# Patient Record
Sex: MALE | Race: BLACK OR AFRICAN AMERICAN | Employment: OTHER | URBAN - METROPOLITAN AREA
[De-identification: names, ages, dates, MRNs, and addresses within clinical notes are randomized per-mention and may not be internally consistent; named-entity substitution may affect disease eponyms.]

---

## 2017-03-14 ENCOUNTER — HOSPITAL ENCOUNTER (OUTPATIENT)
Dept: PREADMISSION TESTING | Age: 62
Discharge: HOME | DRG: 602 | End: 2017-03-14
Payer: MEDICARE

## 2017-03-14 ENCOUNTER — ANESTHESIA EVENT (OUTPATIENT)
Dept: SURGERY | Age: 62
DRG: 602 | End: 2017-03-14
Payer: MEDICARE

## 2017-03-14 VITALS
BODY MASS INDEX: 27.29 KG/M2 | DIASTOLIC BLOOD PRESSURE: 69 MMHG | SYSTOLIC BLOOD PRESSURE: 156 MMHG | TEMPERATURE: 99.1 F | OXYGEN SATURATION: 95 % | RESPIRATION RATE: 16 BRPM | WEIGHT: 205.9 LBS | HEIGHT: 73 IN | HEART RATE: 90 BPM

## 2017-03-14 LAB
ANION GAP BLD CALC-SCNC: 15 MMOL/L (ref 5–15)
ATRIAL RATE: 83 BPM
BASOPHILS # BLD AUTO: 0 K/UL (ref 0–0.1)
BASOPHILS # BLD: 0 % (ref 0–1)
BUN SERPL-MCNC: 58 MG/DL (ref 6–20)
BUN/CREAT SERPL: 7 (ref 12–20)
CALCIUM SERPL-MCNC: 8.2 MG/DL (ref 8.5–10.1)
CALCULATED P AXIS, ECG09: 64 DEGREES
CALCULATED R AXIS, ECG10: 17 DEGREES
CALCULATED T AXIS, ECG11: 56 DEGREES
CHLORIDE SERPL-SCNC: 97 MMOL/L (ref 97–108)
CO2 SERPL-SCNC: 25 MMOL/L (ref 21–32)
CREAT SERPL-MCNC: 8.09 MG/DL (ref 0.7–1.3)
DIAGNOSIS, 93000: NORMAL
EOSINOPHIL # BLD: 0.1 K/UL (ref 0–0.4)
EOSINOPHIL NFR BLD: 1 % (ref 0–7)
ERYTHROCYTE [DISTWIDTH] IN BLOOD BY AUTOMATED COUNT: 14.8 % (ref 11.5–14.5)
GLUCOSE SERPL-MCNC: 400 MG/DL (ref 65–100)
HCT VFR BLD AUTO: 36.4 % (ref 36.6–50.3)
HGB BLD-MCNC: 11.6 G/DL (ref 12.1–17)
LYMPHOCYTES # BLD AUTO: 12 % (ref 12–49)
LYMPHOCYTES # BLD: 1 K/UL (ref 0.8–3.5)
MCH RBC QN AUTO: 32.6 PG (ref 26–34)
MCHC RBC AUTO-ENTMCNC: 31.9 G/DL (ref 30–36.5)
MCV RBC AUTO: 102.2 FL (ref 80–99)
MONOCYTES # BLD: 0.4 K/UL (ref 0–1)
MONOCYTES NFR BLD AUTO: 5 % (ref 5–13)
NEUTS SEG # BLD: 6.8 K/UL (ref 1.8–8)
NEUTS SEG NFR BLD AUTO: 82 % (ref 32–75)
P-R INTERVAL, ECG05: 166 MS
PLATELET # BLD AUTO: 254 K/UL (ref 150–400)
POTASSIUM SERPL-SCNC: 4.7 MMOL/L (ref 3.5–5.1)
Q-T INTERVAL, ECG07: 388 MS
QRS DURATION, ECG06: 104 MS
QTC CALCULATION (BEZET), ECG08: 455 MS
RBC # BLD AUTO: 3.56 M/UL (ref 4.1–5.7)
SODIUM SERPL-SCNC: 137 MMOL/L (ref 136–145)
VENTRICULAR RATE, ECG03: 83 BPM
WBC # BLD AUTO: 8.3 K/UL (ref 4.1–11.1)

## 2017-03-14 RX ORDER — CIPROFLOXACIN 500 MG/1
TABLET ORAL 2 TIMES DAILY
COMMUNITY
End: 2017-03-20

## 2017-03-14 NOTE — PERIOP NOTES
Informed Carlos Bragg from Dr. Suri Torre office that per anesthesia, patient needs to have HD prior to surgery and that his office would have to arrange that. MEÑO attempted to contact patient's HD center but it was closed today. Carlos Bragg will speak with Dr. Gildardo Hilton about this.   DOS: 3/15/2017

## 2017-03-14 NOTE — H&P
PAT Pre-Op History & Physical    Patient: Caitlin Santana                  MRN: [id number]          SSN: [id number]  YOB: 1955          Age: 59 y.o. Sex: male                Subjective:   Patient is a 59 y.o.  male who presents with history of a non healing ulcer left lateral foot. History of left last toe amputation 6/2016. The patient was evaluated in the surgeon's office and it was determined that the most appropriate plan of care is to proceed with surgical intervention. Patient's PCP Zo Kesy MD    Cardiology Dr. [place], last visit 2/2/17, note obtained and placed on chart. Medication list for antihypertensives differs from patient list provided. Patient stated medications changed. Unable to obtain medication list from nephrology at this time. PCP not did not provide medication list.     Patient is a poor historian at times. States he does dialysis M/W/F at 11am and that the facility is closed on Tuesdays and Thursdays. Patient was not sure of name of nephrologist. Dialysis center is [facility] in [place]. Not open today and no information was obtained. Surgeon's office notified to arrange dialysis prior to surgery, per Anesthesia. Exercise tolerance- States he does not climb stairs due to left sided residual weakness from prior stroke. However, denies CP or SOB with activities. Ambulates short distances with cane. States he lives alone and completes all ADL's independently. States he grocery shops and walks store with cart with out difficulty.     Patient Active Problem List    Diagnosis Date Noted    Osteomyelitis, chronic, ankle or foot ([facility] [address] 06/08/2016    HTN (hypertension) 06/08/2016    DM type 2 causing renal disease ([facility] [address] 06/08/2016    ESRD (end stage renal disease) ([facility] [address] 06/08/2016    CAD (coronary artery disease) 06/08/2016    History of CVA (cerebrovascular accident) 06/08/2016    Hyperkalemia 06/08/2016     Past Medical History:   Diagnosis Date    CAD (coronary artery disease)      2014, MI, CABG    Chronic kidney disease     on dialysis MWF    Diabetes [facility] [facility]     ESRD (end stage renal disease) ([facility] [address] [facility]     Glaucoma     Heart failure ([facility] [address]     chronic diastolic HF per cardiology note    Hyperlipidemia     Hypertension     Ill-defined condition     overweight BMI 27.2    PAD (peripheral artery disease) ([facility] [address]     Stroke [facility]) August 2011    residual left side weakness stroke x 2 per cardiology note      Past Surgical History:   Procedure Laterality Date    CARDIAC SURG PROCEDURE UNLIST      cavbg 2014    HX CORONARY ARTERY BYPASS GRAFT  February 2014    HX HEART CATHETERIZATION  01/24/2014    HX OTHER SURGICAL Left 06/2016    last toe on left foot amputated, for nonhealing toe ulcer    HX VASCULAR ACCESS Right Brenden Catheter    removed when fistula healed    VASCULAR SURGERY PROCEDURE UNLIST      fistula left arm      Prior to Admission medications    Medication Sig Start Date End Date Taking? Authorizing Provider   ciprofloxacin HCl (CIPRO) 500 mg tablet Take  by mouth two (2) times a day. Yes Historical Provider   insulin glargine (LANTUS) 100 unit/mL injection 10 Units by SubCUTAneous route daily. Patient taking differently: 20 Units by SubCUTAneous route daily. 6/11/16  Yes Crayton Kussmaul, MD   calcium acetate (PHOSLO) 667 mg cap Take 6 Caps by mouth three (3) times daily (with meals). Yes Historical Provider   FOLIC ACID/VIT BCOMP,C (DIALYVITE PO) Take 1 Tab by mouth daily. Yes Historical Provider   traZODone (DESYREL) 50 mg tablet Take 150 mg by mouth nightly. Yes Historical Provider   latanoprost (XALATAN) 0.005 % ophthalmic solution Administer 1 Drop to both eyes nightly. Yes Historical Provider   levobunolol (BETAGAN) 0.5 % ophthalmic solution Administer 1 Drop to both eyes daily.    Yes Historical Provider   docusate sodium (COLACE) 100 mg capsule Take 100 mg by mouth two (2) times daily as needed. Yes Historical Provider   isosorbide mononitrate ER (IMDUR) 60 mg CR tablet Take 60 mg by mouth daily. Hold during dialysis sessions   Yes Historical Provider   tamsulosin (FLOMAX) 0.4 mg capsule Take 0.4 mg by mouth nightly. Yes Historical Provider   simvastatin (ZOCOR) 40 mg tablet Take 40 mg by mouth nightly. Yes Historical Provider   aspirin delayed-release 81 mg tablet Take 81 mg by mouth daily. Yes Historical Provider     Current Outpatient Prescriptions   Medication Sig    ciprofloxacin HCl (CIPRO) 500 mg tablet Take  by mouth two (2) times a day.  insulin glargine (LANTUS) 100 unit/mL injection 10 Units by SubCUTAneous route daily. (Patient taking differently: 20 Units by SubCUTAneous route daily.)    calcium acetate (PHOSLO) 667 mg cap Take 6 Caps by mouth three (3) times daily (with meals).  FOLIC ACID/VIT BCOMP,C (DIALYVITE PO) Take 1 Tab by mouth daily.  traZODone (DESYREL) 50 mg tablet Take 150 mg by mouth nightly.  latanoprost (XALATAN) 0.005 % ophthalmic solution Administer 1 Drop to both eyes nightly.  levobunolol (BETAGAN) 0.5 % ophthalmic solution Administer 1 Drop to both eyes daily.  docusate sodium (COLACE) 100 mg capsule Take 100 mg by mouth two (2) times daily as needed.  isosorbide mononitrate ER (IMDUR) 60 mg CR tablet Take 60 mg by mouth daily. Hold during dialysis sessions    tamsulosin (FLOMAX) 0.4 mg capsule Take 0.4 mg by mouth nightly.  simvastatin (ZOCOR) 40 mg tablet Take 40 mg by mouth nightly.  aspirin delayed-release 81 mg tablet Take 81 mg by mouth daily. No current facility-administered medications for this encounter.        Allergies   Allergen Reactions    Zoster Vaccine Live Hives      Social History   Substance Use Topics    Smoking status: Never Smoker    Smokeless tobacco: Never Used    Alcohol use No      History   Drug Use No     Family History   Problem Relation Age of Onset    Cancer Mother      \"bone\"    Stroke Father      aneurysm    No Known Problems Sister     No Known Problems Brother         Review of Systems    Patient denies visual disturbances. Multiple missing teeth. Denies mouth sores and loose teeth. Denies  chills and sweats. Denies cough, shortness of breath, and wheezes. Denies chest pain, tightness, pain radiating down left arm, palpitations, dizziness, and lightheadedness. Denies diarrhea, constipation, and abdominal pain. States he makes a small amount of urine, denies any urinary symptoms. Complaint of left sided weakness, denies change in level of weakness. Complaint of ulcer bottom of left lateral foot. Denies excessive thirst, fatigue and malaise. Objective:     Patient Vitals for the past 24 hrs:   Temp Pulse Resp BP SpO2   17 1424 99.1 °F (37.3 °C) 90 16 156/69 95 %     Wt Readings from Last 1 Encounters:   17 93.4 kg (205 lb 14.4 oz)     Body mass index is 27.17 kg/(m^2). Temp (24hrs), Av.1 °F (37.3 °C), Min:99.1 °F (37.3 °C), Max:99.1 °F (37.3 °C)    Physical Exam:     General: Pleasant, cooperative, no apparent distress, appears stated age. Ambulated slowly and steady with cane. Eyes: Conjunctivae/corneas clear. EOMs intact. Nose: Nares normal.   Mouth/Throat: Lips, mucosa, and tongue normal. Missing multiple teeth, all front upper teeth and left lower teeth missing. Neck: Supple, symmetrical, trachea midline. No carotid bruits. Normal ROM in neck. Back: Symmetric. Lungs: Clear to auscultation bilaterally. Heart: Regular rate and rhythm, S1, S2 normal. No murmur, click, rub or gallop. Abdomen: Soft, non-tender. No distension. Bowel sounds normal.       Musculoskeletal:  Left sided weakness noted in  and decreased strength LLE, decreased dorsal flexion. Extremities:   Missing last toe on left foot. Calves supple, non tender to palpation. Left upper arm AV fistula, +thrill. Skin:  No rashes.  Left lateral foot ulcer underside of area of amputated left last toe. Area is dime sized covered in yellow thick drainage. Skin below ulcer approximately 3 to 4 inches- along left lateral foot- reddened, swollen and warm to the touch. Lymph nodes: No adenopathy. Neurologic: Alert and oriented to person, place and time. CN II-XII grossly intact. Labs:   Recent Results (from the past 72 hour(s))   METABOLIC PANEL, BASIC    Collection Time: 03/14/17  3:37 PM   Result Value Ref Range    Sodium 137 136 - 145 mmol/L    Potassium 4.7 3.5 - 5.1 mmol/L    Chloride 97 97 - 108 mmol/L    CO2 25 21 - 32 mmol/L    Anion gap 15 5 - 15 mmol/L    Glucose 400 (H) 65 - 100 mg/dL    BUN 58 (H) 6 - 20 MG/DL    Creatinine 8.09 (H) 0.70 - 1.30 MG/DL    BUN/Creatinine ratio 7 (L) 12 - 20      GFR est AA 8 (L) >60 ml/min/1.73m2    GFR est non-AA 7 (L) >60 ml/min/1.73m2    Calcium 8.2 (L) 8.5 - 10.1 MG/DL   CBC WITH AUTOMATED DIFF    Collection Time: 03/14/17  3:37 PM   Result Value Ref Range    WBC 8.3 4.1 - 11.1 K/uL    RBC 3.56 (L) 4.10 - 5.70 M/uL    HGB 11.6 (L) 12.1 - 17.0 g/dL    HCT 36.4 (L) 36.6 - 50.3 %    .2 (H) 80.0 - 99.0 FL    MCH 32.6 26.0 - 34.0 PG    MCHC 31.9 30.0 - 36.5 g/dL    RDW 14.8 (H) 11.5 - 14.5 %    PLATELET 345 764 - 656 K/uL    NEUTROPHILS 82 (H) 32 - 75 %    LYMPHOCYTES 12 12 - 49 %    MONOCYTES 5 5 - 13 %    EOSINOPHILS 1 0 - 7 %    BASOPHILS 0 0 - 1 %    ABS. NEUTROPHILS 6.8 1.8 - 8.0 K/UL    ABS. LYMPHOCYTES 1.0 0.8 - 3.5 K/UL    ABS. MONOCYTES 0.4 0.0 - 1.0 K/UL    ABS. EOSINOPHILS 0.1 0.0 - 0.4 K/UL    ABS.  BASOPHILS 0.0 0.0 - 0.1 K/UL   EKG, 12 LEAD, INITIAL    Collection Time: 03/14/17  4:09 PM   Result Value Ref Range    Ventricular Rate 83 BPM    Atrial Rate 83 BPM    P-R Interval 166 ms    QRS Duration 104 ms    Q-T Interval 388 ms    QTC Calculation (Bezet) 455 ms    Calculated P Axis 64 degrees    Calculated R Axis 17 degrees    Calculated T Axis 56 degrees    Diagnosis       Normal sinus rhythm  Normal ECG  No previous ECGs available  Confirmed by Allie Clemente MD., Gloria ([id number]) on 3/14/2017 4:58:58 PM         Assessment:     ABSCESS LEFT FOOT    Plan:     Scheduled for INCISION AND DRAINAGE LEFT FOOT  (ANKLE BLK)    BMP, CBC and EKG done per anesthesia protocol. EKG reviewed- unremarkable. CBC shows anemia, consistent with CKD- ESRD. BMP shows blood sugar 400. Abnormal labs faxed to surgeon. Renal labs consistent with ESRD. DOS repeat POC glucose. Patient is hemodialysis M-W-F  At [facility] in [place]. They are not open on Tuesdays and Thursdays. Dr. Franco office notified, Anesthesia- discussed with Marko Mcallister MD, requires patient have dialysis prior to surgery. Diaylsis planned 3/17/17 at 950am for 300pm surgery on 3/17/17 per Dr. Franco office.     Brad Miranda NP

## 2017-03-14 NOTE — PERIOP NOTES
Patient with history of CKD on HD M-W-F. He does not know the name of his nephrologist.  Vito De from Dr. Sofia Dean office to inform him of this and to see if he has the nephrologist's name. He is aware that the patient is an HD patient. Per Evita Salomon, he  informed the patient to make arrangements for his HD with his nephrologist when he was at his appointment with Dr. Brianna Persaud this morning. Patient has not spoken to his nephrologist.  Also informed Evita Salomon that there is redness around the ulcer on patient's left foot. See JOHN Song's notes for details.   DOS: 3/15/2017

## 2017-03-14 NOTE — PERIOP NOTES
[facility]  PREOPERATIVE INSTRUCTIONS    Surgery Date: 3/15/2017  Surgery arrival time given by surgeon: NO   If no,[facility] [address] staff will call you between 4 PM- 8 PM the day before surgery with your arrival time. If your surgery is on a Monday, we will call you the preceding Friday. Please call [phone] after 8 PM if you did not receive your arrival time. 1. Please report at the designated time to the [address] [facility]. Bring your insurance card, photo identification, and any copayment ( if applicable). 2. You must have a responsible adult to drive you home. You need to have a responsible adult to stay with you the first 24 hours after surgery if you are going home the same day of your surgery and you should not drive a car for 24 hours following your surgery. 3. Nothing to eat or drink after midnight the night before surgery. This includes no water, gum, mints, coffee, juice, etc.  Please note special instructions, if applicable, below for medications. 4. MEDICATIONS TO TAKE THE MORNING OF SURGERY WITH A SIP OF WATER: Isosorbide, Ciprofloxacin  5. You MAY take your pain medication the day of surgery with a sip of water. 6. No alcoholic beverages 24 hours before or after your surgery. 7. If you are being admitted to the hospital,please leave personal belongings/luggage in your car until you have an assigned hospital room number. 8. Stop Aspirin and/or any non-steroidal anti-inflammatory drugs (i.e. Ibuprofen, Naproxen, Advil, Aleve) as directed by your prescribing physician. You may take Tylenol. Stop herbal supplements 1 week prior to  surgery. 9. If you are currently taking Plavix, Coumadin,or any other blood-thinning/anticoagulant medication contact your prescribing physician for instructions. 10. Please wear comfortable clothes. Wear your glasses instead of contacts. We ask that all money, jewelry and valuables be left at home. Wear no make up, particularly mascara, the day of surgery. 11.  All body piercings, rings,and jewelry need to be removed and left at home. Please wear your hair loose or down. Please no pony-tails, buns, or any metal hair accessories. If you shower the morning of surgery, please do not apply any lotions, powders, or deodorants afterwards. Do not shave any body area within 24 hours of your surgery. 12. Please follow all instructions to avoid any potential surgical cancellation. 13. Should your physical condition change, (i.e. fever, cold, flu, etc.) please notify your surgeon as soon as possible. 14. It is important to be on time. If a situation occurs where you may be delayed, please call:  [phone]  on the day of surgery. 15. The [facility] staff can be reached at [phone]. Candance Huger 16. Special instructions:   1. Please bring your completed medication sheet with you the day of surgery. Please update any changes in medications. 2. Free  parking  The patient was contacted  in person. She  verbalize  understanding of all instructions does not  need reinforcement.

## 2017-03-14 NOTE — PERIOP NOTES
Spoke with Sarah Bal from Dr. Caitlin Alejandre office, PCP, requesting the last OV note.   DOS: 3/15/2017

## 2017-03-15 ENCOUNTER — ANESTHESIA (OUTPATIENT)
Dept: SURGERY | Age: 62
DRG: 602 | End: 2017-03-15
Payer: MEDICARE

## 2017-03-15 NOTE — PERIOP NOTES
Spoke with Katiuska Martinez at Dr. Ambar Manriquez office, PCP, to confirm their fax number and notify them of labs being faxed to them for Dr. Ambar Manriquez review. Faxed BMP and CBC results to [phone].   DOS: 3/17/2017

## 2017-03-15 NOTE — PERIOP NOTES
[facility] [employer]. Patient's nephrologist is Dr. Allie De La Garza. [phone]. Spoke to Dev huitron at Dr. Lewis Hard office requesting the last OV note and any recent labs.   DOS: 3/17/2017

## 2017-03-16 NOTE — PERIOP NOTES
Spoke with Fabien Sparks from Dr. Jose Lassiter' office requesting new orders with new DOS, 3/17/2017.

## 2017-03-16 NOTE — PERIOP NOTES
New orders state not to give a preop antibiotic. Spoke to [facility] from Dr. Sybil Nelson office. Per [facility], Dr. Citlalli Echols does NOT want to give a preop antibiotic.   DOS: 3/17/2017

## 2017-03-17 ENCOUNTER — HOSPITAL ENCOUNTER (INPATIENT)
Age: 62
LOS: 3 days | Discharge: HOME HEALTH | DRG: 602 | End: 2017-03-20
Attending: ORTHOPAEDIC SURGERY | Admitting: ORTHOPAEDIC SURGERY
Payer: MEDICARE

## 2017-03-17 PROBLEM — L02.612 ABSCESS OF LEFT FOOT: Status: ACTIVE | Noted: 2017-03-17

## 2017-03-17 PROBLEM — B99.9 INFECTION: Status: ACTIVE | Noted: 2017-03-17

## 2017-03-17 LAB
ANION GAP BLD CALC-SCNC: 16 MMOL/L (ref 5–15)
BUN BLD-MCNC: 37 MG/DL (ref 9–20)
CA-I BLD-MCNC: 0.97 MMOL/L (ref 1.12–1.32)
CHLORIDE BLD-SCNC: 92 MMOL/L (ref 98–107)
CO2 BLD-SCNC: 33 MMOL/L (ref 21–32)
CREAT BLD-MCNC: 4.7 MG/DL (ref 0.6–1.3)
GLUCOSE BLD STRIP.AUTO-MCNC: 152 MG/DL (ref 65–100)
GLUCOSE BLD STRIP.AUTO-MCNC: 248 MG/DL (ref 65–100)
GLUCOSE BLD STRIP.AUTO-MCNC: 338 MG/DL (ref 65–100)
GLUCOSE BLD-MCNC: 209 MG/DL (ref 75–110)
HCT VFR BLD CALC: 43 % (ref 36.6–50.3)
HGB BLD-MCNC: 14.6 GM/DL (ref 12.1–17)
POTASSIUM BLD-SCNC: 4.1 MMOL/L (ref 3.5–5.1)
SERVICE CMNT-IMP: ABNORMAL
SODIUM BLD-SCNC: 136 MMOL/L (ref 136–145)

## 2017-03-17 PROCEDURE — 77030021122 HC SPLNT MAT FST BSNM -A: Performed by: ORTHOPAEDIC SURGERY

## 2017-03-17 PROCEDURE — 65270000029 HC RM PRIVATE

## 2017-03-17 PROCEDURE — 74011000250 HC RX REV CODE- 250: Performed by: NURSE PRACTITIONER

## 2017-03-17 PROCEDURE — 74011250636 HC RX REV CODE- 250/636: Performed by: INTERNAL MEDICINE

## 2017-03-17 PROCEDURE — 74011250636 HC RX REV CODE- 250/636

## 2017-03-17 PROCEDURE — 77030020782 HC GWN BAIR PAWS FLX 3M -B

## 2017-03-17 PROCEDURE — 82962 GLUCOSE BLOOD TEST: CPT

## 2017-03-17 PROCEDURE — 0Y9N0ZX DRAINAGE OF LEFT FOOT, OPEN APPROACH, DIAGNOSTIC: ICD-10-PCS | Performed by: ORTHOPAEDIC SURGERY

## 2017-03-17 PROCEDURE — 77030018836 HC SOL IRR NACL ICUM -A: Performed by: ORTHOPAEDIC SURGERY

## 2017-03-17 PROCEDURE — 87205 SMEAR GRAM STAIN: CPT | Performed by: ORTHOPAEDIC SURGERY

## 2017-03-17 PROCEDURE — 74011000272 HC RX REV CODE- 272: Performed by: ORTHOPAEDIC SURGERY

## 2017-03-17 PROCEDURE — 76010000138 HC OR TIME 0.5 TO 1 HR: Performed by: ORTHOPAEDIC SURGERY

## 2017-03-17 PROCEDURE — 76210000016 HC OR PH I REC 1 TO 1.5 HR: Performed by: ORTHOPAEDIC SURGERY

## 2017-03-17 PROCEDURE — 80047 BASIC METABLC PNL IONIZED CA: CPT

## 2017-03-17 PROCEDURE — 87075 CULTR BACTERIA EXCEPT BLOOD: CPT | Performed by: ORTHOPAEDIC SURGERY

## 2017-03-17 PROCEDURE — 76060000032 HC ANESTHESIA 0.5 TO 1 HR: Performed by: ORTHOPAEDIC SURGERY

## 2017-03-17 PROCEDURE — 74011250637 HC RX REV CODE- 250/637: Performed by: NURSE PRACTITIONER

## 2017-03-17 PROCEDURE — 74011000250 HC RX REV CODE- 250: Performed by: ORTHOPAEDIC SURGERY

## 2017-03-17 PROCEDURE — 3E0T3CZ INTRODUCE REGIONAL ANESTH IN PERIPH NRV, PLEXI, PERC: ICD-10-PCS | Performed by: ANESTHESIOLOGY

## 2017-03-17 PROCEDURE — 77030012406 HC DRN WND PENRS BARD -A: Performed by: ORTHOPAEDIC SURGERY

## 2017-03-17 PROCEDURE — 74011000258 HC RX REV CODE- 258: Performed by: INTERNAL MEDICINE

## 2017-03-17 RX ORDER — FENTANYL CITRATE 50 UG/ML
INJECTION, SOLUTION INTRAMUSCULAR; INTRAVENOUS AS NEEDED
Status: DISCONTINUED | OUTPATIENT
Start: 2017-03-17 | End: 2017-03-17 | Stop reason: HOSPADM

## 2017-03-17 RX ORDER — DEXTROSE 50 % IN WATER (D50W) INTRAVENOUS SYRINGE
12.5-25 AS NEEDED
Status: DISCONTINUED | OUTPATIENT
Start: 2017-03-17 | End: 2017-03-19

## 2017-03-17 RX ORDER — SODIUM CHLORIDE 0.9 % (FLUSH) 0.9 %
5-10 SYRINGE (ML) INJECTION EVERY 8 HOURS
Status: DISCONTINUED | OUTPATIENT
Start: 2017-03-17 | End: 2017-03-17 | Stop reason: HOSPADM

## 2017-03-17 RX ORDER — SODIUM CHLORIDE 0.9 % (FLUSH) 0.9 %
5 SYRINGE (ML) INJECTION EVERY 8 HOURS
Status: DISCONTINUED | OUTPATIENT
Start: 2017-03-17 | End: 2017-03-17 | Stop reason: HOSPADM

## 2017-03-17 RX ORDER — OXYCODONE AND ACETAMINOPHEN 5; 325 MG/1; MG/1
1 TABLET ORAL
Status: DISCONTINUED | OUTPATIENT
Start: 2017-03-17 | End: 2017-03-20 | Stop reason: HOSPADM

## 2017-03-17 RX ORDER — SODIUM CHLORIDE, SODIUM LACTATE, POTASSIUM CHLORIDE, CALCIUM CHLORIDE 600; 310; 30; 20 MG/100ML; MG/100ML; MG/100ML; MG/100ML
100 INJECTION, SOLUTION INTRAVENOUS CONTINUOUS
Status: DISCONTINUED | OUTPATIENT
Start: 2017-03-17 | End: 2017-03-17 | Stop reason: HOSPADM

## 2017-03-17 RX ORDER — ASPIRIN 81 MG/1
81 TABLET ORAL DAILY
Status: DISCONTINUED | OUTPATIENT
Start: 2017-03-18 | End: 2017-03-20 | Stop reason: HOSPADM

## 2017-03-17 RX ORDER — SODIUM CHLORIDE 0.9 % (FLUSH) 0.9 %
5-10 SYRINGE (ML) INJECTION AS NEEDED
Status: DISCONTINUED | OUTPATIENT
Start: 2017-03-17 | End: 2017-03-17 | Stop reason: HOSPADM

## 2017-03-17 RX ORDER — INSULIN GLARGINE 100 [IU]/ML
10 INJECTION, SOLUTION SUBCUTANEOUS
Status: DISCONTINUED | OUTPATIENT
Start: 2017-03-17 | End: 2017-03-20

## 2017-03-17 RX ORDER — HYDROMORPHONE HYDROCHLORIDE 1 MG/ML
0.5 INJECTION, SOLUTION INTRAMUSCULAR; INTRAVENOUS; SUBCUTANEOUS
Status: DISCONTINUED | OUTPATIENT
Start: 2017-03-17 | End: 2017-03-20 | Stop reason: HOSPADM

## 2017-03-17 RX ORDER — HYDROMORPHONE HYDROCHLORIDE 1 MG/ML
.25-1 INJECTION, SOLUTION INTRAMUSCULAR; INTRAVENOUS; SUBCUTANEOUS
Status: DISCONTINUED | OUTPATIENT
Start: 2017-03-17 | End: 2017-03-17 | Stop reason: HOSPADM

## 2017-03-17 RX ORDER — DOCUSATE SODIUM 100 MG/1
100 CAPSULE, LIQUID FILLED ORAL 2 TIMES DAILY
Status: DISCONTINUED | OUTPATIENT
Start: 2017-03-17 | End: 2017-03-20 | Stop reason: HOSPADM

## 2017-03-17 RX ORDER — SODIUM CHLORIDE, SODIUM LACTATE, POTASSIUM CHLORIDE, CALCIUM CHLORIDE 600; 310; 30; 20 MG/100ML; MG/100ML; MG/100ML; MG/100ML
INJECTION, SOLUTION INTRAVENOUS
Status: DISCONTINUED | OUTPATIENT
Start: 2017-03-17 | End: 2017-03-17 | Stop reason: HOSPADM

## 2017-03-17 RX ORDER — LEVOBUNOLOL HYDROCHLORIDE 5 MG/ML
1 SOLUTION/ DROPS OPHTHALMIC DAILY
Status: DISCONTINUED | OUTPATIENT
Start: 2017-03-18 | End: 2017-03-20 | Stop reason: HOSPADM

## 2017-03-17 RX ORDER — INSULIN GLARGINE 100 [IU]/ML
20 INJECTION, SOLUTION SUBCUTANEOUS DAILY
COMMUNITY
End: 2020-07-17

## 2017-03-17 RX ORDER — INSULIN GLARGINE 100 [IU]/ML
10 INJECTION, SOLUTION SUBCUTANEOUS DAILY
Status: DISCONTINUED | OUTPATIENT
Start: 2017-03-18 | End: 2017-03-17 | Stop reason: SDUPTHER

## 2017-03-17 RX ORDER — MAGNESIUM SULFATE 100 %
4 CRYSTALS MISCELLANEOUS AS NEEDED
Status: DISCONTINUED | OUTPATIENT
Start: 2017-03-17 | End: 2017-03-20 | Stop reason: HOSPADM

## 2017-03-17 RX ORDER — SIMVASTATIN 20 MG/1
40 TABLET, FILM COATED ORAL
Status: DISCONTINUED | OUTPATIENT
Start: 2017-03-17 | End: 2017-03-20 | Stop reason: HOSPADM

## 2017-03-17 RX ORDER — PROPOFOL 10 MG/ML
INJECTION, EMULSION INTRAVENOUS
Status: DISCONTINUED | OUTPATIENT
Start: 2017-03-17 | End: 2017-03-17 | Stop reason: HOSPADM

## 2017-03-17 RX ORDER — LIDOCAINE HYDROCHLORIDE 10 MG/ML
0.1 INJECTION, SOLUTION EPIDURAL; INFILTRATION; INTRACAUDAL; PERINEURAL AS NEEDED
Status: DISCONTINUED | OUTPATIENT
Start: 2017-03-17 | End: 2017-03-17 | Stop reason: HOSPADM

## 2017-03-17 RX ORDER — ISOSORBIDE MONONITRATE 30 MG/1
60 TABLET, EXTENDED RELEASE ORAL DAILY
Status: DISCONTINUED | OUTPATIENT
Start: 2017-03-18 | End: 2017-03-20 | Stop reason: HOSPADM

## 2017-03-17 RX ORDER — INSULIN LISPRO 100 [IU]/ML
INJECTION, SOLUTION INTRAVENOUS; SUBCUTANEOUS
Status: DISCONTINUED | OUTPATIENT
Start: 2017-03-17 | End: 2017-03-19

## 2017-03-17 RX ORDER — LATANOPROST 50 UG/ML
1 SOLUTION/ DROPS OPHTHALMIC
Status: DISCONTINUED | OUTPATIENT
Start: 2017-03-17 | End: 2017-03-20 | Stop reason: HOSPADM

## 2017-03-17 RX ORDER — VANCOMYCIN/0.9 % SOD CHLORIDE 1.5G/250ML
1500 PLASTIC BAG, INJECTION (ML) INTRAVENOUS ONCE
Status: COMPLETED | OUTPATIENT
Start: 2017-03-17 | End: 2017-03-18

## 2017-03-17 RX ORDER — MIDAZOLAM HYDROCHLORIDE 1 MG/ML
INJECTION, SOLUTION INTRAMUSCULAR; INTRAVENOUS AS NEEDED
Status: DISCONTINUED | OUTPATIENT
Start: 2017-03-17 | End: 2017-03-17 | Stop reason: HOSPADM

## 2017-03-17 RX ORDER — TAMSULOSIN HYDROCHLORIDE 0.4 MG/1
0.4 CAPSULE ORAL
Status: DISCONTINUED | OUTPATIENT
Start: 2017-03-17 | End: 2017-03-20 | Stop reason: HOSPADM

## 2017-03-17 RX ADMIN — INSULIN GLARGINE 10 UNITS: 100 INJECTION, SOLUTION SUBCUTANEOUS at 21:42

## 2017-03-17 RX ADMIN — FENTANYL CITRATE 100 MCG: 50 INJECTION, SOLUTION INTRAMUSCULAR; INTRAVENOUS at 14:31

## 2017-03-17 RX ADMIN — MIDAZOLAM HYDROCHLORIDE 3 MG: 1 INJECTION, SOLUTION INTRAMUSCULAR; INTRAVENOUS at 14:31

## 2017-03-17 RX ADMIN — SODIUM CHLORIDE, SODIUM LACTATE, POTASSIUM CHLORIDE, CALCIUM CHLORIDE: 600; 310; 30; 20 INJECTION, SOLUTION INTRAVENOUS at 14:47

## 2017-03-17 RX ADMIN — VANCOMYCIN HYDROCHLORIDE 1500 MG: 10 INJECTION, POWDER, LYOPHILIZED, FOR SOLUTION INTRAVENOUS at 21:43

## 2017-03-17 RX ADMIN — LATANOPROST 1 DROP: 50 SOLUTION OPHTHALMIC at 21:43

## 2017-03-17 RX ADMIN — DOCUSATE SODIUM 100 MG: 100 CAPSULE ORAL at 19:16

## 2017-03-17 RX ADMIN — SIMVASTATIN 40 MG: 20 TABLET, FILM COATED ORAL at 21:42

## 2017-03-17 RX ADMIN — INSULIN LISPRO 4 UNITS: 100 INJECTION, SOLUTION INTRAVENOUS; SUBCUTANEOUS at 21:41

## 2017-03-17 RX ADMIN — PROPOFOL 50 MCG/KG/MIN: 10 INJECTION, EMULSION INTRAVENOUS at 14:55

## 2017-03-17 RX ADMIN — TRAZODONE HYDROCHLORIDE 150 MG: 100 TABLET ORAL at 21:42

## 2017-03-17 RX ADMIN — PIPERACILLIN SODIUM,TAZOBACTAM SODIUM 3.38 G: 3; .375 INJECTION, POWDER, FOR SOLUTION INTRAVENOUS at 19:16

## 2017-03-17 RX ADMIN — TAMSULOSIN HYDROCHLORIDE 0.4 MG: 0.4 CAPSULE ORAL at 21:42

## 2017-03-17 NOTE — PROGRESS NOTES
[facility] Dosing Services: Antimicrobial Stewardship Progress Note  Consult for antibiotic dosing of Vancomycin by Dr. Jennifer Smalls dose change per P&T protocol  Pharmacist reviewed antibiotic appropriateness for 59year old male for indication of Left foot abscess  Day of Therapy: 1  (Pt has a hx of ESRD on HD) - no renal consult yet    Plan:  Vancomycin therapy:  Loading dose: Vancomycin 1500 mg IV x1 dose  Maintenance dose: Dosing per levels due to CKD  Dose calculated to approximate a therapeutic trough of 10-15 mcg/mL. Last trough level / Plan for level: in 36-48hrs unless HD ordered  Pharmacy to follow daily and will make changes to dose and/or frequency based on clinical status. Non-Kinetic Antimicrobial Dosing:   Current Regimen:  Zosyn 3.375 gm IV q8hr  Recommendation: Changed to Zosyn 3.375 gm IV q12hr    Other Antimicrobial  (not dosed by pharmacist)   none   Cultures     3/17/2017: intraop cultures pending   Serum Creatinine     Lab Results   Component Value Date/Time    Creatinine 8.09 03/14/2017 03:37 PM    Creatinine (POC) 4.7 03/17/2017 01:12 PM       Creatinine Clearance Estimated Creatinine Clearance: 10.6 mL/min (based on Cr of 8.09).      Temp   98.6 °F (37 °C)    WBC   Lab Results   Component Value Date/Time    WBC 8.3 03/14/2017 03:37 PM       H/H   Lab Results   Component Value Date/Time    HGB 11.6 03/14/2017 03:37 PM        Platelets   Lab Results   Component Value Date/Time    PLATELET 486 22/31/3759 03:37 PM        Pharmacist: Signed Ramirez Hammond Contact information: [phone]

## 2017-03-17 NOTE — BRIEF OP NOTE
BRIEF OPERATIVE NOTE    Date of Procedure: 3/17/2017   Preoperative Diagnosis: ABSCESS LEFT FOOT  Postoperative Diagnosis: * No post-op diagnosis entered *    Procedure(s):  INCISION AND DRAINAGE LEFT FOOT  (ANKLE BLK)  Surgeon(s) and Role:     * Tania Carreno MD - Primary            Surgical Staff:  Circ-1: Aleshia Hatfield RN  Scrub RN-1: Beka Delgado RN  Surg Asst-1: Joycelyn Louis LPN  Event Time In   Incision Start 1504   Incision Close 1517     Anesthesia: Other   Estimated Blood Loss: 0  Specimens:   ID Type Source Tests Collected by Time Destination   1 : abscess left foot Wound Abscess CULTURE, ANAEROBIC, CULTURE, WOUND W GRAM STAIN Tania Carreno MD 3/17/2017 1508 Microbiology      Findings: abcess   Complications: 0  Implants: * No implants in log *

## 2017-03-17 NOTE — CONSULTS
IM Consult History and Physical:    NAME:    Samra Connolly   :     1955   MRN:  [id number]     PCP:  Moy Fu MD     Referring Physician: Jorge Alex MD     Date:   3/17/2017      Reason for Consultaion: Post operative medical management    Chief Complaint: Left foot abcess    History of presenting illness:     Mr. Leeroy Espinoza is a 59 y.o. male who is admitted to the orthopedic service with an abscess left foot and is sp I&D. Mr. Leeroy Espinoza is being seen in medical consultation. He currently says he has a left foot aching discomfort but denies any chest discomfort, SOB or fever. Discomfort better with pain medications. He has no nausea or vomiting. Currently on a diabetic renal diet. Allergies   Allergen Reactions    Zoster Vaccine Live Hives        Prior to Admission medications    Medication Sig Start Date End Date Taking? Authorizing Provider   ciprofloxacin HCl (CIPRO) 500 mg tablet Take  by mouth two (2) times a day. Yes Historical Provider   insulin glargine (LANTUS) 100 unit/mL injection 10 Units by SubCUTAneous route daily. Patient taking differently: 20 Units by SubCUTAneous route daily. 16  Yes Armida Escalera MD   calcium acetate (PHOSLO) 667 mg cap Take 6 Caps by mouth three (3) times daily (with meals). Yes Historical Provider   FOLIC ACID/VIT BCOMP,C (DIALYVITE PO) Take 1 Tab by mouth daily. Yes Historical Provider   traZODone (DESYREL) 50 mg tablet Take 150 mg by mouth nightly. Yes Historical Provider   latanoprost (XALATAN) 0.005 % ophthalmic solution Administer 1 Drop to both eyes nightly. Yes Historical Provider   levobunolol (BETAGAN) 0.5 % ophthalmic solution Administer 1 Drop to both eyes daily. Yes Historical Provider   isosorbide mononitrate ER (IMDUR) 60 mg CR tablet Take 60 mg by mouth daily. Hold during dialysis sessions   Yes Historical Provider   tamsulosin (FLOMAX) 0.4 mg capsule Take 0.4 mg by mouth nightly.    Yes Historical Provider   simvastatin (ZOCOR) 40 mg tablet Take 40 mg by mouth nightly. Yes Historical Provider   aspirin delayed-release 81 mg tablet Take 81 mg by mouth daily. Yes Historical Provider   docusate sodium (COLACE) 100 mg capsule Take 100 mg by mouth two (2) times daily as needed. Historical Provider       Past Medical History:   Diagnosis Date    CAD (coronary artery disease)      2014, MI, CABG    Chronic kidney disease     on dialysis MWF    [facility]     ESRD (end stage renal disease) [facility]     Glaucoma     Heart failure ([facility]     chronic diastolic HF per cardiology note    Hyperlipidemia     Hypertension     Ill-defined condition     overweight BMI 27.2    [facility]     Stroke [facility]) August 2011    residual left side weakness stroke x 2 per cardiology note        Past Surgical History:   Procedure Laterality Date    CARDIAC SURG PROCEDURE UNLIST      cavbg 2014    HX CORONARY ARTERY BYPASS GRAFT  February 2014    HX HEART CATHETERIZATION  01/24/2014    HX OTHER SURGICAL Left 06/2016    last toe on left foot amputated, for nonhealing toe ulcer    HX VASCULAR ACCESS Right Brenden Catheter    removed when fistula healed    VASCULAR SURGERY PROCEDURE UNLIST      fistula left arm       Social History   Substance Use Topics    Smoking status: Never Smoker    Smokeless tobacco: Never Used    Alcohol use No        Family History   Problem Relation Age of Onset    Cancer Mother      \"bone\"   [de-identified] Stroke Father      aneurysm    No Known Problems Sister     No Known Problems Brother         Review of Systems:    Constitutional ROS: no fever, chills, rigors or night sweats  Respiratory ROS: no cough, sputum, hemoptysis, dyspnea or pleuritic pain.   Cardiovascular ROS: no chest pain, palpitation, orthopnea, PND or syncope  Endocrine ROS: no polydispsia, polyuria, heat or cold intolerance   Gastrointestinal ROS: no dysphagia, abdominal pain, nausea, vomiting, diarrhea or bleeding. Genito-Urinary ROS: no dysuria, frequency, hematuria, retention, or flank pain  Musculoskeletal ROS: no swelling or muscular tenderness  Neurological ROS: no headache, confusion, diplopia, focal symptoms, peripheral paresthesia, tremor, or hallucinosis. Psychiatric ROS: ROS: no depression, anxiety, mood swings  Dermatological ROS: no rash, pruritis, or urticaria            On Examination:      Visit Vitals    /67 (BP 1 Location: Right arm, BP Patient Position: At rest)    Pulse 77    Temp 98.6 °F (37 °C)    Resp 17    Ht 6' 1\" (1.854 m)    Wt 91.6 kg (202 lb)    SpO2 99%    BMI 26.65 kg/m2     SpO2 Readings from Last 6 Encounters:   03/17/17 99%   03/14/17 95%   06/12/16 94%   06/16/15 97%   05/13/14 100%            O2 Flow Rate (L/min): 2 l/min    Physical Examination:    Gen:  Well but ill looking patient, not in much acute distress  Eyes: pink conjunctivae, PERRLA with no discharge. ENT:  no ottorrhea or rhinorrhea, moist mucous membranes  Neck:  Supple, no masses, thyroid non-tender with a central trachea. Pulm: clear breath sounds without crackles or wheezes  Card:  no JVD or murmurs, has regular and normal S1, S2 without thrills, bruits or pedal edema  Abd:  Soft, non-tender, non-distended, normoactive bowel sounds   Musc:  No cyanosis or clubbing. No atrophy or deformities. Dressed left foot  Skin:  No rashes, bruising or ulcers, skin turgor is good  Neuro: Awake and alert, CNs 2-12 intact with a non focal exam. Follows commands appropriately  Psych: Oriented x 3, no hallucinations or delusions. Labs:    No results for input(s): WBC, HGB, HCT, PLT, HGBEXT, HCTEXT, PLTEXT in the last 72 hours. No results for input(s): NA, K, CL, CO2, GLU, BUN, CREA, CA, MG, PHOS, ALB, TBIL, SGOT, ALT in the last 72 hours. No components found for: GLPOC    No results for input(s): INR in the last 72 hours.     No lab exists for component: INREXT    Assessment/Recommendations:     Mr. Rogelio Llanos is a 59 y.o. male being reviewed for:     Abscess of left foot (3/17/2017): he is sp I&D. Continue empiric IV Zosyn and Vancomycin. ID consult pending. Wound care. Further post op as per ortho    HTN (hypertension) (6/8/2016): BP well controlled. Not on any scheduled medications. Monitor    DM type 2 causing renal disease ([facility] (6/8/2016): now eating. Monitor blood glucose. Resume Lantus insulin and start a SS per protocol    ESRD on dialysis [facility]) (6/8/2016): gets HD on MWFs. Had a session today. Consult renal as he is likely to be here monday    CAD (coronary artery disease) (6/8/2016): sp CABG. Asymptomatic. Resume Asprin, Imdur, Simvastatin    History of CVA (cerebrovascular accident) (6/8/2016): stable. Resume Asprin, Simvastatin     Thank you for the privilege to participate in Mr. Margoth Newell care. We will follow along with you.     Total time spent for care of the patient: [address] discussed with: Patient, Family and Nursing Staff    Discussed:  Care Plan     ___________________________________________________    Attending Physician: López Parham MD

## 2017-03-17 NOTE — OP NOTES
[facility]   [address] [facility], [address]   OP NOTE       Name:  Tori Espino   MR#:  [id number]   :  1955   Account #:  [de-identified]    Surgery Date:  2016   Date of Adm:  2017       PREOPERATIVE DIAGNOSIS: Abscess, left foot, cultured klebsiella. POSTOPERATIVE DIAGNOSIS: Abscess, left foot, cultured klebsiella. PROCEDURES PERFORMED: Incision and drainage, abscess, left   foot. SURGEON: Tutu Diana MD      ESTIMATED BLOOD LOSS: Nil. SPECIMENS REMOVED: Aerobic and anaerobic cultures were taken   at the time of the surgery. ANESTHESIA: Ankle block. COMPLICATIONS: None encountered. TOURNIQUET TIME: Approximately 12 minutes. DESCRIPTION OF PROCEDURE: After consents were obtained and   preoperative sedation, the patient was taken to the operating suites   and underwent an ankle block without difficulty. A pneumatic tourniquet   was placed around the left ankle and the left foot was scrubbed and   draped in the usual sterile fashion. After Esmarch exsanguination, the   tourniquet was inflated to 250 mmHg. An elliptical excision of skin and   sinus tract was excised over the distal lateral aspect of the prior trans   fifth metatarsal amputation surgical site. Incision was carried through   skin and subcutaneous tissue, down through the superficial to the deep   fascia where the skin was excised. A small abscess pocket superficial   and dorsal was entered and pus was evacuated. Cultures, aerobic and   anaerobic, were carried out. Copious irrigation of the wound was   carried out. A Penrose drain was placed into the wound and 3-0 nylon   suture was used in an interrupted fashion for loose closure of the   wound. A sterile compressive postoperative dressing was applied, the   tourniquet was deflated and the patient was awakened from   anesthesia and taken to recovery room in satisfactory condition.         Sandi Severino MD Kimber Quesada / [place] [facility]   D:  03/17/2017   15:24   T:  03/17/2017   15:58   Job #:  [id number]

## 2017-03-17 NOTE — ROUTINE PROCESS
TRANSFER - OUT REPORT:    Verbal report given to Ricardo Calero RN(name) on Scout Romero  being transferred to [facility] for routine post - op       Report consisted of patients Situation, Background, Assessment and   Recommendations(SBAR). Information from the following report(s) SBAR and OR Summary was reviewed with the receiving nurse. Lines:   Peripheral IV 03/17/17 Right Hand (Active)   Site Assessment Clean, dry, & intact 3/17/2017  3:30 PM   Phlebitis Assessment 0 3/17/2017  3:30 PM   Infiltration Assessment 0 3/17/2017  3:30 PM   Dressing Status Clean, dry, & intact 3/17/2017  3:30 PM   Dressing Type Tape;Transparent 3/17/2017  3:30 PM   Hub Color/Line Status Pink 3/17/2017  3:30 PM   Alcohol Cap Used Yes 3/17/2017  1:22 PM        Opportunity for questions and clarification was provided.       Patient transported with:   Registered Nurse

## 2017-03-17 NOTE — PROGRESS NOTES
VIMAL Szymanski notified that infectious disease only consults on weekend if MD or PA contact ID MD directly. Phone numbers provided to [facility].

## 2017-03-17 NOTE — PROGRESS NOTES
[facility] Dosing Services: 3/17/17    Pharmacist Renal Dosing Progress Note for Zosyn   Physician Dr. Fredrick Wilson    The following medication: Zosyn was automatically dose-adjusted per [facility] P&T Committee Protocol, with respect to renal function. Pt Weight:   Wt Readings from Last 1 Encounters:   03/17/17 91.6 kg (202 lb)     Dosage changed to:  Zosyn 3.375 g IV q12h over 4 hours    Previous Regimen Zosyn 3.375 g IV q6h over 30 min   Serum Creatinine Lab Results   Component Value Date/Time    Creatinine 8.09 03/14/2017 03:37 PM    Creatinine (POC) 4.7 03/17/2017 01:12 PM       Creatinine Clearance Estimated Creatinine Clearance: 10.6 mL/min (based on Cr of 8.09). BUN Lab Results   Component Value Date/Time    BUN 58 03/14/2017 03:37 PM    BUN (POC) 37 03/17/2017 01:12 PM             Pharmacy to continue to monitor patient daily. Will make dosage adjustments based upon changing renal function. Signed Myles Diaz.  Contact information:  [phone]

## 2017-03-17 NOTE — ANESTHESIA PROCEDURE NOTES
Peripheral Block    Start time: 3/17/2017 2:47 PM  End time: 3/17/2017 2:47 PM  Performed by: Annika Hood  Authorized by: Annkia Hood       Pre-procedure: Indications: at surgeon's request and primary anesthetic    Preanesthetic Checklist: patient identified, risks and benefits discussed, site marked, timeout performed and anesthesia consent given      Block Type:   Block Type:   Ankle  Monitoring:  Continuous pulse ox, frequent vital sign checks, heart rate, responsive to questions and oxygen  Injection Technique:  Single shot  Patient Position: seated  Prep: chlorhexidine    Location:  Foot  Needle Localization:  Anatomical landmarks, infiltration and ultrasound guidance  Medication Injected:  0.5%  ropivacaine  Add'l Medication Injected:  2.0%  mepivacaine    Assessment:  Number of attempts:  1  Injection Assessment:  Incremental injection every 5 mL, local visualized surrounding nerve on ultrasound, negative aspiration for blood, no paresthesia and no intravascular symptoms  Patient tolerance:  Patient tolerated the procedure well with no immediate complications

## 2017-03-17 NOTE — IP AVS SNAPSHOT
[address] [facility] 104 1007 [facility] 
[phone] Patient: Betsy Black MRN: [id number] :1955 You are allergic to the following Allergen Reactions Zoster Vaccine Live Hives Recent Documentation Height Weight BMI Smoking Status 1.854 m 92.6 kg 26.93 kg/m2 Never Smoker Emergency Contacts Name Discharge Info Relation Home Work Mobile Trisha Shearer DISCHARGE CAREGIVER [3] Sister [23] [phone] About your hospitalization You were admitted on:  2017 You last received care in the:  [facility] 4M POST SURG ORT 1 You were discharged on:  2017 Unit phone number:  [phone] Why you were hospitalized Your primary diagnosis was:  Abscess Of Left Foot Your diagnoses also included:  Dm Type 2 Causing Renal Disease (Hcc), Esrd On Dialysis (Hcc), Cad (Coronary Artery Disease), History Of Cva (Cerebrovascular Accident), Htn (Hypertension) Providers Seen During Your Hospitalizations Provider Role Specialty Primary office phone Jean Claude Love MD Attending Provider Orthopedic Surgery [phone] Your Primary Care Physician (PCP) Primary Care Physician Office Phone Office Fax [facility] [phone] Follow-up Information Follow up With Details Comments Contact Info Nancy Wilson MD   [address] [place] [address] 
[phone] Jean Claude Love MD Schedule an appointment as soon as possible for a visit in 1 week  [address] [facility] 201 [facility] 
[phone] [address] [place] [address], [address] Current Discharge Medication List  
  
START taking these medications Dose & Instructions Dispensing Information Comments Morning Noon Evening Bedtime amoxicillin-clavulanate 500-125 mg per tablet Commonly known as:  AUGMENTIN Your last dose was: Your next dose is:    
   
   
 Dose:  1 Tab Take 1 Tab by mouth daily for 10 days. Quantity:  10 Tab Refills:  0 CONTINUE these medications which have NOT CHANGED Dose & Instructions Dispensing Information Comments Morning Noon Evening Bedtime  
 aspirin delayed-release 81 mg tablet Your last dose was: Your next dose is:    
   
   
 Dose:  81 mg Take 81 mg by mouth daily. Refills:  0  
     
   
   
   
  
 calcium acetate 667 mg Cap Commonly known as:  PHOSLO Your last dose was: Your next dose is:    
   
   
 Dose:  6 Cap Take 6 Caps by mouth three (3) times daily (with meals). Refills:  0 DIALYVITE PO Your last dose was: Your next dose is:    
   
   
 Dose:  1 Tab Take 1 Tab by mouth daily. Refills:  0  
     
   
   
   
  
 docusate sodium 100 mg capsule Commonly known as:  Maretta Hutchinson Your last dose was: Your next dose is:    
   
   
 Dose:  100 mg Take 100 mg by mouth two (2) times daily as needed. Refills:  0  
     
   
   
   
  
 isosorbide mononitrate ER 60 mg CR tablet Commonly known as:  IMDUR Your last dose was: Your next dose is:    
   
   
 Dose:  60 mg Take 60 mg by mouth daily. Hold during dialysis sessions Refills:  0  
     
   
   
   
  
 LANTUS 100 unit/mL injection Generic drug:  insulin glargine Your last dose was: Your next dose is:    
   
   
 Dose:  20 Units 20 Units by SubCUTAneous route daily. Refills:  0  
     
   
   
   
  
 latanoprost 0.005 % ophthalmic solution Commonly known as:  Tonye Rist Your last dose was: Your next dose is:    
   
   
 Dose:  1 Drop Administer 1 Drop to both eyes nightly. Refills:  0 levobunolol 0.5 % ophthalmic solution Commonly known as:  Oksana Sleight Your last dose was: Your next dose is:    
   
   
 Dose:  1 Drop Administer 1 Drop to both eyes daily. Refills:  0  
     
   
   
   
  
 simvastatin 40 mg tablet Commonly known as:  ZOCOR Your last dose was: Your next dose is:    
   
   
 Dose:  40 mg Take 40 mg by mouth nightly. Refills:  0  
     
   
   
   
  
 tamsulosin 0.4 mg capsule Commonly known as:  FLOMAX Your last dose was: Your next dose is:    
   
   
 Dose:  0.4 mg Take 0.4 mg by mouth nightly. Refills:  0  
     
   
   
   
  
 traZODone 50 mg tablet Commonly known as:  [place] Askjulio cesar Your last dose was: Your next dose is:    
   
   
 Dose:  150 mg Take 150 mg by mouth nightly. Refills:  0 STOP taking these medications   
 ciprofloxacin HCl 500 mg tablet Commonly known as:  CIPRO Where to Get Your Medications Information on where to get these meds will be given to you by the nurse or doctor. ! Ask your nurse or doctor about these medications  
  amoxicillin-clavulanate 500-125 mg per tablet Discharge Instructions Foot and Ankle Surgery Postop Care Dr. Megan Mancia Most importantly, go home and rest. 
 
Elevate your foot to heart level as much as possible. You may use ice to help with the pain. Note: Frozen peas work fine :) Loosen and rewrap ace bandage in 4 to 5 hours if it feels too tight. Do NOT bear weight on your foot unless specifically allowed to do so. (Touch down for balance is okay.) You may bathe, but you MUST keep the dressing dry. Important signs and symptoms:   
 
If any of the following signs and symptoms occurs, you should contact Dr. Syd Tony [facility] office.   Please be advised if a problem arises which you feel requires immediate medical attention or you are unable to contact Dr. Suellen Jackson [facility] office you should seek immediate medical attention. Signs and symptoms to watch for include: 1. A sudden increase in swelling and /or redness or warmth at the area your surgery was performed which isnt relieved by rest, ice and elevation. 2.  Oral temperature greater than 101 degrees for 12 hours or more which isnt relieved by an increase in fluid intake and taking two Tylenol every 4-6 hours. Do not exceed 4000mg of Tylenol per day. 3.  Excessive drainage from your incisions or drainage that hasnt stopped by 72 hours. 4.  Calf pain, tenderness, redness or swelling which isnt relieved with rest and elevation. 5.  Fever, chills, nausea, vomiting or other signs and symptoms which are of concern to you. 6.  If sudden shortness of breath or chest pain occur, go to the ER or call an ambulance immediately! Call Dr. Rona Machuca on your way to the hospital or after you arrive. Follow up: 
 
Call our office at [phone] to make an appointment for the end of this week or beginning of next week. Call our office at [phone] ext [phone] at any time should any complications occur, especially if you develop a fever above 101° F. Dr. Royer Mittal MD & Team 
 
 
 
 
 
 
 
 
 
 
  
 
 
Discharge Orders None Introducing [facility]! [facility] introduces [employer] patient portal. Now you can access parts of your medical record, email your doctor's office, and request medication refills online. 1. In your internet browser, go to [email or web address] 2. Click on the First Time User? Click Here link in the Sign In box. You will see the New Member Sign Up page. 3. Enter your [employer] Access Code exactly as it appears below. You will not need to use this code after youve completed the sign-up process. If you do not sign up before the expiration date, you must request a new code. · [employer] Access Code: [email or web address] Expires: 6/12/2017 10:54 AM 
 
4. Enter the last four digits of your Social Security Number (xxxx) and Date of Birth (mm/dd/yyyy) as indicated and click Submit. You will be taken to the next sign-up page. 5. Create a [employer] ID. This will be your [employer] login ID and cannot be changed, so think of one that is secure and easy to remember. 6. Create a [employer] password. You can change your password at any time. 7. Enter your Password Reset Question and Answer. This can be used at a later time if you forget your password. 8. Enter your e-mail address. You will receive e-mail notification when new information is available in [address] 9. Click Sign Up. You can now view and download portions of your medical record. 10. Click the Download Summary menu link to download a portable copy of your medical information. If you have questions, please visit the Frequently Asked Questions section of the [employer] website. Remember, [employer] is NOT to be used for urgent needs. For medical emergencies, dial 911. Now available from your iPhone and Android! General Information Please provide this summary of care documentation to your next provider. Patient Signature:  ____________________________________________________________ Date:  ____________________________________________________________  
  
Prema Rossi Provider Signature:  ____________________________________________________________ Date:  ____________________________________________________________

## 2017-03-17 NOTE — ANESTHESIA PREPROCEDURE EVALUATION
Anesthetic History   No history of anesthetic complications            Review of Systems / Medical History  Patient summary reviewed, nursing notes reviewed and pertinent labs reviewed    Pulmonary  Within defined limits                 Neuro/Psych       CVA (left sided weakness)       Cardiovascular    Hypertension          CAD and CABG (3 vessel, 3 years ago)    Exercise tolerance: >4 METS     GI/Hepatic/Renal         Renal disease (Hd this morning): ESRD       Endo/Other    Diabetes: using insulin         Other Findings              Physical Exam    Airway  Mallampati: II  TM Distance: 4 - 6 cm  Neck ROM: normal range of motion   Mouth opening: Normal     Cardiovascular    Rhythm: regular  Rate: normal         Dental    Dentition: Lower dentition intact and Upper dentition intact     Pulmonary  Breath sounds clear to auscultation               Abdominal         Other Findings            Anesthetic Plan    ASA: 3  Anesthesia type: regional - ankle block            Anesthetic plan and risks discussed with: Patient

## 2017-03-17 NOTE — IP AVS SNAPSHOT
Current Discharge Medication List  
  
START taking these medications Dose & Instructions Dispensing Information Comments Morning Noon Evening Bedtime  
 amoxicillin-clavulanate 500-125 mg per tablet Commonly known as:  AUGMENTIN Your last dose was: Your next dose is:    
   
   
 Dose:  1 Tab Take 1 Tab by mouth daily for 10 days. Quantity:  10 Tab Refills:  0 CONTINUE these medications which have NOT CHANGED Dose & Instructions Dispensing Information Comments Morning Noon Evening Bedtime  
 aspirin delayed-release 81 mg tablet Your last dose was: Your next dose is:    
   
   
 Dose:  81 mg Take 81 mg by mouth daily. Refills:  0  
     
   
   
   
  
 calcium acetate 667 mg Cap Commonly known as:  PHOSLO Your last dose was: Your next dose is:    
   
   
 Dose:  6 Cap Take 6 Caps by mouth three (3) times daily (with meals). Refills:  0 DIALYVITE PO Your last dose was: Your next dose is:    
   
   
 Dose:  1 Tab Take 1 Tab by mouth daily. Refills:  0  
     
   
   
   
  
 docusate sodium 100 mg capsule Commonly known as:  [place] Peels Your last dose was: Your next dose is:    
   
   
 Dose:  100 mg Take 100 mg by mouth two (2) times daily as needed. Refills:  0  
     
   
   
   
  
 isosorbide mononitrate ER 60 mg CR tablet Commonly known as:  IMDUR Your last dose was: Your next dose is:    
   
   
 Dose:  60 mg Take 60 mg by mouth daily. Hold during dialysis sessions Refills:  0  
     
   
   
   
  
 LANTUS 100 unit/mL injection Generic drug:  insulin glargine Your last dose was: Your next dose is:    
   
   
 Dose:  20 Units 20 Units by SubCUTAneous route daily. Refills:  0  
     
   
   
   
  
 latanoprost 0.005 % ophthalmic solution Commonly known as:  [place] Annamarie  
   
 Your last dose was: Your next dose is:    
   
   
 Dose:  1 Drop Administer 1 Drop to both eyes nightly. Refills:  0  
     
   
   
   
  
 levobunolol 0.5 % ophthalmic solution Commonly known as:  Gurinder  Your last dose was: Your next dose is:    
   
   
 Dose:  1 Drop Administer 1 Drop to both eyes daily. Refills:  0  
     
   
   
   
  
 simvastatin 40 mg tablet Commonly known as:  ZOCOR Your last dose was: Your next dose is:    
   
   
 Dose:  40 mg Take 40 mg by mouth nightly. Refills:  0  
     
   
   
   
  
 tamsulosin 0.4 mg capsule Commonly known as:  FLOMAX Your last dose was: Your next dose is:    
   
   
 Dose:  0.4 mg Take 0.4 mg by mouth nightly. Refills:  0  
     
   
   
   
  
 traZODone 50 mg tablet Commonly known as:  Ry Rupert Your last dose was: Your next dose is:    
   
   
 Dose:  150 mg Take 150 mg by mouth nightly. Refills:  0 STOP taking these medications   
 ciprofloxacin HCl 500 mg tablet Commonly known as:  CIPRO Where to Get Your Medications Information on where to get these meds will be given to you by the nurse or doctor. ! Ask your nurse or doctor about these medications  
  amoxicillin-clavulanate 500-125 mg per tablet

## 2017-03-17 NOTE — ANESTHESIA POSTPROCEDURE EVALUATION
Post-Anesthesia Evaluation and Assessment    Patient: Phoenix Alvarenga MRN: [id number]  SSN: [id number]    YOB: 1955  Age: 59 y.o. Sex: male       Cardiovascular Function/Vital Signs  Visit Vitals    /52    Pulse 73    Temp 36.7 °C (98.1 °F)    Resp 12    Ht 6' 1\" (1.854 m)    Wt 91.6 kg (202 lb)    SpO2 98%    BMI 26.65 kg/m2       Patient is status post regional anesthesia for Procedure(s):  INCISION AND DRAINAGE LEFT FOOT  . Nausea/Vomiting: None    Postoperative hydration reviewed and adequate. Pain:  Pain Scale 1: Numeric (0 - 10) (03/17/17 1530)  Pain Intensity 1: 0 (03/17/17 1530)   Managed    Neurological Status:   Neuro (WDL): Exceptions to WDL (03/17/17 1530)  Neuro  Neurologic State: Drowsy (03/17/17 1530)  LUE Motor Response: Weak;Purposeful (03/17/17 1335)  LLE Motor Response: Weak;Purposeful (03/17/17 1335)   At baseline    Mental Status and Level of Consciousness: Arousable    Pulmonary Status:   O2 Device: Nasal cannula (03/17/17 1530)   Adequate oxygenation and airway patent    Complications related to anesthesia: None    Post-anesthesia assessment completed.  No concerns    Signed By: Karen Jamil MD     March 17, 2017

## 2017-03-18 LAB
ALBUMIN SERPL BCP-MCNC: 3.3 G/DL (ref 3.5–5)
ALBUMIN/GLOB SERPL: 0.7 {RATIO} (ref 1.1–2.2)
ALP SERPL-CCNC: 66 U/L (ref 45–117)
ALT SERPL-CCNC: 17 U/L (ref 12–78)
ANION GAP BLD CALC-SCNC: 12 MMOL/L (ref 5–15)
AST SERPL W P-5'-P-CCNC: 14 U/L (ref 15–37)
BASOPHILS # BLD AUTO: 0 K/UL (ref 0–0.1)
BASOPHILS # BLD: 0 % (ref 0–1)
BILIRUB SERPL-MCNC: 0.5 MG/DL (ref 0.2–1)
BUN SERPL-MCNC: 48 MG/DL (ref 6–20)
BUN/CREAT SERPL: 7 (ref 12–20)
CALCIUM SERPL-MCNC: 8.6 MG/DL (ref 8.5–10.1)
CHLORIDE SERPL-SCNC: 97 MMOL/L (ref 97–108)
CO2 SERPL-SCNC: 29 MMOL/L (ref 21–32)
CREAT SERPL-MCNC: 6.67 MG/DL (ref 0.7–1.3)
EOSINOPHIL # BLD: 0.2 K/UL (ref 0–0.4)
EOSINOPHIL NFR BLD: 3 % (ref 0–7)
ERYTHROCYTE [DISTWIDTH] IN BLOOD BY AUTOMATED COUNT: 14.4 % (ref 11.5–14.5)
GLOBULIN SER CALC-MCNC: 4.7 G/DL (ref 2–4)
GLUCOSE BLD STRIP.AUTO-MCNC: 108 MG/DL (ref 65–100)
GLUCOSE BLD STRIP.AUTO-MCNC: 188 MG/DL (ref 65–100)
GLUCOSE BLD STRIP.AUTO-MCNC: 247 MG/DL (ref 65–100)
GLUCOSE BLD STRIP.AUTO-MCNC: 270 MG/DL (ref 65–100)
GLUCOSE SERPL-MCNC: 108 MG/DL (ref 65–100)
HCT VFR BLD AUTO: 33.7 % (ref 36.6–50.3)
HGB BLD-MCNC: 11.3 G/DL (ref 12.1–17)
LYMPHOCYTES # BLD AUTO: 28 % (ref 12–49)
LYMPHOCYTES # BLD: 2 K/UL (ref 0.8–3.5)
MCH RBC QN AUTO: 33.1 PG (ref 26–34)
MCHC RBC AUTO-ENTMCNC: 33.5 G/DL (ref 30–36.5)
MCV RBC AUTO: 98.8 FL (ref 80–99)
MONOCYTES # BLD: 0.4 K/UL (ref 0–1)
MONOCYTES NFR BLD AUTO: 6 % (ref 5–13)
NEUTS SEG # BLD: 4.5 K/UL (ref 1.8–8)
NEUTS SEG NFR BLD AUTO: 63 % (ref 32–75)
PLATELET # BLD AUTO: 211 K/UL (ref 150–400)
POTASSIUM SERPL-SCNC: 4.2 MMOL/L (ref 3.5–5.1)
PROT SERPL-MCNC: 8 G/DL (ref 6.4–8.2)
RBC # BLD AUTO: 3.41 M/UL (ref 4.1–5.7)
SERVICE CMNT-IMP: ABNORMAL
SODIUM SERPL-SCNC: 138 MMOL/L (ref 136–145)
WBC # BLD AUTO: 7.1 K/UL (ref 4.1–11.1)

## 2017-03-18 PROCEDURE — 74011250637 HC RX REV CODE- 250/637: Performed by: NURSE PRACTITIONER

## 2017-03-18 PROCEDURE — 82962 GLUCOSE BLOOD TEST: CPT

## 2017-03-18 PROCEDURE — 85025 COMPLETE CBC W/AUTO DIFF WBC: CPT | Performed by: INTERNAL MEDICINE

## 2017-03-18 PROCEDURE — 74011250636 HC RX REV CODE- 250/636: Performed by: INTERNAL MEDICINE

## 2017-03-18 PROCEDURE — 36415 COLL VENOUS BLD VENIPUNCTURE: CPT | Performed by: INTERNAL MEDICINE

## 2017-03-18 PROCEDURE — 74011000250 HC RX REV CODE- 250: Performed by: NURSE PRACTITIONER

## 2017-03-18 PROCEDURE — 65270000029 HC RM PRIVATE

## 2017-03-18 PROCEDURE — 74011000258 HC RX REV CODE- 258: Performed by: INTERNAL MEDICINE

## 2017-03-18 PROCEDURE — 80053 COMPREHEN METABOLIC PANEL: CPT | Performed by: INTERNAL MEDICINE

## 2017-03-18 RX ADMIN — DOCUSATE SODIUM 100 MG: 100 CAPSULE ORAL at 17:32

## 2017-03-18 RX ADMIN — SIMVASTATIN 40 MG: 20 TABLET, FILM COATED ORAL at 21:20

## 2017-03-18 RX ADMIN — OXYCODONE HYDROCHLORIDE AND ACETAMINOPHEN 1 TABLET: 5; 325 TABLET ORAL at 03:23

## 2017-03-18 RX ADMIN — LEVOBUNOLOL HYDROCHLORIDE 1 DROP: 5 SOLUTION/ DROPS OPHTHALMIC at 10:54

## 2017-03-18 RX ADMIN — LATANOPROST 1 DROP: 50 SOLUTION OPHTHALMIC at 21:20

## 2017-03-18 RX ADMIN — TAMSULOSIN HYDROCHLORIDE 0.4 MG: 0.4 CAPSULE ORAL at 21:20

## 2017-03-18 RX ADMIN — TRAZODONE HYDROCHLORIDE 150 MG: 100 TABLET ORAL at 21:22

## 2017-03-18 RX ADMIN — PIPERACILLIN SODIUM,TAZOBACTAM SODIUM 3.38 G: 3; .375 INJECTION, POWDER, FOR SOLUTION INTRAVENOUS at 05:28

## 2017-03-18 RX ADMIN — INSULIN LISPRO 3 UNITS: 100 INJECTION, SOLUTION INTRAVENOUS; SUBCUTANEOUS at 22:00

## 2017-03-18 RX ADMIN — PIPERACILLIN SODIUM,TAZOBACTAM SODIUM 3.38 G: 3; .375 INJECTION, POWDER, FOR SOLUTION INTRAVENOUS at 17:32

## 2017-03-18 RX ADMIN — INSULIN GLARGINE 10 UNITS: 100 INJECTION, SOLUTION SUBCUTANEOUS at 22:00

## 2017-03-18 RX ADMIN — ASPIRIN 81 MG: 81 TABLET, COATED ORAL at 09:54

## 2017-03-18 RX ADMIN — DOCUSATE SODIUM 100 MG: 100 CAPSULE ORAL at 09:54

## 2017-03-18 RX ADMIN — ISOSORBIDE MONONITRATE 60 MG: 30 TABLET ORAL at 09:55

## 2017-03-18 RX ADMIN — INSULIN LISPRO 3 UNITS: 100 INJECTION, SOLUTION INTRAVENOUS; SUBCUTANEOUS at 17:31

## 2017-03-18 RX ADMIN — INSULIN LISPRO 2 UNITS: 100 INJECTION, SOLUTION INTRAVENOUS; SUBCUTANEOUS at 12:43

## 2017-03-18 NOTE — PROGRESS NOTES
Primary Nurse Jessica Taylor RN, RN performed a dual skin assessment on this patient No impairment noted  Jose score is 20

## 2017-03-18 NOTE — PROGRESS NOTES
Will continue to follow: no changes today in medical regimen. Continue zosyn/vanco. Cultures pending. ID and renal on board. Will round tomorrow.

## 2017-03-18 NOTE — PROGRESS NOTES
PA notified that pain medicine and other typical surgery associated orders have not yet been entered for this patient.

## 2017-03-18 NOTE — PROGRESS NOTES
Bedside and Verbal shift change report given to Yu bar RN (oncoming nurse) by Bryatn Spence RN (offgoing nurse). Report included the following information SBAR, OR Summary, MAR and Recent Results.

## 2017-03-18 NOTE — PROGRESS NOTES
Bedside shift change report given to Jesusita Perez (oncoming nurse) by Monica Bellamy (offgoing nurse). Report included the following information SBAR, Kardex, Intake/Output and MAR.

## 2017-03-18 NOTE — PROGRESS NOTES
Orthopaedic Progress Note  Post Op day: 1 Day Post-Op    2017 4:29 PM     Patient: Richy Jolly MRN: [id number]  SSN: [id number]    YOB: 1955  Age: 59 y.o. Sex: male      Admit date:  3/17/2017  Date of Surgery:  3/17/2017   Procedures:  Procedure(s):  INCISION AND DRAINAGE LEFT FOOT    Admitting Physician:  Marni Sofia MD   Surgeon:  Samanhta Burrell) and Role:     * Marni Sofia MD - Primary    Consulting Physician(s): Treatment Team: Attending Provider: Marni Sofia MD; Consulting Provider: Roscoe North MD; Utilization Review: Jani Schafefer RN    SUBJECTIVE:     Richy Jolly is a 59 y.o. male is 1 Day Post-Op s/p Procedure(s):  INCISION AND DRAINAGE LEFT FOOT   with an appropriate level of post-operative pain. No complaints of nausea, vomiting, dizziness, lightheadedness, chest pain, or shortness of breath. OBJECTIVE:       Physical Exam:  General: Alert, cooperative, no distress. Respiratory: Respirations unlabored  Neurological:  Neurovascular exam within normal limits. Motor: + DF/PF. Musculoskeletal: Calves soft, supple, non-tender upon palpation. Dressing/Wound:  Clean, dry and intact. No significant erythema or swelling.       Vital Signs:      Patient Vitals for the past 8 hrs:   BP Temp Pulse Resp SpO2   17 1454 100/52 98 °F (36.7 °C) 74 18 98 %   17 1256 90/50 98.3 °F (36.8 °C) 79 18 97 %   17 0955 126/61 - 80 - -                                          Temp (24hrs), Av.3 °F (36.8 °C), Min:97.3 °F (36.3 °C), Max:98.7 °F (37.1 °C)      Labs:        Recent Labs      17   0209   HCT  33.7*   HGB  11.3*     Lab Results   Component Value Date/Time    Sodium 138 2017 02:09 AM    Potassium 4.2 2017 02:09 AM    Chloride 97 2017 02:09 AM    CO2 29 2017 02:09 AM    Glucose 108 2017 02:09 AM    BUN 48 2017 02:09 AM    Creatinine 6.67 2017 02:09 AM    Calcium 8.6 2017 02:09 AM PT/OT:                Patient mobility                         ASSESSMENT / PLAN:   Principal Problem:    Abscess of left foot (3/17/2017)    Active Problems:    HTN (hypertension) (6/8/2016)      DM type 2 causing renal disease ([facility] [address] (6/8/2016)      ESRD on dialysis [facility]) (6/8/2016)      CAD (coronary artery disease) (6/8/2016)      History of CVA (cerebrovascular accident) (6/8/2016)                POD 1: s.p left I and D foot; cultures in office grew Brisa Plater   Awaiting ID consult; left message today   Currently on vanc/ zosyn   Dressing change to be done tomorrow. Pt with appropriate pain control. Neda Munoz NP    Signed By:  Neda Munoz NP    [address] [facility]. [phone]

## 2017-03-18 NOTE — PROGRESS NOTES
Problem: Pressure Ulcer - Risk of  Goal: *Prevention of pressure ulcer  Outcome: Progressing Towards Goal  Heels elevated with pillow

## 2017-03-18 NOTE — PROGRESS NOTES
Problem: Pain  Goal: *Control of Pain  Outcome: Progressing Towards Goal  Pain controlled with PRN medication

## 2017-03-18 NOTE — PROGRESS NOTES
Bedside and Verbal shift change report given to Raymundo Schulz RN (oncoming nurse) by Jocelyn Lemon RN (offgoing nurse). Report included the following information SBAR, Kardex, Procedure Summary, Intake/Output, MAR, Accordion and Recent Results.

## 2017-03-18 NOTE — CONSULTS
Daphne  YOB: 1955          Assessment & Plan:   1. ESRD  - MWF  - Left AVF  - HD [place] [place] aware  2. Left foot Abscess  - abx    3. DM  - SSI  4. HTN  - OK without meds       Subjective:   CC:esrd  HPI: Patient seen for HD Need. Pt gets HD at [address] [facility]. [place]. He had HD yesterday,says he completed it. Here with Left Foot abscess: s/p I and D  ROS:no cp/sob/n/v  Current Facility-Administered Medications   Medication Dose Route Frequency    piperacillin-tazobactam (ZOSYN) 3.375 g in 0.9% sodium chloride (MBP/ADV) 100 mL  3.375 g IntraVENous Q12H    aspirin delayed-release tablet 81 mg  81 mg Oral DAILY    docusate sodium (COLACE) capsule 100 mg  100 mg Oral BID    isosorbide mononitrate ER (IMDUR) tablet 60 mg  60 mg Oral DAILY    latanoprost (XALATAN) 0.005 % ophthalmic solution 1 Drop  1 Drop Both Eyes QHS    levobunolol (BETAGAN) 0.5 % ophthalmic solution 1 Drop  1 Drop Both Eyes DAILY    simvastatin (ZOCOR) tablet 40 mg  40 mg Oral QHS    tamsulosin (FLOMAX) capsule 0.4 mg  0.4 mg Oral QHS    traZODone (DESYREL) tablet 150 mg  150 mg Oral QHS    HYDROmorphone (PF) (DILAUDID) injection 0.5 mg  0.5 mg IntraVENous Q4H PRN    oxyCODONE-acetaminophen (PERCOCET) 5-325 mg per tablet 1 Tab  1 Tab Oral Q4H PRN    Vancomycin dosing per levels   Other Rx Dosing/Monitoring    insulin lispro (HUMALOG) injection   SubCUTAneous QID WITH MEALS    glucose chewable tablet 16 g  4 Tab Oral PRN    dextrose (D50W) injection syrg 12.5-25 g  12.5-25 g IntraVENous PRN    glucagon (GLUCAGEN) injection 1 mg  1 mg IntraMUSCular PRN    insulin glargine (LANTUS) injection 10 Units  10 Units SubCUTAneous QHS          Objective:     Vitals:  Blood pressure 126/61, pulse 80, temperature 98.6 °F (37 °C), resp. rate 18, height 6' 1\" (1.854 m), weight 91.6 kg (202 lb), SpO2 96 %.   Temp (24hrs), Av.3 °F (36.8 °C), Min:97.3 °F (36.3 °C), Max:98.7 °F (37.1 °C)      Intake and Output:     03/16 1901 - 03/18 0700  In: 400 [I.V.:400]  Out: -     Physical Exam:                Patient is intubated:  no    Physical Examination:   GENERAL ASSESSMENT: NAD  HEENT:Nontraumatic   CHEST: CTA  HEART: S1S2  ABDOMEN: Soft,NT,  :Wilkinson: n  EXTREMITY: EDEMA n  NEURO:Grossly non focal          ECG/rhythm:    Data Review      No results for input(s): TNIPOC in the last 72 hours. No lab exists for component: ITNL   No results for input(s): CPK, CKMB, TROIQ in the last 72 hours. Recent Labs      03/18/17   0209   NA  138   K  4.2   CL  97   CO2  29   BUN  48*   CREA  6.67*   GLU  108*   CA  8.6   ALB  3.3*   WBC  7.1   HGB  11.3*   HCT  33.7*   PLT  211      No results for input(s): INR, PTP, APTT in the last 72 hours. No lab exists for component: INREXT  Needs: urine analysis, urine sodium, protein and creatinine  No results found for: NICOLETTE MEJIA      Discussed with:  pt    : Olimpia Bolanos MD  3/18/2017        [facility]:  [email or web address]  [place]ari[place] office:  [address] [place] [address], [address] [place] [address],[address]  [address]  Phone: [phone]  Fax :     [phone]    [place] office:  [address]  Phone - [phone]  Fax - [phone]

## 2017-03-19 LAB
BACTERIA SPEC CULT: NORMAL
BACTERIA SPEC CULT: NORMAL
EST. AVERAGE GLUCOSE BLD GHB EST-MCNC: 200 MG/DL
GLUCOSE BLD STRIP.AUTO-MCNC: 211 MG/DL (ref 65–100)
GLUCOSE BLD STRIP.AUTO-MCNC: 216 MG/DL (ref 65–100)
GLUCOSE BLD STRIP.AUTO-MCNC: 233 MG/DL (ref 65–100)
GLUCOSE BLD STRIP.AUTO-MCNC: 95 MG/DL (ref 65–100)
GRAM STN SPEC: NORMAL
GRAM STN SPEC: NORMAL
HBA1C MFR BLD: 8.6 % (ref 4.2–6.3)
SERVICE CMNT-IMP: ABNORMAL
SERVICE CMNT-IMP: NORMAL

## 2017-03-19 PROCEDURE — 65270000029 HC RM PRIVATE

## 2017-03-19 PROCEDURE — 36415 COLL VENOUS BLD VENIPUNCTURE: CPT | Performed by: INTERNAL MEDICINE

## 2017-03-19 PROCEDURE — 74011250636 HC RX REV CODE- 250/636: Performed by: INTERNAL MEDICINE

## 2017-03-19 PROCEDURE — 82962 GLUCOSE BLOOD TEST: CPT

## 2017-03-19 PROCEDURE — 74011250637 HC RX REV CODE- 250/637: Performed by: NURSE PRACTITIONER

## 2017-03-19 PROCEDURE — 74011000258 HC RX REV CODE- 258: Performed by: INTERNAL MEDICINE

## 2017-03-19 PROCEDURE — 83036 HEMOGLOBIN GLYCOSYLATED A1C: CPT | Performed by: INTERNAL MEDICINE

## 2017-03-19 RX ORDER — DEXTROSE 50 % IN WATER (D50W) INTRAVENOUS SYRINGE
12.5-25 AS NEEDED
Status: DISCONTINUED | OUTPATIENT
Start: 2017-03-19 | End: 2017-03-20 | Stop reason: HOSPADM

## 2017-03-19 RX ORDER — INSULIN LISPRO 100 [IU]/ML
INJECTION, SOLUTION INTRAVENOUS; SUBCUTANEOUS
Status: DISCONTINUED | OUTPATIENT
Start: 2017-03-19 | End: 2017-03-20 | Stop reason: HOSPADM

## 2017-03-19 RX ADMIN — DOCUSATE SODIUM 100 MG: 100 CAPSULE ORAL at 09:36

## 2017-03-19 RX ADMIN — ISOSORBIDE MONONITRATE 60 MG: 30 TABLET ORAL at 09:35

## 2017-03-19 RX ADMIN — PIPERACILLIN SODIUM,TAZOBACTAM SODIUM 3.38 G: 3; .375 INJECTION, POWDER, FOR SOLUTION INTRAVENOUS at 05:58

## 2017-03-19 RX ADMIN — LEVOBUNOLOL HYDROCHLORIDE 1 DROP: 5 SOLUTION/ DROPS OPHTHALMIC at 11:38

## 2017-03-19 RX ADMIN — INSULIN LISPRO 4 UNITS: 100 INJECTION, SOLUTION INTRAVENOUS; SUBCUTANEOUS at 19:29

## 2017-03-19 RX ADMIN — DOCUSATE SODIUM 100 MG: 100 CAPSULE ORAL at 19:30

## 2017-03-19 RX ADMIN — SIMVASTATIN 40 MG: 20 TABLET, FILM COATED ORAL at 22:01

## 2017-03-19 RX ADMIN — ASPIRIN 81 MG: 81 TABLET, COATED ORAL at 09:36

## 2017-03-19 RX ADMIN — PIPERACILLIN SODIUM,TAZOBACTAM SODIUM 3.38 G: 3; .375 INJECTION, POWDER, FOR SOLUTION INTRAVENOUS at 19:29

## 2017-03-19 RX ADMIN — INSULIN LISPRO 4 UNITS: 100 INJECTION, SOLUTION INTRAVENOUS; SUBCUTANEOUS at 13:01

## 2017-03-19 RX ADMIN — INSULIN GLARGINE 10 UNITS: 100 INJECTION, SOLUTION SUBCUTANEOUS at 22:00

## 2017-03-19 RX ADMIN — TAMSULOSIN HYDROCHLORIDE 0.4 MG: 0.4 CAPSULE ORAL at 22:01

## 2017-03-19 RX ADMIN — TRAZODONE HYDROCHLORIDE 150 MG: 100 TABLET ORAL at 22:01

## 2017-03-19 RX ADMIN — INSULIN LISPRO 2 UNITS: 100 INJECTION, SOLUTION INTRAVENOUS; SUBCUTANEOUS at 22:00

## 2017-03-19 RX ADMIN — LATANOPROST 1 DROP: 50 SOLUTION OPHTHALMIC at 22:01

## 2017-03-19 NOTE — CONSULTS
Theodore Torres [facility]  [address] [facility], [place], [address] [facility]  [phone]      Medical Progress Note      NAME: Edson Perez   :  1955  MRM:  [id number]    Date/Time: 3/19/2017  12:34 PM         Subjective:     Chief Complaint:  \"I'm okay\"     Pt without complaints today. Awaiting dressing change via ortho. Pain controlled. ROS:  (bold if positive, if negative)           Objective:       Vitals:          Last 24hrs VS reviewed since prior progress note. Most recent are:    Visit Vitals    /59 (BP 1 Location: Right arm, BP Patient Position: At rest)    Pulse 78    Temp 98.2 °F (36.8 °C)    Resp 18    Ht 6' 1\" (1.854 m)    Wt 91.6 kg (202 lb)    SpO2 94%    BMI 26.65 kg/m2     SpO2 Readings from Last 6 Encounters:   17 94%   17 95%   16 94%   06/16/15 97%   14 100%    O2 Flow Rate (L/min): 2 l/min   No intake or output data in the 24 hours ending 17 1234       Exam:     Physical Exam:  Gen: NAD   Eyes: pink conjunctivae, PERRLA with no discharge. ENT: no ottorrhea or rhinorrhea, moist mucous membranes  Neck: Supple, no masses, thyroid non-tender with a central trachea. Pulm: clear breath sounds without crackles or wheezes  Card: no JVD or murmurs, has regular and normal S1, S2 without thrills, bruits or pedal edema  Abd: Soft, non-tender, non-distended, normoactive bowel sounds   Musc: No cyanosis or clubbing. No atrophy or deformities.  Dressed left foot  Skin: No rashes, bruising or ulcers, skin turgor is good  Neuro: Awake and alert, CNs 2-12 intact with a non focal exam. Follows commands appropriately  Psych: Oriented x 3, no hallucinations or delusions  LLE dressing C/D/I     Medications Reviewed: (see below)    Lab Data Reviewed: (see below)    ______________________________________________________________________    Medications:     Current Facility-Administered Medications   Medication Dose Route Frequency    insulin lispro (HUMALOG) injection   SubCUTAneous AC&HS    dextrose (D50W) injection syrg 12.5-25 g  12.5-25 g IntraVENous PRN    [START ON 3/20/2017] vancomycin (VANCOCIN) 500 mg in 0.9% sodium chloride (MBP/ADV) 100 mL  500 mg IntraVENous DIALYSIS MON, WED & FRI    [START ON 3/20/2017] Vancomycin 500 mg IVPB after each HD session   Other Rx Dosing/Monitoring    piperacillin-tazobactam (ZOSYN) 3.375 g in 0.9% sodium chloride (MBP/ADV) 100 mL  3.375 g IntraVENous Q12H    aspirin delayed-release tablet 81 mg  81 mg Oral DAILY    docusate sodium (COLACE) capsule 100 mg  100 mg Oral BID    isosorbide mononitrate ER (IMDUR) tablet 60 mg  60 mg Oral DAILY    latanoprost (XALATAN) 0.005 % ophthalmic solution 1 Drop  1 Drop Both Eyes QHS    levobunolol (BETAGAN) 0.5 % ophthalmic solution 1 Drop  1 Drop Both Eyes DAILY    simvastatin (ZOCOR) tablet 40 mg  40 mg Oral QHS    tamsulosin (FLOMAX) capsule 0.4 mg  0.4 mg Oral QHS    traZODone (DESYREL) tablet 150 mg  150 mg Oral QHS    HYDROmorphone (PF) (DILAUDID) injection 0.5 mg  0.5 mg IntraVENous Q4H PRN    oxyCODONE-acetaminophen (PERCOCET) 5-325 mg per tablet 1 Tab  1 Tab Oral Q4H PRN    glucose chewable tablet 16 g  4 Tab Oral PRN    dextrose (D50W) injection syrg 12.5-25 g  12.5-25 g IntraVENous PRN    glucagon (GLUCAGEN) injection 1 mg  1 mg IntraMUSCular PRN    insulin glargine (LANTUS) injection 10 Units  10 Units SubCUTAneous QHS            Lab Review:     Recent Labs      03/18/17   0209   WBC  7.1   HGB  11.3*   HCT  33.7*   PLT  211     Recent Labs      03/18/17   0209   NA  138   K  4.2   CL  97   CO2  29   GLU  108*   BUN  48*   CREA  6.67*   CA  8.6   ALB  3.3*   SGOT  14*   ALT  17     No components found for: GLPOC         Assessment / Plan:   Abscess of left foot (3/17/2017): s/p I+D   -wound cultures negative to date in house   -apparently outpt cultures (+) for Klebsiella  -continue zosyn and vanco for now; but suspect may be able to de-escalate to Augmentin (also provides some anaerobic cultures); awaiting ID rec's     HTN (hypertension) (6/8/2016):   -on Imdur which is providing some BP effect     DM type 2 causing renal disease ([facility] [address] (6/8/2016): prior A1c 7.6  -repeat A1c pending  -ISS; increased higher scale  -continue Lantus   -diabetic diet      ESRD on dialysis ([facility] [address] (6/8/2016):   -on HD MWF  -renal on board      CAD (coronary artery disease) (6/8/2016): s/p CABG   -continue ASA, statin, Imdur   -not on beta blocker; likely 2/2 HR      History of CVA (cerebrovascular accident) (6/8/2016):   -continue statin, ASA    Total time spent with patient: [address] discussed with: Patient     Discussed:  Code Status, Care Plan and D/C Planning    Prophylaxis:  As per surgery     Disposition:  Home w/Family           ___________________________________________________    Attending Physician: Cole Rosado MD

## 2017-03-19 NOTE — PROGRESS NOTES
Bedside and Verbal shift change report given to Dani Jeter RN (oncoming nurse) by Cruz Castillo RN (offgoing nurse). Report included the following information SBAR, Kardex, Procedure Summary, Intake/Output, MAR, Accordion and Recent Results.

## 2017-03-19 NOTE — PROGRESS NOTES
Patient is awake and oriented  Pain absent  Dressing changed and wound shows light purulent drainage but no erythema or fluctuance. Awaiting ID recommendations for outpt.  antibiotics

## 2017-03-20 VITALS
RESPIRATION RATE: 18 BRPM | OXYGEN SATURATION: 95 % | SYSTOLIC BLOOD PRESSURE: 143 MMHG | WEIGHT: 204.1 LBS | HEART RATE: 75 BPM | DIASTOLIC BLOOD PRESSURE: 64 MMHG | BODY MASS INDEX: 27.05 KG/M2 | TEMPERATURE: 97.5 F | HEIGHT: 73 IN

## 2017-03-20 LAB
ANION GAP BLD CALC-SCNC: 17 MMOL/L (ref 5–15)
BASOPHILS # BLD AUTO: 0 K/UL (ref 0–0.1)
BASOPHILS # BLD: 0 % (ref 0–1)
BUN SERPL-MCNC: 97 MG/DL (ref 6–20)
BUN/CREAT SERPL: 8 (ref 12–20)
CALCIUM SERPL-MCNC: 7.5 MG/DL (ref 8.5–10.1)
CHLORIDE SERPL-SCNC: 96 MMOL/L (ref 97–108)
CO2 SERPL-SCNC: 24 MMOL/L (ref 21–32)
CREAT SERPL-MCNC: 11.74 MG/DL (ref 0.7–1.3)
EOSINOPHIL # BLD: 0.2 K/UL (ref 0–0.4)
EOSINOPHIL NFR BLD: 3 % (ref 0–7)
ERYTHROCYTE [DISTWIDTH] IN BLOOD BY AUTOMATED COUNT: 14.6 % (ref 11.5–14.5)
GLUCOSE BLD STRIP.AUTO-MCNC: 112 MG/DL (ref 65–100)
GLUCOSE BLD STRIP.AUTO-MCNC: 135 MG/DL (ref 65–100)
GLUCOSE BLD STRIP.AUTO-MCNC: 229 MG/DL (ref 65–100)
GLUCOSE SERPL-MCNC: 139 MG/DL (ref 65–100)
HCT VFR BLD AUTO: 30.3 % (ref 36.6–50.3)
HGB BLD-MCNC: 9.7 G/DL (ref 12.1–17)
LYMPHOCYTES # BLD AUTO: 31 % (ref 12–49)
LYMPHOCYTES # BLD: 1.7 K/UL (ref 0.8–3.5)
MAGNESIUM SERPL-MCNC: 2.1 MG/DL (ref 1.6–2.4)
MCH RBC QN AUTO: 31.2 PG (ref 26–34)
MCHC RBC AUTO-ENTMCNC: 32 G/DL (ref 30–36.5)
MCV RBC AUTO: 97.4 FL (ref 80–99)
MONOCYTES # BLD: 0.2 K/UL (ref 0–1)
MONOCYTES NFR BLD AUTO: 4 % (ref 5–13)
NEUTS SEG # BLD: 3.4 K/UL (ref 1.8–8)
NEUTS SEG NFR BLD AUTO: 62 % (ref 32–75)
PLATELET # BLD AUTO: 230 K/UL (ref 150–400)
POTASSIUM SERPL-SCNC: 4.9 MMOL/L (ref 3.5–5.1)
RBC # BLD AUTO: 3.11 M/UL (ref 4.1–5.7)
SERVICE CMNT-IMP: ABNORMAL
SODIUM SERPL-SCNC: 137 MMOL/L (ref 136–145)
WBC # BLD AUTO: 5.4 K/UL (ref 4.1–11.1)

## 2017-03-20 PROCEDURE — 74011250637 HC RX REV CODE- 250/637: Performed by: NURSE PRACTITIONER

## 2017-03-20 PROCEDURE — 85025 COMPLETE CBC W/AUTO DIFF WBC: CPT | Performed by: INTERNAL MEDICINE

## 2017-03-20 PROCEDURE — 80048 BASIC METABOLIC PNL TOTAL CA: CPT | Performed by: INTERNAL MEDICINE

## 2017-03-20 PROCEDURE — 97162 PT EVAL MOD COMPLEX 30 MIN: CPT

## 2017-03-20 PROCEDURE — 36415 COLL VENOUS BLD VENIPUNCTURE: CPT | Performed by: INTERNAL MEDICINE

## 2017-03-20 PROCEDURE — 74011250636 HC RX REV CODE- 250/636: Performed by: INTERNAL MEDICINE

## 2017-03-20 PROCEDURE — 74011000258 HC RX REV CODE- 258: Performed by: INTERNAL MEDICINE

## 2017-03-20 PROCEDURE — 83735 ASSAY OF MAGNESIUM: CPT | Performed by: INTERNAL MEDICINE

## 2017-03-20 PROCEDURE — 5A1D00Z PERFORMANCE OF URINARY FILTRATION, SINGLE: ICD-10-PCS | Performed by: INTERNAL MEDICINE

## 2017-03-20 PROCEDURE — 97116 GAIT TRAINING THERAPY: CPT

## 2017-03-20 PROCEDURE — 90935 HEMODIALYSIS ONE EVALUATION: CPT

## 2017-03-20 PROCEDURE — 82962 GLUCOSE BLOOD TEST: CPT

## 2017-03-20 RX ORDER — INSULIN GLARGINE 100 [IU]/ML
20 INJECTION, SOLUTION SUBCUTANEOUS
Status: DISCONTINUED | OUTPATIENT
Start: 2017-03-20 | End: 2017-03-20 | Stop reason: HOSPADM

## 2017-03-20 RX ORDER — AMOXICILLIN AND CLAVULANATE POTASSIUM 500; 125 MG/1; MG/1
1 TABLET, FILM COATED ORAL DAILY
Qty: 10 TAB | Refills: 0 | Status: SHIPPED | OUTPATIENT
Start: 2017-03-20 | End: 2017-03-30

## 2017-03-20 RX ADMIN — ISOSORBIDE MONONITRATE 60 MG: 30 TABLET ORAL at 08:06

## 2017-03-20 RX ADMIN — LEVOBUNOLOL HYDROCHLORIDE 1 DROP: 5 SOLUTION/ DROPS OPHTHALMIC at 08:06

## 2017-03-20 RX ADMIN — PIPERACILLIN SODIUM,TAZOBACTAM SODIUM 3.38 G: 3; .375 INJECTION, POWDER, FOR SOLUTION INTRAVENOUS at 06:06

## 2017-03-20 RX ADMIN — VANCOMYCIN HYDROCHLORIDE 500 MG: 500 INJECTION, POWDER, LYOPHILIZED, FOR SOLUTION INTRAVENOUS at 16:32

## 2017-03-20 RX ADMIN — ASPIRIN 81 MG: 81 TABLET, COATED ORAL at 08:06

## 2017-03-20 RX ADMIN — INSULIN LISPRO 4 UNITS: 100 INJECTION, SOLUTION INTRAVENOUS; SUBCUTANEOUS at 11:47

## 2017-03-20 NOTE — DIABETES MGMT
[facility]    Elevated Blood Glucose Note (POCT Glucose >180 mg/dL)    Recommendations/ Comments: Chart reviewed for elevated blood glucose. Chrissie Davis is a 59 y.o. male with a past medical history significant for DM per Dr. Arpan Myers MD's Anesthesiology Preprocedure Evaluation dated 3/17/2017. Fasting glucose today: 139 mg/dL (per am lab). Required 10 units of correction in the last 24 hours. Noted Lantus 20 units to be started tonight. Will continue to follow. Recent Glucose Results:   Lab Results   Component Value Date/Time     (H) 03/20/2017 05:00 AM    GLUCPOC 135 (H) 03/20/2017 07:28 AM    GLUCPOC 216 (H) 03/19/2017 09:52 PM    GLUCPOC 233 (H) 03/19/2017 05:05 PM        Hospital (inpatient) medications:  1. Correction Scale: Lispro (Humalog) Insulin Resistant Sensitivity scale to cover for glucose > 139 mg/dL before meals and for glucose >199 at bedtime      2. Lantus 20 units - to start 3/20    Prior to admission medications: per past medical records  Lantus 20 units at bedtime     A1C:   Lab Results   Component Value Date/Time    Hemoglobin A1c 8.6 03/19/2017 04:39 PM    Hemoglobin A1c 7.6 06/09/2016 03:14 AM     Lab Results   Component Value Date/Time    Creatinine 11.74 03/20/2017 05:00 AM     Active Orders   Diet    DIET RENAL Regular; Consistent Carb 1800kcal        Thank you. Mary Ellen Aden. ARYA CamejoN, MPH  Diabetes [address] [place] [address]  [phone]    -For most hospitalized persons with hyperglycemia in the intensive care unit (ICU), a glucose range of 140 to 180 mg/dL is recommended, provided this target can be safely achieved. *  - For general medicine and surgery patients in non-ICU settings, a premeal glucose target <140 mg/dL and a random blood glucose <180 mg/dL are recommended. *    EMILY Rojas, Luis A Deng., Daisy Newell., ... & Michelle Morris ([id number]).  AMERICAN ASSOCIATION OF CLINICAL ENDOCRINOLOGISTS AND AMERICAN COLLEGE OF ENDOCRINOLOGY-CLINICAL PRACTICE GUIDELINES FOR DEVELOPING A DIABETES MELLITUS COMPREHENSIVE CARE PLAN-2015-EXECUTIVE SUMMARY: Complete guidelines are available at https://www. aace. com/publications/guidelines. Endocrine Practice, 21(4), [id number].

## 2017-03-20 NOTE — ACP (ADVANCE CARE PLANNING)
Request by patient, through in basket order, to assist with advance medical directive. Consulted with patients nurse. Explained document to patient, who was present in the room. Assisted patient in completing the document. Made copy for patients chart and handed it to unit secretary. Returned original document to the patient.      20[address] Dang Huerta M.Div, M.S, Mill[place] 603 available at [phone]

## 2017-03-20 NOTE — DIALYSIS
[facility]                         [phone]  Vitals Pre Post Assessment Pre Post   /57 129/61 LOC A+OX3 A+OX3   HR 69 70 Lungs CLEAR CLEAR   Temp 98.0 97.5 Cardiac REGULAR REGULAR   Resp 20 18 Skin WARM AND DRY  LEFT FOOT DRESSING INTACT WARM AND DRY  LEFT FOOT DRESSING CLEAN DRY AND INTACT   Weight 91.6KG 88.9KG Edema TRACE EDEMA BLE +1 PITTING TRACE EDEMA REMAINS      Pain DENIED DENIED     Orders   Duration: Start: 1200 End: 1545 Total: 3HRS 45 MINS   Dialyzer: REVACLEAR   K Bath: 2   Ca Bath: 2.5   Na / Bicarb: 137/37   Target Fluid Removal: 3000 ML     Access   Type & Location: LEFT UPPER ARM AV FISTULA   Comments:                            +BRUIT/THRILL NO S/S OF INFECTION CANNULATED WITH 15G 1 INCH NEEDLES X 2   +FLASH/ASP/NS FLUSH ART/VENOUS     Labs   Hep B status / date: NEG IMMUNE 01/09/17   Obtained/Reviewed  Critical Results Called REVIEWED       Meds Given   Name Dose Route   NONE                 Total Liters Process: 75 L   Net Fluid Removed: 2500 ML      Comments   Time Out Done: 8289   Primary Nurse Rpt Pre: Cristobal Clement   Primary Nurse Rpt Post: Cristobal Clement   Pt Education: MARY LOU/ Anthony Nettles 93: CONTINUE HEMODIALYSIS AS ORDERED BY NEPHROLOGIST   Tx Summary: 30 MINS INTO TX MINOR BLOOD LEAK ALARM ON MACHINE, RETURNED BLOOD TO PATIENT WITH 300ML NS, CIRCUIT CHANGED,TREATMENT RESTARTED    UF GOAL DECREASED DUE TO HYPOTENSION    HEMODIALYSIS ENDED 1545, ALL POSSIBLE BLOOD RETURNED IN CIRCUIT WITH 300ML NS, ART/VENOUS NEEDLES REMOVED PRESSURE HELD BLEEDING <8 MIN ART/VENOUS, LEFT UPPER ARM AV FISTULA +BRUIT/THRILL NO S/S OF INFECTION GAUZE AND TAPE APPLIED, REMAINS IN ROOM 402, CALL BELL WITHIN REACH, BED IN LOWEST POSITION, AWAITING DISCHARGE

## 2017-03-20 NOTE — PROGRESS NOTES
Spoke with Dr. Zachary Ortiz, who has reviewed patient's outpatient culture results. He recommends sending patient home on Augmentin 500mg QD x10 days. Dr. Claus Lott agrees with plan and will see patient back at the end of this week/early next week. Patient ok to be discharged home once dialysis is completed.     Kary Cuenca NP

## 2017-03-20 NOTE — PROGRESS NOTES
Spiritual Care Assessment/Progress Notes    Jatin Randle [id number]  [id number]    1955  59 y.o.  male    Patient Telephone Number: There is no home phone number on file. Anglican Affiliation: Muslim   Language: English   Extended Emergency Contact Information  Primary Emergency Contact: [address] [place] [address] Phone: [phone]  Mobile Phone: [phone]  Relation: Sister   Patient Active Problem List    Diagnosis Date Noted    Abscess of left foot 03/17/2017    Osteomyelitis, chronic, ankle or foot ([facility] 06/08/2016    HTN (hypertension) 06/08/2016    DM type 2 causing renal disease ([facility] 06/08/2016    ESRD on dialysis ([facility] 06/08/2016    CAD (coronary artery disease) 06/08/2016    History of CVA (cerebrovascular accident) 06/08/2016    Hyperkalemia 06/08/2016        Date: 3/20/2017       Level of Anglican/Spiritual Activity:  [x]         Involved in pauly tradition/spiritual practice    []         Not involved in pauly tradition/spiritual practice  [x]         Spiritually oriented    []         Claims no spiritual orientation    []         seeking spiritual identity  []         Feels alienated from Jewish practice/tradition  []         Feels angry about Jewish practice/tradition  [x]         Spirituality/Jewish tradition is a resource for coping at this time.   []         Not able to assess due to medical condition    Services Provided Today:  []         crisis intervention    []         reading Scriptures  []         spiritual assessment    []         prayer  []         empathic listening/emotional support  []         rites and rituals (cite in comments)  []         life review     []         Jewish support  []         theological development   []         advocacy  []         ethical dialog     []         blessing  []         bereavement support    []         support to family  []         anticipatory grief support   [x]         help with AMD  [] spiritual guidance    []         meditation      Spiritual Care Needs  []         Emotional Support  []         Spiritual/Episcopal Care  []         Loss/Adjustment  []         Advocacy/Referral                /Ethics  [x]         No needs expressed at               this time  []         Other: (note in               comments)  [address]  []         Follow up visits with               pt/family  []         Provide materials  []         Schedule sacraments  []         Contact Community               Clergy  [x]         Follow up as needed  []         Other: (note in               comments)     Comments: Request by patient, through in basket order, to assist with advance medical directive. Consulted with patients nurse. Explained document to patient, who was present in the room. Assisted patient in completing the document. Made copy for patients chart and handed it to unit secretary. Returned original document to the patient.      [address] Dang Huerta M.Div, M.S, Millicent Torre6 available at [phone])

## 2017-03-20 NOTE — PROGRESS NOTES
Bedside and Verbal shift change report given to Kane Fontana RN (oncoming nurse) by Marya Ng RN (offgoing nurse). Report included the following information SBAR, Kardex, Procedure Summary, Intake/Output, MAR, Accordion and Recent Results.

## 2017-03-20 NOTE — PROGRESS NOTES
3/20/2017 5:31 PM HH order received and sent to [facility] via [employer]. [facility] was notified pt will discharge home today. 3/20/2017  10:44 AM Met with pt. Charted address and phone number confirmed. Pt lives alone in a 1 story home in [place], there are 2 steps to enter. Pt has had [facility] in the past through [facility]. Pt owns a RW, cane and wheelchair. Pt has been to [address] in the past. Pt goes to [facility] in [facility]. Pt drives himself to dialysis. Pt has rx coverage. CM will follow for discharge needs.   SHERRY Griffin

## 2017-03-20 NOTE — DISCHARGE INSTRUCTIONS
Foot and Ankle Surgery Postop Care  Dr. Becki Rob      Most importantly, go home and rest.    Elevate your foot to heart level as much as possible. You may use ice to help with the pain. Note: Frozen peas work fine :)    Loosen and rewrap ace bandage in 4 to 5 hours if it feels too tight. Do NOT bear weight on your foot unless specifically allowed to do so. (Touch down for balance is okay.)    You may bathe, but you MUST keep the dressing dry. Important signs and symptoms:      If any of the following signs and symptoms occurs, you should contact Dr. Chauhan [facility] office. Please be advised if a problem arises which you feel requires immediate medical attention or you are unable to contact  Tien [facility] office you should seek immediate medical attention. Signs and symptoms to watch for include:    1. A sudden increase in swelling and /or redness or warmth at the area your surgery was performed which isnt relieved by rest, ice and elevation. 2.  Oral temperature greater than 101 degrees for 12 hours or more which isnt relieved by an increase in fluid intake and taking two Tylenol every 4-6 hours. Do not exceed 4000mg of Tylenol per day. 3.  Excessive drainage from your incisions or drainage that hasnt stopped by 72 hours. 4.  Calf pain, tenderness, redness or swelling which isnt relieved with rest and elevation. 5.  Fever, chills, nausea, vomiting or other signs and symptoms which are of concern to you. 6.  If sudden shortness of breath or chest pain occur, go to the ER or call an ambulance immediately! Call Dr. Radha Olguin on your way to the hospital or after you arrive. Follow up:    Call our office at [phone] to make an appointment for the end of this week or beginning of next week. Call our office at [phone] ext [phone] at any time should any complications occur, especially if you develop a fever above 101° F.       Dr. Susan Ochoa Gautam Domínguez MD & Team

## 2017-03-20 NOTE — PROGRESS NOTES
1000.  Call to [facility] to arrange dialysis time- No orders for dialysis. Dr. Ellen Danielle paged. 1040. Dr. Ellen Danielle paged regarding dialysis orders. 1044. Spoke to Dr. Horace Rome- will call office for orders for dialysis. 1930. Bedside and Verbal shift change report given to Erika Ferguson RN (oncoming nurse) by August Armenta RN (offgoing nurse). Report included the following information SBAR, Kardex, Intake/Output and MAR.

## 2017-03-20 NOTE — PROGRESS NOTES
Daphne  YOB: 1955          Assessment & Plan:   ESRD  · [facility] HD well  · 3 hr 45 min Qt, 2/2.5 bath. UF 3 kg. Subjective:   CC: ESRD mgmt  HPI: Patient seen and examined on HD. Tolerating well. Current Facility-Administered Medications   Medication Dose Route Frequency    insulin glargine (LANTUS) injection 20 Units  20 Units SubCUTAneous QHS    insulin lispro (HUMALOG) injection   SubCUTAneous AC&HS    dextrose (D50W) injection syrg 12.5-25 g  12.5-25 g IntraVENous PRN    vancomycin (VANCOCIN) 500 mg in 0.9% sodium chloride (MBP/ADV) 100 mL  500 mg IntraVENous DIALYSIS MON, WED & FRI    Vancomycin 500 mg IVPB after each HD session   Other Rx Dosing/Monitoring    piperacillin-tazobactam (ZOSYN) 3.375 g in 0.9% sodium chloride (MBP/ADV) 100 mL  3.375 g IntraVENous Q12H    aspirin delayed-release tablet 81 mg  81 mg Oral DAILY    docusate sodium (COLACE) capsule 100 mg  100 mg Oral BID    isosorbide mononitrate ER (IMDUR) tablet 60 mg  60 mg Oral DAILY    latanoprost (XALATAN) 0.005 % ophthalmic solution 1 Drop  1 Drop Both Eyes QHS    levobunolol (BETAGAN) 0.5 % ophthalmic solution 1 Drop  1 Drop Both Eyes DAILY    simvastatin (ZOCOR) tablet 40 mg  40 mg Oral QHS    tamsulosin (FLOMAX) capsule 0.4 mg  0.4 mg Oral QHS    traZODone (DESYREL) tablet 150 mg  150 mg Oral QHS    HYDROmorphone (PF) (DILAUDID) injection 0.5 mg  0.5 mg IntraVENous Q4H PRN    oxyCODONE-acetaminophen (PERCOCET) 5-325 mg per tablet 1 Tab  1 Tab Oral Q4H PRN    glucose chewable tablet 16 g  4 Tab Oral PRN    glucagon (GLUCAGEN) injection 1 mg  1 mg IntraMUSCular PRN          Objective:     Vitals:  Blood pressure 118/52, pulse 68, temperature 98 °F (36.7 °C), resp. rate 21, height 6' 1\" (1.854 m), weight 91.6 kg (202 lb), SpO2 97 %.   Temp (24hrs), Av °F (36.7 °C), Min:98 °F (36.7 °C), Max:98.1 °F (36.7 °C)      Intake and Output:          Physical Exam:               GENERAL ASSESSMENT: NAD  EXTREMITY: no EDEMA  NEURO:Grossly non focal          ECG/rhythm:    Data Review      No results for input(s): TNIPOC in the last 72 hours. No lab exists for component: ITNL   No results for input(s): CPK, CKMB, TROIQ in the last 72 hours. Recent Labs      03/20/17   0500  03/18/17   0209   NA  137  138   K  4.9  4.2   CL  96*  97   CO2  24  29   BUN  97*  48*   CREA  11.74*  6.67*   GLU  139*  108*   MG  2.1   --    CA  7.5*  8.6   ALB   --   3.3*   WBC  5.4  7.1   HGB  9.7*  11.3*   HCT  30.3*  33.7*   PLT  230  211      No results for input(s): INR, PTP, APTT in the last 72 hours. No lab exists for component: INREXT  Needs: urine analysis, urine sodium, protein and creatinine  No results found for: NICOLETTE MEJIA        : Tuan Gaytan MD  3/20/2017        [facility]:  [email or web address]  Roseline Stover office:  [address], [address] [place] [address],[address]  [place], [address] [facility]  Phone: [phone]  Fax :     [phone]    [facility] office:  [address] [facility]  Edgard Vega  Phone - [phone]  Fax - [phone]

## 2017-03-20 NOTE — PROGRESS NOTES
Received a script for antibiotic and a copy of discharge instructions. Instructions and script read and explained to him.  Verbalized understanding

## 2017-03-20 NOTE — PROGRESS NOTES
Chart reviewed. Home med rec completed for discharge. Antibiotics to be determined by ID. ? Augmentin. Will sign off.  Please page with questions or concerns

## 2017-03-20 NOTE — PROGRESS NOTES
Problem: Mobility Impaired (Adult and Pediatric)  Goal: *Acute Goals and Plan of Care (Insert Text)  PHYSICAL THERAPY EVALUATION/DISCHARGE  Patient: Gerardo Aden (89 y.o. male)  Date: 3/20/2017  Primary Diagnosis: ABSCESS LEFT FOOT  Infection  Procedure(s) (LRB):  INCISION AND DRAINAGE LEFT FOOT   (Left) 3 Days Post-Op   Precautions:   Fall (L HP with hx of CVA)  ASSESSMENT :  Based on the objective data described below, the patient presents with admission due to abscess of L foot with I&D. Pt with hx of CVA from 2011 with residual mild to mod L HP with noted foot drop. Pt stating that he does not use the AFO he has at home. Pt fitted with heel wt bearing ant off loading shoe, yet difficulty controlling due to foot drop. Used [facility] PTA, yet needing RW for now due to new wt bearing shoe and balance compromise. Pt stating that he is going to sister's house tonight upon discharge since he lives alone. Pt received supine in bed just finishing HD. VSS allowing supine to sit with Supervision. Sit to stand with Min Ax2. Gait of 22' with RW and Mod to Min A. Recommend for family to walk with this pt at all times. Managed stairs of 4 with R rail and Mod to Min A x1. HHPT needed to progress pt gait/balance/activity tolerance and strength. Marybeth Kolb PLAN :  Discharge Recommendations: Home Health  Further Equipment Recommendations for Discharge:has rolling walker       SUBJECTIVE:   Patient stated Lindsaymanuel Monsalve will use my RW for now.       OBJECTIVE DATA SUMMARY:   HISTORY:    Past Medical History:   Diagnosis Date    CAD (coronary artery disease)        2014, MI, CABG    Chronic kidney disease       on dialysis MWF    Diabetes ([facility]      ESRD (end stage renal disease) ([facility]      Glaucoma      Heart failure ([facility]       chronic diastolic HF per cardiology note    Hyperlipidemia      Hypertension      Ill-defined condition       overweight BMI 27.2    PAD (peripheral artery disease) ([facility]      Stroke [facility]) August 2011 residual left side weakness stroke x 2 per cardiology note     Past Surgical History:   Procedure Laterality Date    CARDIAC SURG PROCEDURE UNLIST         cavbg 2014    HX CORONARY ARTERY BYPASS GRAFT   February 2014    HX HEART CATHETERIZATION   01/24/2014    HX OTHER SURGICAL Left 06/2016     last toe on left foot amputated, for nonhealing toe ulcer    HX VASCULAR ACCESS Right Brenden Catheter     removed when fistula healed    VASCULAR SURGERY PROCEDURE UNLIST         fistula left arm     Prior Level of Function/Home Situation: lives alone; used [facility] PTA  Personal factors and/or comorbidities impacting plan of care: ESRD on HD; L ft infection s/p I&D     Home Situation  Home Environment: Private residence  # Steps to Enter: 2  Rails to Enter: Yes  Hand Rails : Right  One/Two Story Residence: Two story, live on 1st floor  Lift Chair Available: Yes (but going to [facility])  Living Alone: No  Support Systems: Family member(s)  Patient Expects to be Discharged to[de-identified] Private residence  Current DME Used/Available at Home: Grab bars, Raised toilet seat, Cane, quad, Wheelchair, Walker, rolling  Tub or Shower Type: Tub/Shower combination     EXAMINATION/PRESENTATION/DECISION MAKING:   Critical Behavior:  Neurologic State: Alert  Orientation Level: Oriented X4  Cognition: Follows commands  Safety/Judgement: Fall prevention, Insight into deficits  Hearing:   Auditory  Auditory Impairment: None  Skin:  Dressing L ft; HD shunt L UE  Edema: none  Range Of Motion:  AROM: Generally decreased, functional (mild L HP)                       Strength:    Strength: Generally decreased, functional (mild L HP)                    Tone & Sensation:   Tone: Normal              Sensation: Intact               Coordination:  Coordination: Generally decreased, functional  Vision:      Functional Mobility:  Bed Mobility:  Rolling: Supervision  Supine to Sit: Supervision  Sit to Supine: Supervision  Scooting: Independent  Transfers:  Sit to Stand: Minimum assistance;Assist x2  Stand to Sit: Minimum assistance                       Balance:   Sitting: Intact  Standing: Impaired; With support  Standing - Static: Fair;Constant support  Standing - Dynamic : Poor  Ambulation/Gait Training:  Distance (ft): 25 Feet (ft)  Assistive Device: Walker, rolling;Gait belt;Brace/Splint (R ant off loading shoe)  Ambulation - Level of Assistance: Moderate assistance;Minimal assistance        Gait Abnormalities: Antalgic;Decreased step clearance; Foot drop        Base of Support: Narrowed; Center of gravity altered;Shift to right     Speed/Kalina: Slow;Shuffled;Pace decreased (<100 feet/min)  Step Length: Right shortened;Left shortened                                                          Stairs:  Number of Stairs Trained: 4  Stairs - Level of Assistance: Moderate assistance;Minimum assistance                                     Functional Measure:  Barthel Index:      Bathin  Bladder: 5  Bowels: 5  Groomin  Dressin  Feeding: 10  Mobility: 0  Stairs: 5  Toilet Use: 5  Transfer (Bed to Chair and Back): 10  Total: 50         Barthel and G-code impairment scale:  Percentage of impairment CH  0% CI  1-19% CJ  20-39% CK  40-59% CL  60-79% CM  80-99% CN  100%   Barthel Score 0-100 100 99-80 79-60 59-40 20-39 1-19    0   Barthel Score 0-20 20 17-19 13-16 9-12 5-8 1-4 0      The Barthel ADL Index: Guidelines  1. The index should be used as a record of what a patient does, not as a record of what a patient could do. 2. The main aim is to establish degree of independence from any help, physical or verbal, however minor and for whatever reason. 3. The need for supervision renders the patient not independent. 4. A patient's performance should be established using the best available evidence. Asking the patient, friends/relatives and nurses are the usual sources, but direct observation and common sense are also important.  However direct testing is not needed. 5. Usually the patient's performance over the preceding 24-48 hours is important, but occasionally longer periods will be relevant. 6. Middle categories imply that the patient supplies over 50 per cent of the effort. 7. Use of aids to be independent is allowed. Jose Roberto Downs., Barthel, D.W. (7739). Functional evaluation: the Barthel Index. [address] [facility] (14)2. Laura Gar juan luis DESTINI Valadez, Alicia Mackenzie, Mike Erazo., [place], [address] [place] [address] (1999). Measuring the change indisability after inpatient rehabilitation; comparison of the responsiveness of the Barthel Index and Functional [place] Measure. Journal of Neurology, Neurosurgery, and Psychiatry, 66(4), 881-527. RANJANA Covington, MADDI Lu, & Shikha Rg MBASHIR. (2004.) Assessment of post-stroke quality of life in cost-effectiveness studies: The usefulness of the Barthel Index and the EuroQoL-5D. Quality of Life Research, 13, 209-02         G codes: In compliance with CMSs Claims Based Outcome Reporting, the following G-code set was chosen for this patient based on their primary functional limitation being treated: The outcome measure chosen to determine the severity of the functional limitation was the barthel with a score of 50/100 which was correlated with the impairment scale.       · Mobility - Walking and Moving Around:               - CURRENT STATUS:    CK - 40%-59% impaired, limited or restricted               - GOAL STATUS:           CK - 40%-59% impaired, limited or restricted               - D/C STATUS:                       CK - 40%-59% impaired, limited or restricted         Physical Therapy Evaluation Charge Determination   History Examination Presentation Decision-Making   HIGH Complexity :3+ comorbidities / personal factors will impact the outcome/ POC  MEDIUM Complexity : 3 Standardized tests and measures addressing body structure, function, activity limitation and / or participation in recreation  MEDIUM Complexity : Evolving with changing characteristics  Other outcome measures barthel  MEDIUM      Based on the above components, the patient evaluation is determined to be of the following complexity level: MEDIUM     Pain:  Pain Scale 1: Numeric (0 - 10)  Pain Intensity 1: 0     Activity Tolerance:   fair  Please refer to the flowsheet for vital signs taken during this treatment. After treatment:   [X]   Patient left in no apparent distress sitting up in chair  [ ]   Patient left in no apparent distress in bed  [X]   Call bell left within reach  [X]   Nursing notified  [ ]   Caregiver present  [ ]   Bed alarm activated      COMMUNICATION/EDUCATION:   Communication/Collaboration:  [X]   Fall prevention education was provided and the patient/caregiver indicated understanding. [ ]   Patient/family have participated as able and agree with findings and recommendations. [ ]   Patient is unable to participate in plan of care at this time.   Findings and recommendations were discussed with: Registered Nurse     Thank you for this referral.  Peg Dancer, PT   Time Calculation: 34 mins

## 2017-03-21 NOTE — PROGRESS NOTES
Assumed care of pt from Belem Villalta RN. Pt has received discharge instructions and scripts. Pt resting in chair waiting for ride, VSS, no c/o pain or discomfort.

## 2017-03-22 NOTE — DISCHARGE SUMMARY
Physician Discharge Summary     Patient ID:    Richy Jolly  [id number]  72 y.o.  1955    Admit date: 3/17/2017    Discharge date and time: 3/22/2017    Admission Diagnoses: ABSCESS LEFT FOOT  Infection    Chronic Diagnoses:    Problem List as of 3/20/2017  Date Reviewed: 3/17/2017          Codes Class Noted - Resolved    * (Principal)Abscess of left foot ICD-10-CM: L02.612  ICD-9-CM: 682.7  3/17/2017 - Present        Osteomyelitis, chronic, ankle or foot ([facility] ICD-10-CM: M86.679  ICD-9-CM: 730.17  6/8/2016 - Present        HTN (hypertension) (Chronic) ICD-10-CM: I10  ICD-9-CM: 401.9  6/8/2016 - Present        DM type 2 causing renal disease ([facility] (Chronic) ICD-10-CM: E11.29  ICD-9-CM: 250.40  6/8/2016 - Present        ESRD on dialysis [facility]) (Chronic) ICD-10-CM: N18.6, Z99.2  ICD-9-CM: 585.6, V45.11  6/8/2016 - Present        CAD (coronary artery disease) (Chronic) ICD-10-CM: I25.10  ICD-9-CM: 414.00  6/8/2016 - Present        History of CVA (cerebrovascular accident) (Chronic) ICD-10-CM: O58.10  ICD-9-CM: V12.54  6/8/2016 - Present        Hyperkalemia ICD-10-CM: E87.5  ICD-9-CM: 276.7  6/8/2016 - Present              Discharge Medications: Cannot display discharge medications since this patient is not currently admitted. Follow up Care:    1. Sukhdev Raines MD in 1-2 weeks  2. Flakita Willams    Diet:  Diabetic Diet    Disposition:  Home. Advanced Directive:    Discharge Exam:  See today's note. CONSULTATIONS: ID and Cardiac Surgery    Significant Diagnostic Studies:   Recent Labs      03/20/17   0500   WBC  5.4   HGB  9.7*   HCT  30.3*   PLT  230     Recent Labs      03/20/17   0500   NA  137   K  4.9   CL  96*   CO2  24   BUN  97*   CREA  11.74*   GLU  139*   CA  7.5*   MG  2.1     No results for input(s): SGOT, GPT, AP, TBIL, TP, ALB, GLOB, GGT, AML, LPSE in the last 72 hours. No lab exists for component: AMYP, HLPSE  No results for input(s): INR, PTP, APTT in the last 72 hours.     No lab exists for component: INREXT   No results for input(s): FE, TIBC, PSAT, FERR in the last 72 hours. No results for input(s): PH, PCO2, PO2 in the last 72 hours. No results for input(s): CPK, CKMB in the last 72 hours. No lab exists for component: TROPONINI  No components found for: [address] [place] [address] Street:   1. I and D abscess left foot on day of admission, infection and antibiotic coverage by ID.       Signed:  Jalne Rodney MD  3/22/2017  2:31 PM

## 2020-03-05 ENCOUNTER — HOSPITAL ENCOUNTER (OUTPATIENT)
Age: 65
Setting detail: OUTPATIENT SURGERY
Discharge: NURSING, REHAB OR LONG-TERM CARE | End: 2020-03-05
Payer: MEDICARE

## 2020-03-05 ENCOUNTER — ANESTHESIA (OUTPATIENT)
Dept: SURGERY | Age: 65
End: 2020-03-05
Payer: MEDICARE

## 2020-03-05 ENCOUNTER — ANESTHESIA EVENT (OUTPATIENT)
Dept: SURGERY | Age: 65
End: 2020-03-05
Payer: MEDICARE

## 2020-03-05 VITALS
HEIGHT: 73 IN | DIASTOLIC BLOOD PRESSURE: 45 MMHG | WEIGHT: 204.15 LBS | OXYGEN SATURATION: 99 % | RESPIRATION RATE: 16 BRPM | HEART RATE: 87 BPM | SYSTOLIC BLOOD PRESSURE: 139 MMHG | BODY MASS INDEX: 27.06 KG/M2 | TEMPERATURE: 97.6 F

## 2020-03-05 DIAGNOSIS — M86.672 CHRONIC OSTEOMYELITIS INVOLVING LEFT ANKLE AND FOOT (HCC): Primary | ICD-10-CM

## 2020-03-05 LAB
GLUCOSE BLD STRIP.AUTO-MCNC: 287 MG/DL (ref 65–100)
GLUCOSE BLD STRIP.AUTO-MCNC: 321 MG/DL (ref 65–100)
SERVICE CMNT-IMP: ABNORMAL
SERVICE CMNT-IMP: ABNORMAL

## 2020-03-05 PROCEDURE — 74011250636 HC RX REV CODE- 250/636: Performed by: NURSE ANESTHETIST, CERTIFIED REGISTERED

## 2020-03-05 PROCEDURE — 82962 GLUCOSE BLOOD TEST: CPT

## 2020-03-05 PROCEDURE — 77030002916 HC SUT ETHLN J&J -A

## 2020-03-05 PROCEDURE — 77030018836 HC SOL IRR NACL ICUM -A

## 2020-03-05 PROCEDURE — 77030036687 HC SHOE PSTOP S2SG -A

## 2020-03-05 PROCEDURE — 74011000250 HC RX REV CODE- 250

## 2020-03-05 PROCEDURE — 74011000250 HC RX REV CODE- 250: Performed by: NURSE ANESTHETIST, CERTIFIED REGISTERED

## 2020-03-05 PROCEDURE — 88311 DECALCIFY TISSUE: CPT

## 2020-03-05 PROCEDURE — 76060000032 HC ANESTHESIA 0.5 TO 1 HR

## 2020-03-05 PROCEDURE — 74011636637 HC RX REV CODE- 636/637: Performed by: ANESTHESIOLOGY

## 2020-03-05 PROCEDURE — 77030011640 HC PAD GRND REM COVD -A

## 2020-03-05 PROCEDURE — 74011250636 HC RX REV CODE- 250/636: Performed by: ANESTHESIOLOGY

## 2020-03-05 PROCEDURE — 88305 TISSUE EXAM BY PATHOLOGIST: CPT

## 2020-03-05 PROCEDURE — 76010000138 HC OR TIME 0.5 TO 1 HR

## 2020-03-05 PROCEDURE — 74011000258 HC RX REV CODE- 258: Performed by: NURSE ANESTHETIST, CERTIFIED REGISTERED

## 2020-03-05 PROCEDURE — 76210000006 HC OR PH I REC 0.5 TO 1 HR

## 2020-03-05 PROCEDURE — 76210000021 HC REC RM PH II 0.5 TO 1 HR

## 2020-03-05 RX ORDER — SODIUM CHLORIDE 9 MG/ML
INJECTION, SOLUTION INTRAVENOUS
Status: DISCONTINUED | OUTPATIENT
Start: 2020-03-05 | End: 2020-03-05 | Stop reason: HOSPADM

## 2020-03-05 RX ORDER — DIPHENHYDRAMINE HYDROCHLORIDE 50 MG/ML
12.5 INJECTION, SOLUTION INTRAMUSCULAR; INTRAVENOUS AS NEEDED
Status: DISCONTINUED | OUTPATIENT
Start: 2020-03-05 | End: 2020-03-05 | Stop reason: HOSPADM

## 2020-03-05 RX ORDER — HYDROMORPHONE HYDROCHLORIDE 1 MG/ML
0.2 INJECTION, SOLUTION INTRAMUSCULAR; INTRAVENOUS; SUBCUTANEOUS
Status: DISCONTINUED | OUTPATIENT
Start: 2020-03-05 | End: 2020-03-05 | Stop reason: HOSPADM

## 2020-03-05 RX ORDER — EPHEDRINE SULFATE/0.9% NACL/PF 50 MG/5 ML
5 SYRINGE (ML) INTRAVENOUS AS NEEDED
Status: DISCONTINUED | OUTPATIENT
Start: 2020-03-05 | End: 2020-03-05 | Stop reason: HOSPADM

## 2020-03-05 RX ORDER — LIDOCAINE HYDROCHLORIDE 10 MG/ML
INJECTION INFILTRATION; PERINEURAL AS NEEDED
Status: DISCONTINUED | OUTPATIENT
Start: 2020-03-05 | End: 2020-03-05 | Stop reason: HOSPADM

## 2020-03-05 RX ORDER — PROPOFOL 10 MG/ML
INJECTION, EMULSION INTRAVENOUS AS NEEDED
Status: DISCONTINUED | OUTPATIENT
Start: 2020-03-05 | End: 2020-03-05 | Stop reason: HOSPADM

## 2020-03-05 RX ORDER — HYDROCODONE BITARTRATE AND ACETAMINOPHEN 7.5; 325 MG/1; MG/1
1 TABLET ORAL
Qty: 15 TAB | Refills: 0 | Status: SHIPPED | OUTPATIENT
Start: 2020-03-05 | End: 2020-03-08

## 2020-03-05 RX ORDER — CEFAZOLIN SODIUM/WATER 2 G/20 ML
2 SYRINGE (ML) INTRAVENOUS
Status: DISCONTINUED | OUTPATIENT
Start: 2020-03-05 | End: 2020-03-05 | Stop reason: HOSPADM

## 2020-03-05 RX ORDER — MORPHINE SULFATE 10 MG/ML
2 INJECTION, SOLUTION INTRAMUSCULAR; INTRAVENOUS
Status: DISCONTINUED | OUTPATIENT
Start: 2020-03-05 | End: 2020-03-05 | Stop reason: HOSPADM

## 2020-03-05 RX ORDER — FENTANYL CITRATE 50 UG/ML
25 INJECTION, SOLUTION INTRAMUSCULAR; INTRAVENOUS
Status: DISCONTINUED | OUTPATIENT
Start: 2020-03-05 | End: 2020-03-05 | Stop reason: HOSPADM

## 2020-03-05 RX ORDER — LIDOCAINE HYDROCHLORIDE 20 MG/ML
INJECTION, SOLUTION EPIDURAL; INFILTRATION; INTRACAUDAL; PERINEURAL AS NEEDED
Status: DISCONTINUED | OUTPATIENT
Start: 2020-03-05 | End: 2020-03-05 | Stop reason: HOSPADM

## 2020-03-05 RX ORDER — FENTANYL CITRATE 50 UG/ML
50 INJECTION, SOLUTION INTRAMUSCULAR; INTRAVENOUS AS NEEDED
Status: DISCONTINUED | OUTPATIENT
Start: 2020-03-05 | End: 2020-03-05 | Stop reason: HOSPADM

## 2020-03-05 RX ORDER — SODIUM CHLORIDE, SODIUM LACTATE, POTASSIUM CHLORIDE, CALCIUM CHLORIDE 600; 310; 30; 20 MG/100ML; MG/100ML; MG/100ML; MG/100ML
100 INJECTION, SOLUTION INTRAVENOUS CONTINUOUS
Status: DISCONTINUED | OUTPATIENT
Start: 2020-03-05 | End: 2020-03-05 | Stop reason: HOSPADM

## 2020-03-05 RX ORDER — SODIUM CHLORIDE 9 MG/ML
25 INJECTION, SOLUTION INTRAVENOUS CONTINUOUS
Status: DISCONTINUED | OUTPATIENT
Start: 2020-03-05 | End: 2020-03-05 | Stop reason: HOSPADM

## 2020-03-05 RX ORDER — SODIUM CHLORIDE, SODIUM LACTATE, POTASSIUM CHLORIDE, CALCIUM CHLORIDE 600; 310; 30; 20 MG/100ML; MG/100ML; MG/100ML; MG/100ML
1000 INJECTION, SOLUTION INTRAVENOUS CONTINUOUS
Status: DISCONTINUED | OUTPATIENT
Start: 2020-03-05 | End: 2020-03-05 | Stop reason: HOSPADM

## 2020-03-05 RX ORDER — LIDOCAINE HYDROCHLORIDE 10 MG/ML
0.1 INJECTION, SOLUTION EPIDURAL; INFILTRATION; INTRACAUDAL; PERINEURAL AS NEEDED
Status: DISCONTINUED | OUTPATIENT
Start: 2020-03-05 | End: 2020-03-05 | Stop reason: HOSPADM

## 2020-03-05 RX ORDER — MIDAZOLAM HYDROCHLORIDE 1 MG/ML
0.5 INJECTION, SOLUTION INTRAMUSCULAR; INTRAVENOUS
Status: DISCONTINUED | OUTPATIENT
Start: 2020-03-05 | End: 2020-03-05 | Stop reason: HOSPADM

## 2020-03-05 RX ORDER — MIDAZOLAM HYDROCHLORIDE 1 MG/ML
1 INJECTION, SOLUTION INTRAMUSCULAR; INTRAVENOUS AS NEEDED
Status: DISCONTINUED | OUTPATIENT
Start: 2020-03-05 | End: 2020-03-05 | Stop reason: HOSPADM

## 2020-03-05 RX ORDER — OXYCODONE HYDROCHLORIDE 5 MG/1
5 TABLET ORAL AS NEEDED
Status: DISCONTINUED | OUTPATIENT
Start: 2020-03-05 | End: 2020-03-05 | Stop reason: HOSPADM

## 2020-03-05 RX ORDER — ROPIVACAINE HYDROCHLORIDE 5 MG/ML
150 INJECTION, SOLUTION EPIDURAL; INFILTRATION; PERINEURAL AS NEEDED
Status: DISCONTINUED | OUTPATIENT
Start: 2020-03-05 | End: 2020-03-05 | Stop reason: HOSPADM

## 2020-03-05 RX ORDER — ACETAMINOPHEN 325 MG/1
650 TABLET ORAL ONCE
Status: DISCONTINUED | OUTPATIENT
Start: 2020-03-05 | End: 2020-03-05 | Stop reason: HOSPADM

## 2020-03-05 RX ORDER — ONDANSETRON 2 MG/ML
4 INJECTION INTRAMUSCULAR; INTRAVENOUS AS NEEDED
Status: DISCONTINUED | OUTPATIENT
Start: 2020-03-05 | End: 2020-03-05 | Stop reason: HOSPADM

## 2020-03-05 RX ORDER — CLOPIDOGREL BISULFATE 75 MG/1
75 TABLET ORAL
COMMUNITY

## 2020-03-05 RX ADMIN — LIDOCAINE HYDROCHLORIDE 60 MG: 20 INJECTION, SOLUTION EPIDURAL; INFILTRATION; INTRACAUDAL; PERINEURAL at 13:13

## 2020-03-05 RX ADMIN — PROPOFOL 30 MG: 10 INJECTION, EMULSION INTRAVENOUS at 13:20

## 2020-03-05 RX ADMIN — SODIUM CHLORIDE: 9 INJECTION, SOLUTION INTRAVENOUS at 13:08

## 2020-03-05 RX ADMIN — PROPOFOL 30 MG: 10 INJECTION, EMULSION INTRAVENOUS at 13:28

## 2020-03-05 RX ADMIN — HUMAN INSULIN 5 UNITS: 100 INJECTION, SOLUTION SUBCUTANEOUS at 14:16

## 2020-03-05 RX ADMIN — SODIUM CHLORIDE 25 ML/HR: 900 INJECTION, SOLUTION INTRAVENOUS at 11:43

## 2020-03-05 RX ADMIN — PROPOFOL 20 MG: 10 INJECTION, EMULSION INTRAVENOUS at 13:13

## 2020-03-05 NOTE — ANESTHESIA POSTPROCEDURE EVALUATION
Post-Anesthesia Evaluation and Assessment    Patient: Richy Jolly MRN: [id number]  SSN: [id number]    YOB: 1955  Age: 75 y.o. Sex: male      I have evaluated the patient and they are stable and ready for discharge from the PACU. Cardiovascular Function/Vital Signs  Visit Vitals  /50   Pulse 89   Temp 36.6 °C (97.8 °F)   Resp 19   Ht 6' 1\" (1.854 m)   Wt 92.6 kg (204 lb 2.3 oz)   SpO2 100%   BMI 26.93 kg/m²       Patient is status post MAC anesthesia for Procedure(s):  LEFT GREAT TOE AMPUTATION. Nausea/Vomiting: None    Postoperative hydration reviewed and adequate. Pain:  Pain Scale 1: Numeric (0 - 10) (03/05/20 1344)  Pain Intensity 1: 0 (03/05/20 1344)   Managed    Neurological Status:   Neuro (WDL): Within Defined Limits (03/05/20 1344)  Neuro  Neurologic State: Alert(states I didn't sleep at all) (03/05/20 1344)  LUE Motor Response: Purposeful (03/05/20 1344)  LLE Motor Response: Purposeful (03/05/20 1344)  RUE Motor Response: Purposeful (03/05/20 1344)  RLE Motor Response: Purposeful (03/05/20 1344)   At baseline    Mental Status, Level of Consciousness: Alert and  oriented to person, place, and time    Pulmonary Status:   O2 Device: CO2 nasal cannula (03/05/20 1346)   Adequate oxygenation and airway patent    Complications related to anesthesia: None    Post-anesthesia assessment completed. No concerns    Signed By: Yasir Lucas MD     March 5, 2020              Procedure(s):  LEFT GREAT TOE AMPUTATION. MAC    <BSHSIANPOST>    Vitals Value Taken Time   /51 3/5/2020  1:50 PM   Temp 36.6 °C (97.8 °F) 3/5/2020  1:46 PM   Pulse 90 3/5/2020  1:55 PM   Resp 20 3/5/2020  1:55 PM   SpO2 99 % 3/5/2020  1:55 PM   Vitals shown include unvalidated device data.

## 2020-03-05 NOTE — ROUTINE PROCESS
Patient: Phoenix Alvarenga MRN: [id number]  SSN: [id number]   YOB: 1955  Age: 75 y.o. Sex: male     Patient is status post Procedure(s):  LEFT GREAT TOE AMPUTATION. Surgeon(s) and Role:     Kadie Lockhart MD - Primary    Local/Dose/Irrigation: 10mls lidocaine plain injected locally left foot                  Peripheral IV 03/05/20 Right Antecubital (Active)   Site Assessment Clean, dry, & intact 3/5/2020 11:39 AM   Phlebitis Assessment 0 3/5/2020 11:39 AM   Infiltration Assessment 0 3/5/2020 11:39 AM   Dressing Status Clean, dry, & intact; New 3/5/2020 11:39 AM   Dressing Type Tape;Transparent 3/5/2020 11:39 AM   Hub Color/Line Status Pink; Infusing 3/5/2020 11:39 AM                           Dressing/Packing:  Wound Foot Left-Dressing Type: 4 x 4;ABD pad;Elastic bandage;Gauze;Non adherent (03/05/20 1329)    Splint/Cast    Other:

## 2020-03-05 NOTE — ANESTHESIA PREPROCEDURE EVALUATION
Relevant Problems   No relevant active problems       Anesthetic History   No history of anesthetic complications            Review of Systems / Medical History  Patient summary reviewed, nursing notes reviewed and pertinent labs reviewed    Pulmonary  Within defined limits                 Neuro/Psych       CVA       Cardiovascular    Hypertension      CHF    Past MI, CAD, PAD and CABG         GI/Hepatic/Renal         Renal disease: ESRD and dialysis       Endo/Other    Diabetes         Other Findings              Physical Exam    Airway  Mallampati: II  TM Distance: > 6 cm  Neck ROM: normal range of motion   Mouth opening: Normal     Cardiovascular  Regular rate and rhythm,  S1 and S2 normal,  no murmur, click, rub, or gallop             Dental  No notable dental hx       Pulmonary  Breath sounds clear to auscultation               Abdominal  GI exam deferred       Other Findings            Anesthetic Plan    ASA: 4  Anesthesia type: MAC            Anesthetic plan and risks discussed with: Patient

## 2020-03-05 NOTE — BRIEF OP NOTE
BRIEF OPERATIVE NOTE    Date of Procedure: 3/5/2020   Preoperative Diagnosis: ATHEROSCLEROSIS WITH ULCERATION  Postoperative Diagnosis: same    Procedure(s):  LEFT GREAT TOE AMPUTATION  Surgeon(s) and Role:     Ricky Wise MD - Primary         Surgical Assistant: none    Surgical Staff:  Circ-1: Joane Gottron, RN  Scrub Tech-1: Ramirez Gonsaelz  Event Time In Time Out   Incision Start 1318    Incision Close 1336      Anesthesia: MAC   Estimated Blood Loss: minimal  Specimens:   ID Type Source Tests Collected by Time Destination   1 : RIGHT GREAT TOE Fresh Toe  Mike Helm MD 3/5/2020 1323 Pathology      Findings: none   Complications: none  Implants: * No implants in log *

## 2020-03-05 NOTE — OP NOTES
[facility]  OPERATIVE REPORT    Name:  Kedar Palacio  MR#:  [id number]  :  1955  ACCOUNT #:  [de-identified]  DATE OF SERVICE:  2020    PREOPERATIVE DIAGNOSIS:  Nonhealing left great toe wound with exposed bone. POSTOPERATIVE DIAGNOSIS:  Nonhealing left great toe wound with exposed bone. PROCEDURE PERFORMED:  Left great toe amputation. SURGEON:  Mirza Levy MD    ASSISTANT:  None. ANESTHESIA:  Local with sedation. COMPLICATIONS:  None. SPECIMENS REMOVED:  Distal left great toe. IMPLANTS:  None. ESTIMATED BLOOD LOSS:  Minimal.    INDICATIONS:  The patient is a 66-year-old diabetic male with end-stage renal disease on hemodialysis. He has a 1 cm black eschar at the distal end of the left great toe. The eschar is beginning to separate and there is exposed bone beneath the eschar. He underwent angioplasty of the left anterior tibial artery earlier this week for revascularization and will now undergo toe amputation. PROCEDURE:  The patient's left foot was prepped and draped. 1% lidocaine was used for local anesthesia. A circumferential incision was made just proximal to the great toenail. The incision was continued circumferentially down to the distal interphalangeal joint. The toe was amputated through the DIP joint. The proximal phalanx was then debrided proximally with a rongeur. There was reasonable bleeding from the soft tissues. The incision was then closed with interrupted nylon sutures. Dressings were applied and the patient was returned to the recovery room in stable condition.         Rahel Killian MD      [id number]  D:  2020 13:48  T:  2020 16:58  JOB #:  [id number]

## 2020-03-05 NOTE — DISCHARGE INSTRUCTIONS
Patient Discharge Instructions    Romayne Anger / [id number] : 1955    Admitted 3/5/2020 Discharged: 3/5/2020     Take Home Medications     . · It is important that you take the medication exactly as they are prescribed. · Keep your medication in the bottles provided by the pharmacist and keep a list of the medication names, dosages, and times to be taken in your wallet. · Do not take other medications without consulting your doctor. What to do at Home    Recommended diet: Diabetic Diet, start with light bland foods; advance if no nausea or vomiting    Recommended activity: Activity as tolerated   NO THERAPY TO LEFT FOOT UNTIL OK WITH DR Marli Davis    Additional Instructions: change left foot dressing on Monday - redress with dry gauze, use post op shoe when up on foot    Follow-up with Dr Reji Galo in 3 weeks, call [phone] for appt    Pt had regular insulin IV at 2:15    Information obtained by :  I understand that if any problems occur once I am at home I am to contact my physician. I understand and acknowledge receipt of the instructions indicated above.                                                                                                                                            Physician's or R.N.'s Signature                                                                  Date/Time                                                                                                                                              Patient or Representative Signature                                                          Date/Time    ______________________________________________________________________    Anesthesia Discharge Instructions    After general anesthesia or intervenous sedation, for 24 hours or while taking prescription Narcotics:  · Limit your activities  · Do not drive or operate hazardous machinery  · If you have not urinated within 8 hours after discharge, please contact your surgeon on call. · Do not make important personal or business decisions  · Do not drink alcoholic beverages    Report the following to your surgeon:  · Excessive pain, swelling, redness or odor of or around the surgical area  · Temperature over 100.5 degrees  · Nausea and vomiting lasting longer than 4 hours or if unable to take medication  · Any signs of decreased circulation or nerve impairment to extremity:  Change in color, persistent numbness, tingling, coldness or increased pain.   · Any questions

## 2020-07-17 ENCOUNTER — HOSPITAL ENCOUNTER (OUTPATIENT)
Dept: PREADMISSION TESTING | Age: 65
Discharge: HOME | End: 2020-07-17
Payer: MEDICARE

## 2020-07-17 VITALS
RESPIRATION RATE: 16 BRPM | SYSTOLIC BLOOD PRESSURE: 84 MMHG | HEIGHT: 73 IN | TEMPERATURE: 99.6 F | WEIGHT: 194 LBS | HEART RATE: 80 BPM | BODY MASS INDEX: 25.71 KG/M2 | DIASTOLIC BLOOD PRESSURE: 56 MMHG

## 2020-07-17 DIAGNOSIS — U07.1 COVID-19: ICD-10-CM

## 2020-07-17 LAB
ALBUMIN SERPL-MCNC: 3.2 G/DL (ref 3.5–5)
ALBUMIN/GLOB SERPL: 0.7 {RATIO} (ref 1.1–2.2)
ALP SERPL-CCNC: 78 U/L (ref 45–117)
ALT SERPL-CCNC: 86 U/L (ref 12–78)
ANION GAP SERPL CALC-SCNC: 6 MMOL/L (ref 5–15)
APTT PPP: 29.1 SEC (ref 22.1–32)
AST SERPL-CCNC: 59 U/L (ref 15–37)
BILIRUB SERPL-MCNC: 0.4 MG/DL (ref 0.2–1)
BUN SERPL-MCNC: 17 MG/DL (ref 6–20)
BUN/CREAT SERPL: 4 (ref 12–20)
CALCIUM SERPL-MCNC: 8.9 MG/DL (ref 8.5–10.1)
CHLORIDE SERPL-SCNC: 95 MMOL/L (ref 97–108)
CO2 SERPL-SCNC: 31 MMOL/L (ref 21–32)
CREAT SERPL-MCNC: 4.23 MG/DL (ref 0.7–1.3)
EST. AVERAGE GLUCOSE BLD GHB EST-MCNC: 148 MG/DL
GLOBULIN SER CALC-MCNC: 4.4 G/DL (ref 2–4)
GLUCOSE SERPL-MCNC: 342 MG/DL (ref 65–100)
HBA1C MFR BLD: 6.8 % (ref 4–5.6)
INR PPP: 1.1 (ref 0.9–1.1)
POTASSIUM SERPL-SCNC: 3.1 MMOL/L (ref 3.5–5.1)
PROT SERPL-MCNC: 7.6 G/DL (ref 6.4–8.2)
PROTHROMBIN TIME: 11.3 SEC (ref 9–11.1)
SODIUM SERPL-SCNC: 132 MMOL/L (ref 136–145)
THERAPEUTIC RANGE,PTTT: NORMAL SECS (ref 58–77)

## 2020-07-17 PROCEDURE — 86900 BLOOD TYPING SEROLOGIC ABO: CPT

## 2020-07-17 PROCEDURE — 36415 COLL VENOUS BLD VENIPUNCTURE: CPT

## 2020-07-17 PROCEDURE — 93005 ELECTROCARDIOGRAM TRACING: CPT

## 2020-07-17 PROCEDURE — 85025 COMPLETE CBC W/AUTO DIFF WBC: CPT

## 2020-07-17 PROCEDURE — 85730 THROMBOPLASTIN TIME PARTIAL: CPT

## 2020-07-17 PROCEDURE — 80053 COMPREHEN METABOLIC PANEL: CPT

## 2020-07-17 PROCEDURE — 86920 COMPATIBILITY TEST SPIN: CPT

## 2020-07-17 PROCEDURE — 87635 SARS-COV-2 COVID-19 AMP PRB: CPT

## 2020-07-17 PROCEDURE — 85610 PROTHROMBIN TIME: CPT

## 2020-07-17 PROCEDURE — 86902 BLOOD TYPE ANTIGEN DONOR EA: CPT

## 2020-07-17 PROCEDURE — 86922 COMPATIBILITY TEST ANTIGLOB: CPT

## 2020-07-17 PROCEDURE — 86870 RBC ANTIBODY IDENTIFICATION: CPT

## 2020-07-17 PROCEDURE — 83036 HEMOGLOBIN GLYCOSYLATED A1C: CPT

## 2020-07-17 PROCEDURE — 86905 BLOOD TYPING RBC ANTIGENS: CPT

## 2020-07-17 PROCEDURE — 86921 COMPATIBILITY TEST INCUBATE: CPT

## 2020-07-17 RX ORDER — ACETAMINOPHEN 325 MG/1
650 TABLET ORAL
Status: ON HOLD | COMMUNITY
End: 2020-07-21

## 2020-07-17 RX ORDER — BISMUTH SUBSALICYLATE 262 MG
1 TABLET,CHEWABLE ORAL DAILY
COMMUNITY

## 2020-07-17 RX ORDER — INSULIN GLARGINE 300 U/ML
20 INJECTION, SOLUTION SUBCUTANEOUS DAILY
Status: ON HOLD | COMMUNITY
End: 2020-08-21 | Stop reason: SDUPTHER

## 2020-07-17 NOTE — PERIOP NOTES
DO NOT EAT OR DRINK ANYTHING AFTER MIDNIGHT, except as instructed THE NIGHT BEFORE SURGERY. PT GIVEN OPPORTUNITY TO ASK ADDITIONAL QUESTIONS. Patient given two bottles CHG soap and instruction sheet, instructions for use reviewed with patient. Patient given surgical site infection information FAQs handout and hand hygiene tips sheet. Pre-operative instructions reviewed and patient verbalizes understanding of instructions. Patient has been given the opportunity to ask additional questions. NURSING HOME: [facility] [address]. SPOKE W/ REYES KHAN AND REQUESTED A CURRENT MEDICATION LIST TO BE FAXED TO PAT. PT STATED THE LIST FROM DIALYSIS WAS NOT CORRECT. HE STATES HE IS NOT TAKING PLAVIX. [facility] APPOINTMENTS REVIEWED AND GIVEN TO PATIENT, INCLUDING PHONE NUMBER TO REGISTRATION. COVID-19 INSTRUCTION SHEET ALSO REVIEWED AND GIVEN TO PATIENT. SISTER, LITTLE WILL BRING PT TO HOSPITAL ON DOS. STATES SHE IS AWARE OF PROTOCOL ALREADY.     PAT INSTRUCTIONS WILL NEED TO BE FAXED TO [facility] @ [phone]

## 2020-07-18 LAB
ATRIAL RATE: 106 BPM
BASOPHILS # BLD: 0 K/UL (ref 0–0.1)
BASOPHILS NFR BLD: 0 % (ref 0–1)
CALCULATED P AXIS, ECG09: 36 DEGREES
CALCULATED R AXIS, ECG10: -17 DEGREES
CALCULATED T AXIS, ECG11: 52 DEGREES
DIAGNOSIS, 93000: NORMAL
DIFFERENTIAL METHOD BLD: ABNORMAL
EOSINOPHIL # BLD: 0.1 K/UL (ref 0–0.4)
EOSINOPHIL NFR BLD: 1 % (ref 0–7)
ERYTHROCYTE [DISTWIDTH] IN BLOOD BY AUTOMATED COUNT: 14.6 % (ref 11.5–14.5)
HCT VFR BLD AUTO: 30.7 % (ref 36.6–50.3)
HGB BLD-MCNC: 9.8 G/DL (ref 12.1–17)
IMM GRANULOCYTES # BLD AUTO: 0 K/UL (ref 0–0.04)
IMM GRANULOCYTES NFR BLD AUTO: 0 % (ref 0–0.5)
LYMPHOCYTES # BLD: 1.2 K/UL (ref 0.8–3.5)
LYMPHOCYTES NFR BLD: 16 % (ref 12–49)
MCH RBC QN AUTO: 31.5 PG (ref 26–34)
MCHC RBC AUTO-ENTMCNC: 31.9 G/DL (ref 30–36.5)
MCV RBC AUTO: 98.7 FL (ref 80–99)
MONOCYTES # BLD: 0.3 K/UL (ref 0–1)
MONOCYTES NFR BLD: 4 % (ref 5–13)
NEUTS SEG # BLD: 6 K/UL (ref 1.8–8)
NEUTS SEG NFR BLD: 79 % (ref 32–75)
NRBC # BLD: 0.02 K/UL (ref 0–0.01)
NRBC BLD-RTO: 0.3 PER 100 WBC
P-R INTERVAL, ECG05: 204 MS
PLATELET # BLD AUTO: 269 K/UL (ref 150–400)
PLATELET COMMENTS,PCOM: ABNORMAL
PMV BLD AUTO: 10.4 FL (ref 8.9–12.9)
Q-T INTERVAL, ECG07: 362 MS
QRS DURATION, ECG06: 98 MS
QTC CALCULATION (BEZET), ECG08: 480 MS
RBC # BLD AUTO: 3.11 M/UL (ref 4.1–5.7)
RBC MORPH BLD: ABNORMAL
RBC MORPH BLD: ABNORMAL
VENTRICULAR RATE, ECG03: 106 BPM
WBC # BLD AUTO: 7.6 K/UL (ref 4.1–11.1)

## 2020-07-19 LAB — SARS-COV-2, COV2NT: NOT DETECTED

## 2020-07-20 ENCOUNTER — ANESTHESIA EVENT (OUTPATIENT)
Dept: CARDIOTHORACIC SURGERY | Age: 65
DRG: 239 | End: 2020-07-20
Payer: MEDICARE

## 2020-07-21 ENCOUNTER — ANESTHESIA (OUTPATIENT)
Dept: CARDIOTHORACIC SURGERY | Age: 65
DRG: 239 | End: 2020-07-21
Payer: MEDICARE

## 2020-07-21 ENCOUNTER — HOSPITAL ENCOUNTER (INPATIENT)
Age: 65
LOS: 3 days | Discharge: NURSING, REHAB OR LONG-TERM CARE | DRG: 239 | End: 2020-07-24
Payer: MEDICARE

## 2020-07-21 DIAGNOSIS — I70.262 ATHEROSCLEROSIS OF NATIVE ARTERY OF LEFT LOWER EXTREMITY WITH GANGRENE (HCC): Primary | ICD-10-CM

## 2020-07-21 PROBLEM — I70.269 ATHEROSCLEROSIS OF NATIVE ARTERIES OF THE EXTREMITIES WITH GANGRENE (HCC): Status: ACTIVE | Noted: 2020-07-21

## 2020-07-21 LAB
GLUCOSE BLD STRIP.AUTO-MCNC: 182 MG/DL (ref 65–100)
GLUCOSE BLD STRIP.AUTO-MCNC: 210 MG/DL (ref 65–100)
GLUCOSE BLD STRIP.AUTO-MCNC: 235 MG/DL (ref 65–100)
GLUCOSE BLD STRIP.AUTO-MCNC: 270 MG/DL (ref 65–100)
GLUCOSE BLD STRIP.AUTO-MCNC: 288 MG/DL (ref 65–100)
GLUCOSE BLD STRIP.AUTO-MCNC: 290 MG/DL (ref 65–100)
POTASSIUM SERPL-SCNC: 3.5 MMOL/L (ref 3.5–5.1)
SERVICE CMNT-IMP: ABNORMAL

## 2020-07-21 PROCEDURE — 74011250636 HC RX REV CODE- 250/636: Performed by: ANESTHESIOLOGY

## 2020-07-21 PROCEDURE — 77030018836 HC SOL IRR NACL ICUM -A

## 2020-07-21 PROCEDURE — 74011000250 HC RX REV CODE- 250: Performed by: NURSE ANESTHETIST, CERTIFIED REGISTERED

## 2020-07-21 PROCEDURE — 77030026438 HC STYL ET INTUB CARD -A: Performed by: NURSE ANESTHETIST, CERTIFIED REGISTERED

## 2020-07-21 PROCEDURE — 36415 COLL VENOUS BLD VENIPUNCTURE: CPT

## 2020-07-21 PROCEDURE — 82962 GLUCOSE BLOOD TEST: CPT

## 2020-07-21 PROCEDURE — 74011250636 HC RX REV CODE- 250/636

## 2020-07-21 PROCEDURE — 77030040922 HC BLNKT HYPOTHRM STRY -A

## 2020-07-21 PROCEDURE — 74011000250 HC RX REV CODE- 250

## 2020-07-21 PROCEDURE — 74011000258 HC RX REV CODE- 258: Performed by: NURSE ANESTHETIST, CERTIFIED REGISTERED

## 2020-07-21 PROCEDURE — 77030011640 HC PAD GRND REM COVD -A

## 2020-07-21 PROCEDURE — 74011636637 HC RX REV CODE- 636/637

## 2020-07-21 PROCEDURE — 77030031139 HC SUT VCRL2 J&J -A

## 2020-07-21 PROCEDURE — 84132 ASSAY OF SERUM POTASSIUM: CPT

## 2020-07-21 PROCEDURE — 77030008684 HC TU ET CUF COVD -B: Performed by: NURSE ANESTHETIST, CERTIFIED REGISTERED

## 2020-07-21 PROCEDURE — 88307 TISSUE EXAM BY PATHOLOGIST: CPT

## 2020-07-21 PROCEDURE — 0Y6J0Z3 DETACHMENT AT LEFT LOWER LEG, LOW, OPEN APPROACH: ICD-10-PCS

## 2020-07-21 PROCEDURE — 65270000029 HC RM PRIVATE

## 2020-07-21 PROCEDURE — 74011250637 HC RX REV CODE- 250/637

## 2020-07-21 PROCEDURE — 77030025655 HC BLD SAW GIGLI BIOM -B

## 2020-07-21 PROCEDURE — 74011250637 HC RX REV CODE- 250/637: Performed by: ANESTHESIOLOGY

## 2020-07-21 PROCEDURE — 74011250636 HC RX REV CODE- 250/636: Performed by: NURSE ANESTHETIST, CERTIFIED REGISTERED

## 2020-07-21 PROCEDURE — 74011636637 HC RX REV CODE- 636/637: Performed by: ANESTHESIOLOGY

## 2020-07-21 PROCEDURE — 76210000006 HC OR PH I REC 0.5 TO 1 HR

## 2020-07-21 PROCEDURE — 77030018846 HC SOL IRR STRL H20 ICUM -A

## 2020-07-21 PROCEDURE — 76060000033 HC ANESTHESIA 1 TO 1.5 HR

## 2020-07-21 PROCEDURE — 88311 DECALCIFY TISSUE: CPT

## 2020-07-21 PROCEDURE — 77030008462 HC STPLR SKN PROX J&J -A

## 2020-07-21 PROCEDURE — 76010000149 HC OR TIME 1 TO 1.5 HR

## 2020-07-21 RX ORDER — LIDOCAINE HYDROCHLORIDE 20 MG/ML
INJECTION, SOLUTION EPIDURAL; INFILTRATION; INTRACAUDAL; PERINEURAL AS NEEDED
Status: DISCONTINUED | OUTPATIENT
Start: 2020-07-21 | End: 2020-07-21 | Stop reason: HOSPADM

## 2020-07-21 RX ORDER — PHENYLEPHRINE HCL IN 0.9% NACL 0.4MG/10ML
SYRINGE (ML) INTRAVENOUS AS NEEDED
Status: DISCONTINUED | OUTPATIENT
Start: 2020-07-21 | End: 2020-07-21 | Stop reason: HOSPADM

## 2020-07-21 RX ORDER — ONDANSETRON 2 MG/ML
4 INJECTION INTRAMUSCULAR; INTRAVENOUS
Status: DISCONTINUED | OUTPATIENT
Start: 2020-07-21 | End: 2020-07-24 | Stop reason: HOSPADM

## 2020-07-21 RX ORDER — HEPARIN SODIUM 5000 [USP'U]/ML
5000 INJECTION, SOLUTION INTRAVENOUS; SUBCUTANEOUS EVERY 8 HOURS
Status: DISCONTINUED | OUTPATIENT
Start: 2020-07-21 | End: 2020-07-24 | Stop reason: HOSPADM

## 2020-07-21 RX ORDER — MIDAZOLAM HYDROCHLORIDE 1 MG/ML
0.5 INJECTION, SOLUTION INTRAMUSCULAR; INTRAVENOUS
Status: DISCONTINUED | OUTPATIENT
Start: 2020-07-21 | End: 2020-07-21 | Stop reason: HOSPADM

## 2020-07-21 RX ORDER — SODIUM CHLORIDE, SODIUM LACTATE, POTASSIUM CHLORIDE, CALCIUM CHLORIDE 600; 310; 30; 20 MG/100ML; MG/100ML; MG/100ML; MG/100ML
1000 INJECTION, SOLUTION INTRAVENOUS CONTINUOUS
Status: DISCONTINUED | OUTPATIENT
Start: 2020-07-21 | End: 2020-07-21 | Stop reason: HOSPADM

## 2020-07-21 RX ORDER — ONDANSETRON 2 MG/ML
4 INJECTION INTRAMUSCULAR; INTRAVENOUS AS NEEDED
Status: DISCONTINUED | OUTPATIENT
Start: 2020-07-21 | End: 2020-07-21 | Stop reason: HOSPADM

## 2020-07-21 RX ORDER — ATORVASTATIN CALCIUM 40 MG/1
40 TABLET, FILM COATED ORAL DAILY
Status: DISCONTINUED | OUTPATIENT
Start: 2020-07-22 | End: 2020-07-24 | Stop reason: HOSPADM

## 2020-07-21 RX ORDER — SODIUM CHLORIDE 9 MG/ML
25 INJECTION, SOLUTION INTRAVENOUS CONTINUOUS
Status: DISCONTINUED | OUTPATIENT
Start: 2020-07-21 | End: 2020-07-21 | Stop reason: HOSPADM

## 2020-07-21 RX ORDER — ZOLPIDEM TARTRATE 5 MG/1
5 TABLET ORAL
Status: DISCONTINUED | OUTPATIENT
Start: 2020-07-21 | End: 2020-07-24 | Stop reason: HOSPADM

## 2020-07-21 RX ORDER — OXYCODONE HYDROCHLORIDE 5 MG/1
5 TABLET ORAL AS NEEDED
Status: DISCONTINUED | OUTPATIENT
Start: 2020-07-21 | End: 2020-07-21 | Stop reason: HOSPADM

## 2020-07-21 RX ORDER — TAMSULOSIN HYDROCHLORIDE 0.4 MG/1
0.4 CAPSULE ORAL
Status: DISCONTINUED | OUTPATIENT
Start: 2020-07-21 | End: 2020-07-24 | Stop reason: HOSPADM

## 2020-07-21 RX ORDER — MIDAZOLAM HYDROCHLORIDE 1 MG/ML
1 INJECTION, SOLUTION INTRAMUSCULAR; INTRAVENOUS AS NEEDED
Status: DISCONTINUED | OUTPATIENT
Start: 2020-07-21 | End: 2020-07-21 | Stop reason: HOSPADM

## 2020-07-21 RX ORDER — SODIUM CHLORIDE 0.9 % (FLUSH) 0.9 %
5-40 SYRINGE (ML) INJECTION EVERY 8 HOURS
Status: DISCONTINUED | OUTPATIENT
Start: 2020-07-21 | End: 2020-07-24 | Stop reason: HOSPADM

## 2020-07-21 RX ORDER — ACETAMINOPHEN 325 MG/1
650 TABLET ORAL
Status: DISCONTINUED | OUTPATIENT
Start: 2020-07-21 | End: 2020-07-24 | Stop reason: HOSPADM

## 2020-07-21 RX ORDER — PROPOFOL 10 MG/ML
INJECTION, EMULSION INTRAVENOUS AS NEEDED
Status: DISCONTINUED | OUTPATIENT
Start: 2020-07-21 | End: 2020-07-21 | Stop reason: HOSPADM

## 2020-07-21 RX ORDER — FENTANYL CITRATE 50 UG/ML
25 INJECTION, SOLUTION INTRAMUSCULAR; INTRAVENOUS
Status: COMPLETED | OUTPATIENT
Start: 2020-07-21 | End: 2020-07-21

## 2020-07-21 RX ORDER — MORPHINE SULFATE 10 MG/ML
2 INJECTION, SOLUTION INTRAMUSCULAR; INTRAVENOUS
Status: DISCONTINUED | OUTPATIENT
Start: 2020-07-21 | End: 2020-07-21 | Stop reason: HOSPADM

## 2020-07-21 RX ORDER — ACETAMINOPHEN 325 MG/1
650 TABLET ORAL ONCE
Status: COMPLETED | OUTPATIENT
Start: 2020-07-21 | End: 2020-07-21

## 2020-07-21 RX ORDER — ROCURONIUM BROMIDE 10 MG/ML
INJECTION, SOLUTION INTRAVENOUS AS NEEDED
Status: DISCONTINUED | OUTPATIENT
Start: 2020-07-21 | End: 2020-07-21 | Stop reason: HOSPADM

## 2020-07-21 RX ORDER — SODIUM CHLORIDE 9 MG/ML
25 INJECTION, SOLUTION INTRAVENOUS CONTINUOUS
Status: DISCONTINUED | OUTPATIENT
Start: 2020-07-21 | End: 2020-07-22

## 2020-07-21 RX ORDER — FENTANYL CITRATE 50 UG/ML
INJECTION, SOLUTION INTRAMUSCULAR; INTRAVENOUS AS NEEDED
Status: DISCONTINUED | OUTPATIENT
Start: 2020-07-21 | End: 2020-07-21 | Stop reason: HOSPADM

## 2020-07-21 RX ORDER — SODIUM CHLORIDE 0.9 % (FLUSH) 0.9 %
5-40 SYRINGE (ML) INJECTION AS NEEDED
Status: DISCONTINUED | OUTPATIENT
Start: 2020-07-21 | End: 2020-07-24 | Stop reason: HOSPADM

## 2020-07-21 RX ORDER — ROPIVACAINE HYDROCHLORIDE 5 MG/ML
150 INJECTION, SOLUTION EPIDURAL; INFILTRATION; PERINEURAL AS NEEDED
Status: DISCONTINUED | OUTPATIENT
Start: 2020-07-21 | End: 2020-07-21 | Stop reason: HOSPADM

## 2020-07-21 RX ORDER — SODIUM CHLORIDE, SODIUM LACTATE, POTASSIUM CHLORIDE, CALCIUM CHLORIDE 600; 310; 30; 20 MG/100ML; MG/100ML; MG/100ML; MG/100ML
100 INJECTION, SOLUTION INTRAVENOUS CONTINUOUS
Status: DISCONTINUED | OUTPATIENT
Start: 2020-07-21 | End: 2020-07-21 | Stop reason: HOSPADM

## 2020-07-21 RX ORDER — ONDANSETRON 2 MG/ML
INJECTION INTRAMUSCULAR; INTRAVENOUS AS NEEDED
Status: DISCONTINUED | OUTPATIENT
Start: 2020-07-21 | End: 2020-07-21 | Stop reason: HOSPADM

## 2020-07-21 RX ORDER — LATANOPROST 50 UG/ML
1 SOLUTION/ DROPS OPHTHALMIC
Status: DISCONTINUED | OUTPATIENT
Start: 2020-07-21 | End: 2020-07-24 | Stop reason: HOSPADM

## 2020-07-21 RX ORDER — MAGNESIUM SULFATE 100 %
4 CRYSTALS MISCELLANEOUS AS NEEDED
Status: DISCONTINUED | OUTPATIENT
Start: 2020-07-21 | End: 2020-07-24 | Stop reason: HOSPADM

## 2020-07-21 RX ORDER — HYDROMORPHONE HYDROCHLORIDE 1 MG/ML
0.5 INJECTION, SOLUTION INTRAMUSCULAR; INTRAVENOUS; SUBCUTANEOUS
Status: DISCONTINUED | OUTPATIENT
Start: 2020-07-21 | End: 2020-07-24 | Stop reason: HOSPADM

## 2020-07-21 RX ORDER — FENTANYL CITRATE 50 UG/ML
50 INJECTION, SOLUTION INTRAMUSCULAR; INTRAVENOUS AS NEEDED
Status: DISCONTINUED | OUTPATIENT
Start: 2020-07-21 | End: 2020-07-21 | Stop reason: HOSPADM

## 2020-07-21 RX ORDER — EPHEDRINE SULFATE/0.9% NACL/PF 50 MG/5 ML
5 SYRINGE (ML) INTRAVENOUS AS NEEDED
Status: DISCONTINUED | OUTPATIENT
Start: 2020-07-21 | End: 2020-07-21 | Stop reason: HOSPADM

## 2020-07-21 RX ORDER — CLOPIDOGREL BISULFATE 75 MG/1
75 TABLET ORAL DAILY
Status: DISCONTINUED | OUTPATIENT
Start: 2020-07-22 | End: 2020-07-24 | Stop reason: HOSPADM

## 2020-07-21 RX ORDER — DIPHENHYDRAMINE HYDROCHLORIDE 50 MG/ML
12.5 INJECTION, SOLUTION INTRAMUSCULAR; INTRAVENOUS AS NEEDED
Status: DISCONTINUED | OUTPATIENT
Start: 2020-07-21 | End: 2020-07-21 | Stop reason: HOSPADM

## 2020-07-21 RX ORDER — HYDROCODONE BITARTRATE AND ACETAMINOPHEN 7.5; 325 MG/1; MG/1
1 TABLET ORAL
Status: DISCONTINUED | OUTPATIENT
Start: 2020-07-21 | End: 2020-07-24 | Stop reason: HOSPADM

## 2020-07-21 RX ORDER — TIMOLOL MALEATE 2.5 MG/ML
1 SOLUTION/ DROPS OPHTHALMIC 2 TIMES DAILY
Status: DISCONTINUED | OUTPATIENT
Start: 2020-07-21 | End: 2020-07-24 | Stop reason: HOSPADM

## 2020-07-21 RX ORDER — ISOSORBIDE MONONITRATE 60 MG/1
60 TABLET, EXTENDED RELEASE ORAL
Status: DISCONTINUED | OUTPATIENT
Start: 2020-07-21 | End: 2020-07-24 | Stop reason: HOSPADM

## 2020-07-21 RX ORDER — CEFAZOLIN SODIUM/WATER 2 G/20 ML
2 SYRINGE (ML) INTRAVENOUS ONCE
Status: DISCONTINUED | OUTPATIENT
Start: 2020-07-21 | End: 2020-07-21 | Stop reason: HOSPADM

## 2020-07-21 RX ORDER — HYDROMORPHONE HYDROCHLORIDE 1 MG/ML
0.2 INJECTION, SOLUTION INTRAMUSCULAR; INTRAVENOUS; SUBCUTANEOUS
Status: COMPLETED | OUTPATIENT
Start: 2020-07-21 | End: 2020-07-21

## 2020-07-21 RX ORDER — SUCCINYLCHOLINE CHLORIDE 20 MG/ML
INJECTION INTRAMUSCULAR; INTRAVENOUS AS NEEDED
Status: DISCONTINUED | OUTPATIENT
Start: 2020-07-21 | End: 2020-07-21 | Stop reason: HOSPADM

## 2020-07-21 RX ORDER — INSULIN LISPRO 100 [IU]/ML
INJECTION, SOLUTION INTRAVENOUS; SUBCUTANEOUS
Status: DISCONTINUED | OUTPATIENT
Start: 2020-07-21 | End: 2020-07-24 | Stop reason: HOSPADM

## 2020-07-21 RX ORDER — INSULIN GLARGINE 100 [IU]/ML
17 INJECTION, SOLUTION SUBCUTANEOUS
Status: DISCONTINUED | OUTPATIENT
Start: 2020-07-21 | End: 2020-07-24 | Stop reason: HOSPADM

## 2020-07-21 RX ORDER — LIDOCAINE HYDROCHLORIDE 10 MG/ML
0.1 INJECTION, SOLUTION EPIDURAL; INFILTRATION; INTRACAUDAL; PERINEURAL AS NEEDED
Status: DISCONTINUED | OUTPATIENT
Start: 2020-07-21 | End: 2020-07-21 | Stop reason: HOSPADM

## 2020-07-21 RX ORDER — SODIUM CHLORIDE 9 MG/ML
INJECTION, SOLUTION INTRAVENOUS
Status: DISCONTINUED | OUTPATIENT
Start: 2020-07-21 | End: 2020-07-21 | Stop reason: HOSPADM

## 2020-07-21 RX ORDER — DOCUSATE SODIUM 100 MG/1
100 CAPSULE, LIQUID FILLED ORAL 2 TIMES DAILY
Status: DISCONTINUED | OUTPATIENT
Start: 2020-07-21 | End: 2020-07-24 | Stop reason: HOSPADM

## 2020-07-21 RX ORDER — HYDROMORPHONE HYDROCHLORIDE 2 MG/ML
INJECTION, SOLUTION INTRAMUSCULAR; INTRAVENOUS; SUBCUTANEOUS AS NEEDED
Status: DISCONTINUED | OUTPATIENT
Start: 2020-07-21 | End: 2020-07-21 | Stop reason: HOSPADM

## 2020-07-21 RX ORDER — CALCIUM ACETATE 667 MG/1
3 TABLET ORAL
Status: DISCONTINUED | OUTPATIENT
Start: 2020-07-21 | End: 2020-07-24 | Stop reason: HOSPADM

## 2020-07-21 RX ORDER — DEXTROSE MONOHYDRATE 100 MG/ML
0-250 INJECTION, SOLUTION INTRAVENOUS AS NEEDED
Status: DISCONTINUED | OUTPATIENT
Start: 2020-07-21 | End: 2020-07-24 | Stop reason: HOSPADM

## 2020-07-21 RX ADMIN — TRAZODONE HYDROCHLORIDE 150 MG: 100 TABLET ORAL at 21:07

## 2020-07-21 RX ADMIN — FENTANYL CITRATE 25 MCG: 50 INJECTION INTRAMUSCULAR; INTRAVENOUS at 09:25

## 2020-07-21 RX ADMIN — TIMOLOL MALEATE 1 DROP: 2.5 SOLUTION/ DROPS OPHTHALMIC at 18:22

## 2020-07-21 RX ADMIN — PROPOFOL 30 MG: 10 INJECTION, EMULSION INTRAVENOUS at 08:21

## 2020-07-21 RX ADMIN — HEPARIN SODIUM 5000 UNITS: 5000 INJECTION INTRAVENOUS; SUBCUTANEOUS at 21:08

## 2020-07-21 RX ADMIN — PROPOFOL 30 MG: 10 INJECTION, EMULSION INTRAVENOUS at 08:09

## 2020-07-21 RX ADMIN — SUCCINYLCHOLINE CHLORIDE 100 MG: 20 INJECTION, SOLUTION INTRAMUSCULAR; INTRAVENOUS at 07:41

## 2020-07-21 RX ADMIN — HYDROMORPHONE HYDROCHLORIDE 0.2 MG: 1 INJECTION, SOLUTION INTRAMUSCULAR; INTRAVENOUS; SUBCUTANEOUS at 10:15

## 2020-07-21 RX ADMIN — FENTANYL CITRATE 100 MCG: 50 INJECTION, SOLUTION INTRAMUSCULAR; INTRAVENOUS at 07:38

## 2020-07-21 RX ADMIN — LATANOPROST 1 DROP: 50 SOLUTION OPHTHALMIC at 22:00

## 2020-07-21 RX ADMIN — HYDROMORPHONE HYDROCHLORIDE 0.2 MG: 1 INJECTION, SOLUTION INTRAMUSCULAR; INTRAVENOUS; SUBCUTANEOUS at 10:40

## 2020-07-21 RX ADMIN — ROCURONIUM BROMIDE 5 MG: 10 SOLUTION INTRAVENOUS at 07:38

## 2020-07-21 RX ADMIN — Medication 10 ML: at 22:00

## 2020-07-21 RX ADMIN — ISOSORBIDE MONONITRATE 60 MG: 60 TABLET, EXTENDED RELEASE ORAL at 21:10

## 2020-07-21 RX ADMIN — DOCUSATE SODIUM 100 MG: 100 CAPSULE, LIQUID FILLED ORAL at 18:22

## 2020-07-21 RX ADMIN — HYDROCODONE BITARTRATE AND ACETAMINOPHEN 1 TABLET: 7.5; 325 TABLET ORAL at 21:07

## 2020-07-21 RX ADMIN — HYDROMORPHONE HYDROCHLORIDE 0.2 MG: 2 INJECTION, SOLUTION INTRAMUSCULAR; INTRAVENOUS; SUBCUTANEOUS at 07:52

## 2020-07-21 RX ADMIN — INSULIN LISPRO 2 UNITS: 100 INJECTION, SOLUTION INTRAVENOUS; SUBCUTANEOUS at 16:42

## 2020-07-21 RX ADMIN — HYDROMORPHONE HYDROCHLORIDE 0.2 MG: 1 INJECTION, SOLUTION INTRAMUSCULAR; INTRAVENOUS; SUBCUTANEOUS at 10:55

## 2020-07-21 RX ADMIN — HUMAN INSULIN 5 UNITS: 100 INJECTION, SOLUTION SUBCUTANEOUS at 09:16

## 2020-07-21 RX ADMIN — ONDANSETRON HYDROCHLORIDE 4 MG: 2 INJECTION, SOLUTION INTRAMUSCULAR; INTRAVENOUS at 08:21

## 2020-07-21 RX ADMIN — FENTANYL CITRATE 25 MCG: 50 INJECTION INTRAMUSCULAR; INTRAVENOUS at 09:35

## 2020-07-21 RX ADMIN — SODIUM CHLORIDE: 900 INJECTION, SOLUTION INTRAVENOUS at 07:28

## 2020-07-21 RX ADMIN — MORPHINE SULFATE 2 MG: 10 INJECTION INTRAVENOUS at 11:30

## 2020-07-21 RX ADMIN — ACETAMINOPHEN 650 MG: 325 TABLET ORAL at 07:10

## 2020-07-21 RX ADMIN — SODIUM CHLORIDE 900 MG: 9 INJECTION, SOLUTION INTRAVENOUS at 07:47

## 2020-07-21 RX ADMIN — PHENYLEPHRINE HYDROCHLORIDE 40 MCG: 10 INJECTION INTRAVENOUS at 07:55

## 2020-07-21 RX ADMIN — PROPOFOL 40 MG: 10 INJECTION, EMULSION INTRAVENOUS at 08:03

## 2020-07-21 RX ADMIN — TAMSULOSIN HYDROCHLORIDE 0.4 MG: 0.4 CAPSULE ORAL at 21:08

## 2020-07-21 RX ADMIN — INSULIN LISPRO 5 UNITS: 100 INJECTION, SOLUTION INTRAVENOUS; SUBCUTANEOUS at 10:51

## 2020-07-21 RX ADMIN — INSULIN LISPRO 2 UNITS: 100 INJECTION, SOLUTION INTRAVENOUS; SUBCUTANEOUS at 22:11

## 2020-07-21 RX ADMIN — SODIUM CHLORIDE, SODIUM LACTATE, POTASSIUM CHLORIDE, AND CALCIUM CHLORIDE 1000 ML: 600; 310; 30; 20 INJECTION, SOLUTION INTRAVENOUS at 07:00

## 2020-07-21 RX ADMIN — MORPHINE SULFATE 2 MG: 10 INJECTION INTRAVENOUS at 13:30

## 2020-07-21 RX ADMIN — MORPHINE SULFATE 2 MG: 10 INJECTION INTRAVENOUS at 10:55

## 2020-07-21 RX ADMIN — HYDROMORPHONE HYDROCHLORIDE 0.2 MG: 1 INJECTION, SOLUTION INTRAMUSCULAR; INTRAVENOUS; SUBCUTANEOUS at 11:05

## 2020-07-21 RX ADMIN — PROPOFOL 100 MG: 10 INJECTION, EMULSION INTRAVENOUS at 07:40

## 2020-07-21 RX ADMIN — PHENYLEPHRINE HYDROCHLORIDE 80 MCG/MIN: 10 INJECTION INTRAVENOUS at 07:37

## 2020-07-21 RX ADMIN — SODIUM CHLORIDE 25 ML/HR: 900 INJECTION, SOLUTION INTRAVENOUS at 09:19

## 2020-07-21 RX ADMIN — HYDROMORPHONE HYDROCHLORIDE 0.2 MG: 1 INJECTION, SOLUTION INTRAMUSCULAR; INTRAVENOUS; SUBCUTANEOUS at 10:25

## 2020-07-21 RX ADMIN — PROPOFOL 30 MG: 10 INJECTION, EMULSION INTRAVENOUS at 07:49

## 2020-07-21 RX ADMIN — FENTANYL CITRATE 25 MCG: 50 INJECTION INTRAMUSCULAR; INTRAVENOUS at 09:19

## 2020-07-21 RX ADMIN — LIDOCAINE HYDROCHLORIDE 100 MG: 20 INJECTION, SOLUTION EPIDURAL; INFILTRATION; INTRACAUDAL; PERINEURAL at 07:38

## 2020-07-21 RX ADMIN — Medication 2001 MG: at 16:30

## 2020-07-21 RX ADMIN — FENTANYL CITRATE 25 MCG: 50 INJECTION INTRAMUSCULAR; INTRAVENOUS at 09:30

## 2020-07-21 RX ADMIN — INSULIN GLARGINE 17 UNITS: 100 INJECTION, SOLUTION SUBCUTANEOUS at 21:11

## 2020-07-21 RX ADMIN — PHENYLEPHRINE HYDROCHLORIDE 40 MCG: 10 INJECTION INTRAVENOUS at 07:58

## 2020-07-21 NOTE — ANESTHESIA POSTPROCEDURE EVALUATION
Post-Anesthesia Evaluation and Assessment    Patient: Mariya Pastor MRN: [id number]  SSN: [id number]    YOB: 1955  Age: 75 y.o. Sex: male      I have evaluated the patient and they are stable and ready for discharge from the PACU. Cardiovascular Function/Vital Signs  Visit Vitals  BP 93/74   Pulse 78   Temp 36.4 °C (97.6 °F)   Resp 19   Wt 88 kg (194 lb)   SpO2 100%   BMI 25.60 kg/m²       Patient is status post General anesthesia for Procedure(s):  LEFT BELOW KNEE AMPUTATION (BKA). Nausea/Vomiting: None    Postoperative hydration reviewed and adequate. Pain:  Pain Scale 1: Visual (07/21/20 0852)  Pain Intensity 1: 0 (07/21/20 0852)   Managed    Neurological Status:   Neuro (WDL): Exceptions to WDL(hx of stroke) (07/21/20 0852)  Neuro  Neurologic State: Sleeping (07/21/20 0852)  LUE Motor Response: Spontaneous  (07/21/20 0852)  LLE Motor Response: Spontaneous  (07/21/20 0852)   At baseline    Mental Status, Level of Consciousness: Alert and  oriented to person, place, and time    Pulmonary Status:   O2 Device: CO2 nasal cannula (07/21/20 0852)   Adequate oxygenation and airway patent    Complications related to anesthesia: None    Post-anesthesia assessment completed. No concerns    Signed By: Jonnie Phoenix, MD     July 21, 2020              Procedure(s):  LEFT BELOW KNEE AMPUTATION (BKA). general    <BSHSIANPOST>    INITIAL Post-op Vital signs:   Vitals Value Taken Time   BP 93/74 7/21/2020  9:20 AM   Temp 36.4 °C (97.6 °F) 7/21/2020  8:52 AM   Pulse 77 7/21/2020  9:26 AM   Resp 19 7/21/2020  9:26 AM   SpO2 100 % 7/21/2020  9:26 AM   Vitals shown include unvalidated device data.

## 2020-07-21 NOTE — OP NOTES
[address]   OPERATIVE REPORT    Name:  Garrett Morales  MR#:  [id number]  :  1955  ACCOUNT #:  [de-identified]  DATE OF SERVICE:  2020      PREOPERATIVE DIAGNOSIS:  Peripheral vascular disease with nonhealing left foot wound. POSTOPERATIVE DIAGNOSIS:  Peripheral vascular disease with nonhealing left foot wound. PROCEDURE PERFORMED:  Left below-knee amputation. SURGEON:  Markos Reynolds MD    ASSISTANT:  Donna Galvan. ANESTHESIA:  General.    COMPLICATIONS:  None. SPECIMENS REMOVED:  Left lower leg and foot. IMPLANTS:  None. ESTIMATED BLOOD LOSS:  50 mL. INDICATIONS:  The patient is a 78-year-old male with diabetes and end-stage renal disease on hemodialysis who presents with a nonhealing left foot wound. He has undergone amputation of his great toe with subsequent debridement of the toe and 2 tibial angioplasties. Despite this, the wound is healing poorly. He will undergo below-knee amputation. PROCEDURE:  The patient's left leg was prepped and draped. A circumferential incision was made midway between the knee and ankle. The soft tissues were divided anteriorly, medially, and laterally. The skin incision included a posterior skin and muscle flap. The tibia was divided with a Gigli saw and the fibula with a bone cutter. The posterior soft tissues were then divided with an amputation knife. There was good bleeding from the soft tissues. The incision was closed with interrupted Vicryl subcutaneous and fascial sutures and skin staples. Dressings were applied and the patient was returned to the recovery room in stable condition.         Sergey Manrique MD      [id number]  D:  2020 8:39  T:  2020 13:42  JOB #:  [id number]

## 2020-07-21 NOTE — PROGRESS NOTES
TRANSFER - IN REPORT:    Verbal report received from Rober Cespedes RN(name) on Carol Paul  being received from [facility]) for routine post - op      Report consisted of patients Situation, Background, Assessment and   Recommendations(SBAR). Information from the following report(s) SBAR, Kardex and Recent Results was reviewed with the receiving nurse. Opportunity for questions and clarification was provided. Assessment completed upon patients arrival to unit and care assumed. [facility], RN and Wiliam Cuadra RN performed a dual skin assessment on this patient Impairment noted- see wound doc flow sheet  Jose score is 20    Patient came in with right heel wound POA. 1613  Bedside shift change report given to Genesis Ledesma RN (oncoming nurse) by Kika Alfredo RN (offgoing nurse). Report included the following information SBAR, Kardex and Recent Results.

## 2020-07-21 NOTE — PERIOP NOTES
TRANSFER - OUT REPORT:    Verbal report given to [employer] (name) on Joan Luke  being transferred to [address] [place] [address] (unit) for routine post - op       Report consisted of patients Situation, Background, Assessment and   Recommendations(SBAR). Time Pre op antibiotic [id number]  Anesthesia Stop time: 0371  Wilkinson Present on Transfer to floor:no  Order for Wilkinson on Chart:no  Discharge Prescriptions with Chart:no    Information from the following report(s) SBAR, OR Summary, Procedure Summary, Intake/Output, MAR, Recent Results and Cardiac Rhythm NSR was reviewed with the receiving nurse. Opportunity for questions and clarification was provided. Is the patient on 02? YES       L/Min 2    Is the patient on a monitor? NO    Is the nurse transporting with the patient? NO    Surgical Waiting Area notified of patient's transfer from PACU? YES      The following personal items collected during your admission accompanied patient upon transfer:   Dental Appliance: Dental Appliances: None  Vision:    Hearing Aid:    Jewelry: Jewelry: None  Clothing: Clothing: (bag of belongings with patient in PACU)  Other Valuables:  Other Valuables: Eyeglasses, With patient  Valuables sent to safe:

## 2020-07-21 NOTE — BRIEF OP NOTE
Brief Postoperative Note    Patient: Keith Peralta  YOB: 1955  MRN: [id number]    Date of Procedure: 7/21/2020     Pre-Op Diagnosis: DIABETES WITH PERIPHERAL ARTERIAL DISEASE    Post-Op Diagnosis: Same as preoperative diagnosis.       Procedure(s):  LEFT BELOW KNEE AMPUTATION (BKA)    Surgeon(s):  Fridea Resendiz MD    Surgical Assistant: None    Anesthesia: General     Estimated Blood Loss (mL): less than 50     Complications: None    Specimens:   ID Type Source Tests Collected by Time Destination   1 : left below the knee amputation Fresh Leg, Left  Frieda Resendiz MD 7/21/2020 2171 Pathology        Implants: * No implants in log *    Drains: * No LDAs found *    Findings: none    Electronically Signed by Lanette Phillips MD on 7/21/2020 at 8:31 AM

## 2020-07-21 NOTE — PERIOP NOTES
Patient: Fer Burks MRN: [id number]  SSN: [id number]   YOB: 1955  Age: 75 y.o. Sex: male     Patient is status post Procedure(s):  LEFT BELOW KNEE AMPUTATION (BKA). Surgeon(s) and Role:     * Pennie Smith MD - Primary    Local/Dose/Irrigation: see mar                  Peripheral IV 07/21/20 Right Wrist (Active)   Site Assessment Clean, dry, & intact 07/21/20 0707   Phlebitis Assessment 0 07/21/20 0707   Infiltration Assessment 0 07/21/20 0707   Dressing Status Clean, dry, & intact 07/21/20 0707   Dressing Type Transparent 07/21/20 0707   Hub Color/Line Status Pink 07/21/20 0707            Airway - Endotracheal Tube 07/21/20 Oral (Active)                   Dressing/Packing:  Wound Leg Left-Dressing Type: 4 x 4; Oil emulsion;ABD pad;Gauze wrap (vanda); Elastic bandage (07/21/20 0801)    Splint/Cast:  ]    Other:       .

## 2020-07-22 LAB
ANION GAP SERPL CALC-SCNC: 11 MMOL/L (ref 5–15)
BASOPHILS # BLD: 0 K/UL (ref 0–0.1)
BASOPHILS NFR BLD: 0 % (ref 0–1)
BUN SERPL-MCNC: 49 MG/DL (ref 6–20)
BUN/CREAT SERPL: 6 (ref 12–20)
CALCIUM SERPL-MCNC: 9 MG/DL (ref 8.5–10.1)
CHLORIDE SERPL-SCNC: 98 MMOL/L (ref 97–108)
CO2 SERPL-SCNC: 27 MMOL/L (ref 21–32)
CREAT SERPL-MCNC: 8.44 MG/DL (ref 0.7–1.3)
DIFFERENTIAL METHOD BLD: ABNORMAL
EOSINOPHIL # BLD: 0.2 K/UL (ref 0–0.4)
EOSINOPHIL NFR BLD: 3 % (ref 0–7)
ERYTHROCYTE [DISTWIDTH] IN BLOOD BY AUTOMATED COUNT: 15.1 % (ref 11.5–14.5)
GLUCOSE BLD STRIP.AUTO-MCNC: 165 MG/DL (ref 65–100)
GLUCOSE BLD STRIP.AUTO-MCNC: 181 MG/DL (ref 65–100)
GLUCOSE BLD STRIP.AUTO-MCNC: 227 MG/DL (ref 65–100)
GLUCOSE BLD STRIP.AUTO-MCNC: 271 MG/DL (ref 65–100)
GLUCOSE SERPL-MCNC: 214 MG/DL (ref 65–100)
HCT VFR BLD AUTO: 29.1 % (ref 36.6–50.3)
HGB BLD-MCNC: 9.1 G/DL (ref 12.1–17)
IMM GRANULOCYTES # BLD AUTO: 0 K/UL (ref 0–0.04)
IMM GRANULOCYTES NFR BLD AUTO: 0 % (ref 0–0.5)
LYMPHOCYTES # BLD: 1.1 K/UL (ref 0.8–3.5)
LYMPHOCYTES NFR BLD: 11 % (ref 12–49)
MCH RBC QN AUTO: 30.8 PG (ref 26–34)
MCHC RBC AUTO-ENTMCNC: 31.3 G/DL (ref 30–36.5)
MCV RBC AUTO: 98.6 FL (ref 80–99)
MONOCYTES # BLD: 0.6 K/UL (ref 0–1)
MONOCYTES NFR BLD: 7 % (ref 5–13)
NEUTS SEG # BLD: 7.6 K/UL (ref 1.8–8)
NEUTS SEG NFR BLD: 79 % (ref 32–75)
NRBC # BLD: 0 K/UL (ref 0–0.01)
NRBC BLD-RTO: 0 PER 100 WBC
PLATELET # BLD AUTO: 302 K/UL (ref 150–400)
PMV BLD AUTO: 9.9 FL (ref 8.9–12.9)
POTASSIUM SERPL-SCNC: 4 MMOL/L (ref 3.5–5.1)
RBC # BLD AUTO: 2.95 M/UL (ref 4.1–5.7)
SERVICE CMNT-IMP: ABNORMAL
SODIUM SERPL-SCNC: 136 MMOL/L (ref 136–145)
WBC # BLD AUTO: 9.6 K/UL (ref 4.1–11.1)

## 2020-07-22 PROCEDURE — 97161 PT EVAL LOW COMPLEX 20 MIN: CPT

## 2020-07-22 PROCEDURE — 97535 SELF CARE MNGMENT TRAINING: CPT

## 2020-07-22 PROCEDURE — 74011250636 HC RX REV CODE- 250/636: Performed by: INTERNAL MEDICINE

## 2020-07-22 PROCEDURE — 74011250637 HC RX REV CODE- 250/637

## 2020-07-22 PROCEDURE — 74011250636 HC RX REV CODE- 250/636

## 2020-07-22 PROCEDURE — 36415 COLL VENOUS BLD VENIPUNCTURE: CPT

## 2020-07-22 PROCEDURE — 85025 COMPLETE CBC W/AUTO DIFF WBC: CPT

## 2020-07-22 PROCEDURE — 74011636637 HC RX REV CODE- 636/637

## 2020-07-22 PROCEDURE — 97165 OT EVAL LOW COMPLEX 30 MIN: CPT

## 2020-07-22 PROCEDURE — 82962 GLUCOSE BLOOD TEST: CPT

## 2020-07-22 PROCEDURE — 90935 HEMODIALYSIS ONE EVALUATION: CPT

## 2020-07-22 PROCEDURE — 97530 THERAPEUTIC ACTIVITIES: CPT

## 2020-07-22 PROCEDURE — 65270000029 HC RM PRIVATE

## 2020-07-22 PROCEDURE — 77010033678 HC OXYGEN DAILY

## 2020-07-22 PROCEDURE — 80048 BASIC METABOLIC PNL TOTAL CA: CPT

## 2020-07-22 RX ADMIN — INSULIN GLARGINE 17 UNITS: 100 INJECTION, SOLUTION SUBCUTANEOUS at 21:11

## 2020-07-22 RX ADMIN — HEPARIN SODIUM 5000 UNITS: 5000 INJECTION INTRAVENOUS; SUBCUTANEOUS at 21:07

## 2020-07-22 RX ADMIN — INSULIN LISPRO 2 UNITS: 100 INJECTION, SOLUTION INTRAVENOUS; SUBCUTANEOUS at 07:07

## 2020-07-22 RX ADMIN — ISOSORBIDE MONONITRATE 60 MG: 60 TABLET, EXTENDED RELEASE ORAL at 21:07

## 2020-07-22 RX ADMIN — TAMSULOSIN HYDROCHLORIDE 0.4 MG: 0.4 CAPSULE ORAL at 21:11

## 2020-07-22 RX ADMIN — Medication 2001 MG: at 11:31

## 2020-07-22 RX ADMIN — INSULIN LISPRO 2 UNITS: 100 INJECTION, SOLUTION INTRAVENOUS; SUBCUTANEOUS at 19:42

## 2020-07-22 RX ADMIN — Medication 10 ML: at 22:00

## 2020-07-22 RX ADMIN — TIMOLOL MALEATE 1 DROP: 2.5 SOLUTION/ DROPS OPHTHALMIC at 10:21

## 2020-07-22 RX ADMIN — INSULIN LISPRO 5 UNITS: 100 INJECTION, SOLUTION INTRAVENOUS; SUBCUTANEOUS at 12:26

## 2020-07-22 RX ADMIN — CLOPIDOGREL BISULFATE 75 MG: 75 TABLET ORAL at 10:20

## 2020-07-22 RX ADMIN — DOCUSATE SODIUM 100 MG: 100 CAPSULE, LIQUID FILLED ORAL at 10:20

## 2020-07-22 RX ADMIN — LATANOPROST 1 DROP: 50 SOLUTION OPHTHALMIC at 21:12

## 2020-07-22 RX ADMIN — TRAZODONE HYDROCHLORIDE 150 MG: 100 TABLET ORAL at 21:11

## 2020-07-22 RX ADMIN — ATORVASTATIN CALCIUM 40 MG: 40 TABLET, FILM COATED ORAL at 10:20

## 2020-07-22 RX ADMIN — Medication 10 ML: at 06:00

## 2020-07-22 RX ADMIN — EPOETIN ALFA-EPBX 10000 UNITS: 10000 INJECTION, SOLUTION INTRAVENOUS; SUBCUTANEOUS at 22:26

## 2020-07-22 RX ADMIN — HYDROCODONE BITARTRATE AND ACETAMINOPHEN 1 TABLET: 7.5; 325 TABLET ORAL at 11:31

## 2020-07-22 RX ADMIN — Medication 2001 MG: at 07:07

## 2020-07-22 RX ADMIN — HYDROCODONE BITARTRATE AND ACETAMINOPHEN 1 TABLET: 7.5; 325 TABLET ORAL at 19:42

## 2020-07-22 RX ADMIN — HEPARIN SODIUM 5000 UNITS: 5000 INJECTION INTRAVENOUS; SUBCUTANEOUS at 07:07

## 2020-07-22 NOTE — DIALYSIS
HD TRANSFER - OUT REPORT:    Verbal report given to MATHEUS Green RN (Name) on Brianna Trejo being transferred to [address] [place] [address]   (Unit) for routine progression of care       Report consisted of patient's Situation, Background, Assessment and   Recommendations(SBAR). Information from the following report(s) Procedure Summary was reviewed with the receiving nurse. Method:  $$ Method: Hemodialysis (07/22/20 1545)    Fluid Removed  NET Fluid Removed (mL): 1500 ml (07/22/20 1915)     Patient response to treatment:  Stable    End Time  Hemodialysis End Time: 1915 (07/22/20 1915)  If not documented, dialysis nurse to update post-dialysis row in HD/Filtration flowsheet     Medications /Volume expansion agents or Fluid boluses administered during treatment? no    Post-dialysis medication administration due?  yes  Remind nurse to administer post-HD medication upon return to unit. Fistula hemostasis? yes      Opportunity for questions and clarification was provided.       Patient transported with: Registered Nurse

## 2020-07-22 NOTE — PROGRESS NOTES
JJ>    Reason for Admission:   Left BKA                   RUR Score:    12%                 Plan for utilizing home health:    n/a      PCP: First and Last name:  Alexsander Tomlinson   Name of Practice:    Are you a current patient: Yes/No: yes   Approximate date of last visit: sees his PCP at the nursing facility   Can you participate in a virtual visit with your PCP: yes                    Current Advanced Directive/Advance Care Plan: on file                          Transition of Care Plan:    Consult received for SNF placement. Patient resides in the long term care [facility] [address] Cm contacted them @ [phone] and they will be able to accept patient when medically stable. Referral sent via [employer]. Care Management Interventions  PCP Verified by CM: Yes  Palliative Care Criteria Met (RRAT>21 & CHF Dx)?: No  MyChart Signup: No  Discharge Durable Medical Equipment: No  Health Maintenance Reviewed: Yes  Physical Therapy Consult: Yes  Occupational Therapy Consult: Yes  Speech Therapy Consult: No  Current Support Network: Nursing Facility  Confirm Follow Up Transport: Wheel[facility]  The Patient and/or Patient Representative was Provided with a Choice of Provider and Agrees with the Discharge Plan?: Yes  Name of the Patient Representative Who was Provided with a Choice of Provider and Agrees with the Discharge Plan: Madelin Velázquez  Freedom of Choice List was Provided with Basic Dialogue that Supports the Patient's Individualized Plan of Care/Goals, Treatment Preferences and Shares the Quality Data Associated with the Providers?: Yes  [employer] Information Provided?: No  Discharge Location  Discharge Placement: Skilled nursing facility    S.  [employer]

## 2020-07-22 NOTE — WOUND CARE
WOCN Note:     New consult placed for assessment of right heel wound. Assessed in room 518. PPE: mask and gloves    Chart reviewed. 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 Assessment:   Patient is A&O x 3, communicative and requires assist with repositioning. Bed: foam mattress  Patient wearing briefs for incontinence. Patient reports no pain. Patient repositioned on left side with pillow. Heel offloaded with pillow. Sacrum and buttocks intact without erythema. 1. POA Right heel, unstageable Pressure Injury : 2.6 x 4.2 x 0 cm  98% necrotic black with 2% thin rim of red. no exudate; no odor. Periwound  without erythema. Wound, Pressure Prevention & Skin Care Recommendations:    1. Minimize layers of linen/pads under patient to optimize support surface. 2.  Turn/reposition approximately every 2 hours and offload heels. 3.  Manage incontinence and moisture. 4.  Right heel:  Daily lightly paint with betadine and allow to dry; keep open to air. Discussed above plan with patient and Paulina Lockhart RN.     Transition of Care: Plan to follow as needed while admitted to hospital.    SOLEDAD Laws RN [facility] Inpatient Wound [facility]  Available on [employer]  Pager [phone]  Office [phone]

## 2020-07-22 NOTE — PROGRESS NOTES
Problem: Mobility Impaired (Adult and Pediatric)  Goal: *Acute Goals and Plan of Care (Insert Text)  Description: FUNCTIONAL STATUS PRIOR TO ADMISSION: The patient was functional at the wheelchair level and required moderate assistance for transfers to the chair. Pt states that he was walking with therapy however unclear if this is accurate    HOME SUPPORT PRIOR TO ADMISSION: The patient lived with staff at [facility].    Physical Therapy Goals  Initiated 7/22/2020  1.  Patient will move from supine to sit and sit to supine , scoot up and down, and roll side to side in bed with supervision/set-up within 7 day(s).    2.  Patient will transfer from bed to chair and chair to bed with min a/supervision/set-up using the least restrictive device or transfer board within 7 day(s).  3.  Patient will perform sit to stand with moderate assistance  within 7 day(s).  4.  Patient will tolerate sitting without back support for 30 minutes within 7 day(s).     Outcome: Progressing Towards Goal       PHYSICAL THERAPY EVALUATION  Patient: Pankaj Mckeon (64 y.o. 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 Pt tolerates movement with heavy assist due to increased fear and pain, but able to perform balance in sitting once stable. Pt demos poor ROM and keeps knee fully flexed in supine or sitting. Pt encouraged to perform ROM at EOB and able to extend to approx 40-50 degrees lack. Pt  balances EOB and able to scoot laterally with assist to shift hips mod A, but does not tolerate standing with increased fear response. Will continue to follow. Please specify weight bearing for R foot due to black pressure wound on heel. Current Level of Function Impacting Discharge (mobility/balance): mod-max A    Functional Outcome Measure: The patient scored Total: 10/100 on the Barthel Index which is indicative of low impaired ability to care for basic self needs/dependency on others. Other factors to consider for discharge: unknown previous level of function     Patient will benefit from skilled therapy intervention to address the above noted impairments. PLAN :  Recommendations and Planned Interventions: bed mobility training, transfer training, gait training, therapeutic exercises, neuromuscular re-education, patient and family training/education and therapeutic activities      Frequency/Duration: Patient will be followed by physical therapy:  5 times a week to address goals. Recommendation for discharge: (in order for the patient to meet his/her long term goals)  Therapy up to 5 days/week in SNF setting    This discharge recommendation:  Has been made in collaboration with the attending provider and/or case management    IF patient discharges home will need the following DME: none         SUBJECTIVE:   Patient stated I would like to sit up.     OBJECTIVE DATA SUMMARY:   HISTORY:    Past Medical History:   Diagnosis Date    CAD (coronary artery disease)      2014, MI, CABG    Chronic kidney disease     on dialysis MWF    [facility]     ESR[facility] stage [facility]) [facility]     Glaucoma     Heart failure ([facility]     chronic diastolic HF per cardiology note    Hyperlipidemia     Hypertension     Ill-defined condition     overweight BMI 27.2    PAD [facility]peripheral artery disease) [facility]     Stroke [facility]) August 2011    residual left side weakness stroke x 2 per cardiology note Past Surgical History:   Procedure Laterality Date    CARDIAC SURG PROCEDURE UNLIST      cavbg 2014 x3 VESSELS    HX CHOLECYSTECTOMY  02/2020    HX CORONARY ARTERY BYPASS GRAFT  February 2014    HX HEART CATHETERIZATION  01/24/2014    HX OTHER SURGICAL Left 06/2016    last toe on left foot amputated, for nonhealing toe ulcer    HX VASCULAR ACCESS Right Brenden Catheter    removed when fistula healed    VASCULAR SURGERY PROCEDURE UNLIST      fistula left arm       Personal factors and/or comorbidities impacting plan of care:     Home Situation  Home Environment: [address] [place] [address] Name: [employer] [facility]   Wheelchair Ramp: Yes  One/Two Story Residence: One story  Living Alone: No  Support Systems: Skilled nursing facility  Patient Expects to be Discharged to[de-identified] Skilled nursing facility  Current DME Used/Available at Home: Grab bars, Raised toilet seat, Wheelchair, Walker, rolling    EXAMINATION/PRESENTATION/DECISION MAKING:   Critical Behavior:  Neurologic State: Alert  Orientation Level: Oriented X4  Cognition: Follows commands, Decreased attention/concentration(recommend further cog screening; inconsistencies noted/STM)  Safety/Judgement: Insight into deficits, Decreased insight into deficits, Fall prevention, Awareness of environment(stopped driving ~ 2 yrs ago 2* night vision)  Hearing:   Auditory  Auditory Impairment: Hard of hearing, left side(Minnears Disease; Ringing all the time)  Skin:  intact  Edema: none  Range Of Motion:  AROM: Generally decreased, functional           PROM: Generally decreased, functional           Strength:    Strength: Generally decreased, functional                    Tone & Sensation:   Tone: Abnormal(L Ricky, WC use however pt states he was walking)                              Coordination:  Coordination: Generally decreased, functional  Vision:   Acuity: (WFL; decreased at night/stopped driving)  Corrective Lenses: Glasses  Functional Mobility:  Bed Mobility:  Rolling: Moderate assistance; Additional time  Supine to Sit: Maximum assistance; Additional time        Transfers:  Sit to Stand: Maximum assistance  Stand to Sit: Maximum assistance        Bed to Chair: Total assistance(not tolerated this session)  Lateral Transfers: Moderate assistance(side scooting to the R in bed, assist at pippa)           Balance:   Sitting: Impaired  Sitting - Static: Fair (occasional); Good (unsupported)  Sitting - Dynamic: Fair (occasional); Good (unsupported)  Standing: Impaired  Standing - Static: (have request MD orders for WBS for R LE with necrotic heel w)  Ambulation/Gait Training:           Functional Measure:  Barthel Index:    Bathin  Bladder: 0  Bowels: 5  Groomin  Dressin  Feedin  Mobility: 0  Stairs: 0  Toilet Use: 0  Transfer (Bed to Chair and Back): 0  Total: 10/100       The Barthel ADL Index: Guidelines  1. The index should be used as a record of what a patient does, not as a record of what a patient could do. 2. The main aim is to establish degree of independence from any help, physical or verbal, however minor and for whatever reason. 3. The need for supervision renders the patient not independent. 4. A patient's performance should be established using the best available evidence. Asking the patient, friends/relatives and nurses are the usual sources, but direct observation and common sense are also important. However direct testing is not needed. 5. Usually the patient's performance over the preceding 24-48 hours is important, but occasionally longer periods will be relevant. 6. Middle categories imply that the patient supplies over 50 per cent of the effort. 7. Use of aids to be independent is allowed. Debbi New., Barthel, D.W. (2874). Functional evaluation: the Barthel Index. [address] [facility] (14)2. Piper Miles juan luis DESTINI Valadez, Piedad Thakkar., Sherry Alvarado., Ayesha, [address] ().  Measuring the change indisability after inpatient rehabilitation; comparison of the responsiveness of the Barthel Index and Functional [place] Measure. Journal of Neurology, Neurosurgery, and Psychiatry, 66(4), 535-030. RANJANA Miranda, MADDI Lu, & Candelario Joy M.A. (2004.) Assessment of post-stroke quality of life in cost-effectiveness studies: The usefulness of the Barthel Index and the EuroQoL-5D. Quality of Life Research, 15, 234-48        Physical Therapy Evaluation Charge Determination   History Examination Presentation Decision-Making   HIGH Complexity :3+ comorbidities / personal factors will impact the outcome/ POC  HIGH Complexity : 4+ Standardized tests and measures addressing body structure, function, activity limitation and / or participation in recreation  LOW Complexity : Stable, uncomplicated  Other outcome measures barthel  HIGH       Based on the above components, the patient evaluation is determined to be of the following complexity level: LOW     Pain Rating: Moderate pain    Activity Tolerance:   requires frequent rest breaks  Please refer to the flowsheet for vital signs taken during this treatment. After treatment patient left in no apparent distress:   Supine in bed and Call bell within reach    COMMUNICATION/EDUCATION:   The patients plan of care was discussed with: Registered nurse and Case management. Fall prevention education was provided and the patient/caregiver indicated understanding. and Patient/family have participated as able in goal setting and plan of care.     Thank you for this referral.  Travis Servin, PT   Time Calculation: 20 mins

## 2020-07-22 NOTE — DIALYSIS
TRANSFER - IN REPORT:    Verbal report received from MATHEUS Finley RN (name) on Adelaida Watson  being received from [address] [facility] (unit) for ordered procedure      Report consisted of patients Situation, Background, Assessment and   Recommendations(SBAR). Information from the following report(s) SBAR was reviewed with the receiving nurse. Opportunity for questions and clarification was provided. Assessment completed upon patients arrival to unit and care assumed.

## 2020-07-22 NOTE — PROGRESS NOTES
Bedside shift change report given to Armando Lam RN and Braeden Hidalgo RN (oncoming nurse) by LILA Cohen (offgoing nurse). Report included the following information SBAR, Kardex, Intake/Output and MAR.

## 2020-07-22 NOTE — CONSULTS
Nephrology Progress Note  Rosalia North  Date of Admission : 7/21/2020    CC: Follow up for ESRD       Assessment and Plan     ESRD on HD:  - MWF at [facility]  - HD today    HTN    Anemia of CKD:  - ARSALAN w/ HD    Secondary HPT:  - on phoslo w/ meals    DM2:  - on insulin    PAD s/p L BKA       Interval History: This is a 76 yo AAM w/ a hx of ESRD MWF, admitted for LLE wound. He underwent a L BKA yesterday. Due for HD today. He feels ok. C/o post op pain. BP stable. No cp, sob, n/v/d reported at this time. Current Medications: all current  Medications have been eviewed in EPIC  Review of Systems: Pertinent items are noted in HPI. Objective:  Vitals:    Vitals:    07/21/20 2110 07/22/20 0019 07/22/20 0352 07/22/20 0755   BP: 133/81 149/78 124/63 118/63   Pulse: 75 90 94 100   Resp:  18 18 16   Temp:  97.8 °F (36.6 °C) 97.7 °F (36.5 °C) 97.5 °F (36.4 °C)   SpO2:  98% 96% 100%   Weight:         Intake and Output:  No intake/output data recorded. 07/20 1901 - 07/22 0700  In: 200 [I.V.:200]  Out: -     Physical Examination:  General: NAD,Conversant   Neck:  Supple, no mass  Resp:  Lungs CTA B/L, no wheezing , normal respiratory effort  CV:  RRR,  no murmur or rub, no LE edema, L BKA  GI:  Soft, NT, + Bowel sounds, no hepatosplenomegaly  Neurologic:  Non focal  Psych:             AAO x 3 appropriate affect   Skin:  No Rash  Access:           LUE AVF +thrill/bruit    []    High complexity decision making was performed  []    Patient is at high-risk of decompensation with multiple organ involvement    Lab Data Personally Reviewed: I have reviewed all the pertinent labs, microbiology data and radiology studies during assessment.     Recent Labs     07/22/20  0550 07/21/20  0650     --    K 4.0 3.5   CL 98  --    CO2 27  --    *  --    BUN 49*  --    CREA 8.44*  --    CA 9.0  --      Recent Labs     07/22/20  0550   WBC 9.6   HGB 9.1*   HCT 29.1*        No results found for: SDES  Lab Results   Component Value Date/Time    Culture result: SCANT  MIXED SKIN MAHESH ISOLATED   03/17/2017 03:08 PM    Culture result: NO ANAEROBES ISOLATED 03/17/2017 03:08 PM    Culture result: NO GROWTH 2 DAYS 06/24/2016 10:25 PM     Recent Results (from the past 24 hour(s))   GLUCOSE, POC    Collection Time: 07/21/20  4:34 PM   Result Value Ref Range    Glucose (POC) 182 (H) 65 - 100 mg/dL    Performed by Zaki Gong    GLUCOSE, POC    Collection Time: 07/21/20  8:48 PM   Result Value Ref Range    Glucose (POC) 210 (H) 65 - 100 mg/dL    Performed by Emma 106, POC    Collection Time: 07/21/20  9:40 PM   Result Value Ref Range    Glucose (POC) 235 (H) 65 - 100 mg/dL    Performed by Sindhu Kimble    METABOLIC PANEL, BASIC    Collection Time: 07/22/20  5:50 AM   Result Value Ref Range    Sodium 136 136 - 145 mmol/L    Potassium 4.0 3.5 - 5.1 mmol/L    Chloride 98 97 - 108 mmol/L    CO2 27 21 - 32 mmol/L    Anion gap 11 5 - 15 mmol/L    Glucose 214 (H) 65 - 100 mg/dL    BUN 49 (H) 6 - 20 MG/DL    Creatinine 8.44 (H) 0.70 - 1.30 MG/DL    BUN/Creatinine ratio 6 (L) 12 - 20      GFR est AA 8 (L) >60 ml/min/1.73m2    GFR est non-AA 6 (L) >60 ml/min/1.73m2    Calcium 9.0 8.5 - 10.1 MG/DL   CBC WITH AUTOMATED DIFF    Collection Time: 07/22/20  5:50 AM   Result Value Ref Range    WBC 9.6 4.1 - 11.1 K/uL    RBC 2.95 (L) 4.10 - 5.70 M/uL    HGB 9.1 (L) 12.1 - 17.0 g/dL    HCT 29.1 (L) 36.6 - 50.3 %    MCV 98.6 80.0 - 99.0 FL    MCH 30.8 26.0 - 34.0 PG    MCHC 31.3 30.0 - 36.5 g/dL    RDW 15.1 (H) 11.5 - 14.5 %    PLATELET 379 385 - 849 K/uL    MPV 9.9 8.9 - 12.9 FL    NRBC 0.0 0  WBC    ABSOLUTE NRBC 0.00 0.00 - 0.01 K/uL    NEUTROPHILS 79 (H) 32 - 75 %    LYMPHOCYTES 11 (L) 12 - 49 %    MONOCYTES 7 5 - 13 %    EOSINOPHILS 3 0 - 7 %    BASOPHILS 0 0 - 1 %    IMMATURE GRANULOCYTES 0 0.0 - 0.5 %    ABS. NEUTROPHILS 7.6 1.8 - 8.0 K/UL    ABS. LYMPHOCYTES 1.1 0.8 - 3.5 K/UL    ABS.  MONOCYTES 0.6 0.0 - 1.0 K/UL    ABS. EOSINOPHILS 0.2 0.0 - 0.4 K/UL    ABS. BASOPHILS 0.0 0.0 - 0.1 K/UL    ABS. IMM. GRANS. 0.0 0.00 - 0.04 K/UL    DF AUTOMATED     GLUCOSE, POC    Collection Time: 07/22/20  6:23 AM   Result Value Ref Range    Glucose (POC) 181 (H) 65 - 100 mg/dL    Performed by Harvinder Bailey MD  [address]  Phone - [phone]   Fax - [phone]  [email or web address]

## 2020-07-22 NOTE — PROGRESS NOTES
I agree with the charting completed by Ora Haynes RN. Bedside and Verbal shift change report given to Marisa Cotnreras RN (oncoming nurse) by Cody Torres RN (offgoing nurse). Report included the following information SBAR, Kardex, Procedure Summary, Intake/Output, MAR and Recent Results.

## 2020-07-22 NOTE — PROGRESS NOTES
Physical Therapy orders received, evaluated, full note to follow. Please specify weight bearing for R foot due to black pressure wound on heel.      [place], DPT, PT

## 2020-07-22 NOTE — PROGRESS NOTES
Problem: Self Care Deficits Care Plan (Adult)  Goal: *Acute Goals and Plan of Care (Insert Text)  Description:   FUNCTIONAL STATUS PRIOR TO ADMISSION: was in SNF level rehab since Feb 2020 post toe amputations (twice); [place] [facility] [place] with ADLs-min-mod A as well as functional mobility, eg not toileting by himself; not a great historian with widely varying level of assist reported since CVA in 2011; appears had returned to driving and work for several years but thinks he moved to [facility] ~ 2 yrs ago; has used w/c since 2-2020 when L toe amputations were initiated    HOME SUPPORT: The patient lived with SNF x 2 yrs per his report with rehab only since Feb 2020. Dialysis MWF    Occupational Therapy Goals  Initiated 7/22/2020  1. Patient will perform bathing with minimal assistance/contact guard assist within 7 day(s). 2.  Patient will perform upper body dressing with supervision/set-up within 7 day(s). 3.  Patient will perform lower body dressing with moderate assistance  within 7 day(s). 4.  Patient will perform toilet transfers with moderate assistance  within 7 day(s). 5.  Patient will perform all aspects of toileting with moderate assistance  within 7 day(s). 6.  Patient will participate in upper extremity therapeutic exercise/activities with supervision/set-up for 5 minutes within 7 day(s). 7.  Patient will utilize energy conservation, PLB, fall prevention, BKA desensitization and positioning techniques during functional activities with verbal, visual and tactile cues within 7 day(s).            Outcome: Progressing Towards Goal   OCCUPATIONAL THERAPY EVALUATION  Patient: Tori Ortiz (49 y.o. 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 (L shoulder limited as well, CVA 2011) Patient cooperative but is inconsistent historian with his complex medical history. May benefit from further cog screen for clarification. Pain reported 7/10 L LE but unable to tolerate supine to sit due to pain. Pain meds requested during session. Current Level of Function Impacting Discharge (ADLs/self-care): set up to min A UE ADLs, Max A-D LE ADLs, Min A bed mobility to L, Max A-D to R; unable to tolerate further functional mobility tasks at current pain level    Functional Outcome Measure: The patient scored Total: 10/100 on the Barthel Index outcome measure which is indicative of 90% impaired ability to care for basic self needs/dependency on others; inferred 100% dependency on others for instrumental ADLs. Other factors to consider for discharge: was in SNF level rehab PTA     Patient will benefit from skilled therapy intervention to address the above noted impairments. PLAN :  Recommendations and Planned Interventions: self care training, functional mobility training, therapeutic exercise, balance training, therapeutic activities, cognitive retraining, endurance activities, neuromuscular re-education, patient education, home safety training, and family training/education    Frequency/Duration: Patient will be followed by occupational therapy 5 times a week to address goals.     Recommendation for discharge: (in order for the patient to meet his/her long term goals)  Therapy up to 5 days/week in SNF setting    This discharge recommendation:  A follow-up discussion with the attending provider and/or case management is planned    IF patient discharges home will need the following DME: TBA; has w/c from [address] [employer] but not personal w/c       SUBJECTIVE:   Patient stated I have been using w/c since they removed toes the second time; the first time I was walking with a cane.     OBJECTIVE DATA SUMMARY:   HISTORY:   Past Medical History:   Diagnosis Date    CAD (coronary artery disease)      2014, MI, CABG    Chronic kidney disease     on dialysis MWF    Diabetes ([address]     ESRD (end stage renal disease) ([address]     Glaucoma     Heart failure ([address]     chronic diastolic HF per cardiology note    Hyperlipidemia     Hypertension     Ill-defined condition     overweight BMI 27.2    PAD (peripheral artery disease) ([address]     Stroke [facility]) August 2011    residual left side weakness stroke x 2 per cardiology note     Past Surgical History:   Procedure Laterality Date    CARDIAC SURG PROCEDURE UNLIST      cavbg 2014 x3 VESSELS    HX CHOLECYSTECTOMY  02/2020    HX CORONARY ARTERY BYPASS GRAFT  February 2014    HX HEART CATHETERIZATION  01/24/2014    HX OTHER SURGICAL Left 06/2016    last toe on left foot amputated, for nonhealing toe ulcer    HX VASCULAR ACCESS Right Brenden Catheter    removed when fistula healed    VASCULAR SURGERY PROCEDURE UNLIST      fistula left arm       Expanded or extensive additional review of patient history:     Home Situation  Home Environment: (Lives at [facility] ~2yrs; recent SNF post op 2-2020)  Wheelchair Ramp: Yes  One/Two Story Residence: One story  Living Alone: No(in SNF PTA)  Support Systems: Skilled nursing facility, Family member(s)  Patient Expects to be Discharged to[de-identified] Skilled nursing facility  Current DME Used/Available at Home: Wheelchair, Grab bars, Raised toilet seat    Hand dominance: Right    EXAMINATION OF PERFORMANCE DEFICITS:  Cognitive/Behavioral Status:  Neurologic State: Alert  Orientation Level: Oriented X4  Cognition: Follows commands;Decreased attention/concentration(recommend further cog screening; inconsistencies noted/STM)  Perception: Appears intact  Perseveration: No perseveration noted  Safety/Judgement: Insight into deficits; Decreased insight into deficits; Fall prevention; Awareness of environment(stopped driving ~ 2 yrs ago 2* night vision)    Skin: see nsg and wound care notes; R heel floated; L BKA dressed at this time with no limb protector present    Edema: see nsg nots; Post op Residual limb edema    Hearing: Auditory  Auditory Impairment: Hard of hearing, left side(Minnears Disease; Ringing all the time)    Vision/Perceptual:                           Acuity: (WFL; decreased at night/stopped driving)    Corrective Lenses: Glasses    Range of Motion:    AROM: Generally decreased, functional(L UE limited to 90 decreased shoulder flex, 80* shoulder abd)  PROM: Generally decreased, functional(L shoulder limited to ~ 90 degrees with increased tone; CVA )                      Strength:  Strength: Generally decreased, functional(limited L shoulder, hip and knee)  Intolerant of sitting up this session despite strong effort: core weakness vs Pain related/combination of both                Coordination:  Coordination: Generally decreased, functional(except L shoulder limited proximally and L LE limited)  Fine Motor Skills-Upper: Left Intact; Right Intact    Gross Motor Skills-Upper: Left Impaired;Right Intact    Tone & Sensation:  Tone: Abnormal(increased tone L UE and LE; CVA 2011; in w/c since 2-2020 ) post LE surgery                         Balance:  Sitting: Impaired  Sitting - Static: (unable to tolerate sitting edge of bed 2* L LE pain)  Standing: Impaired  Standing - Static: (have request MD orders for WBS for R LE with necrotic heel w)    Functional Mobility and Transfers for ADLs:  Bed Mobility:  Rolling: Moderate assistance; Additional time; Adaptive equipment(S to L with rails; max A to R with rails)  Supine to Sit: Maximum assistance; Total assistance; Additional time(limited 2* 7/10 report L BKA P!; unable to come to full sit)    Transfers:  Bed to Chair: Total assistance(not tolerated this session)  Toilet Transfer : Total assistance(not attempted this session secondary to pain; WBS pending)    ADL Assessment:  Feeding: Setup    Oral Facial Hygiene/Grooming: Setup; Additional time    Bathing: Maximum assistance; Additional time    Upper Body Dressing: Minimum assistance; Additional time    Lower Body Dressing: Maximum assistance; Total assistance(attemtped tasks, used crossed leg method PTA)    Toileting: Total assistance(inferred)                ADL Intervention and task modifications:     Initiated training of fall prevention, need to tolerate out of bed activity, need to ask for pain meds, need to ask for assistance to bathroom, need for desensitization with BKA, infection control strategies buddy handwashing; initiated training of PLB for O2 weaning as he did not use PTA    Cognitive Retraining  Safety/Judgement: Insight into deficits; Decreased insight into deficits; Fall prevention; Awareness of environment(stopped driving ~ 2 yrs ago 2* night vision)      Functional Measure:  Barthel Index:    Bathin  Bladder: 0  Bowels: 5  Groomin  Dressin  Feedin  Mobility: 0  Stairs: 0  Toilet Use: 0  Transfer (Bed to Chair and Back): 0  Total: 10/100        The Barthel ADL Index: Guidelines  1. The index should be used as a record of what a patient does, not as a record of what a patient could do. 2. The main aim is to establish degree of independence from any help, physical or verbal, however minor and for whatever reason. 3. The need for supervision renders the patient not independent. 4. A patient's performance should be established using the best available evidence.  Asking the patient, friends/relatives and nurses are the usual sources, but direct observation and common sense are also important. However direct testing is not needed. 5. Usually the patient's performance over the preceding 24-48 hours is important, but occasionally longer periods will be relevant. 6. Middle categories imply that the patient supplies over 50 per cent of the effort. 7. Use of aids to be independent is allowed. Brandon Orlando., Barthel, D.W. (1127). Functional evaluation: the Barthel Index. [facility] (14)2. DESTINI Hammonds, Lizandro Oglesby., Jr Alvarado., Ayesha, [facility] (). Measuring the change indisability after inpatient rehabilitation; comparison of the responsiveness of the Barthel Index and Functional [place] Measure. Journal of Neurology, Neurosurgery, and Psychiatry, 66(4), 869-994. RANJANA Morales, MADDI Lu, & Kasandra Carter M.A. (2004.) Assessment of post-stroke quality of life in cost-effectiveness studies: The usefulness of the Barthel Index and the EuroQoL-5D. Quality of Life Research, 15, 328-96         Occupational Therapy Evaluation Charge Determination   History Examination Decision-Making   LOW Complexity : Brief history review  HIGH Complexity : 5 or more performance deficits relating to physical, cognitive , or psychosocial skils that result in activity limitations and / or participation restrictions HIGH Complexity : Patient presents with comorbidities that affect occupational performance. Signifigant modification of tasks or assistance (eg, physical or verbal) with assessment (s) is necessary to enable patient to complete evaluation       Based on the above components, the patient evaluation is determined to be of the following complexity level: LOW   Pain Ratin/10, nsg notified with meds requested    Activity Tolerance:   Fair, Poor, and desaturates with exertion and requires oxygen  Please refer to the flowsheet for vital signs taken during this treatment.     After treatment patient left in no apparent distress:    Supine in bed, Heels elevated for pressure relief, Call bell within reach, and Side rails x 3    COMMUNICATION/EDUCATION:   The patients plan of care was discussed with: Physical therapist, Registered nurse, and wound care . Home safety education was provided and the patient/caregiver indicated understanding., Patient/family have participated as able in goal setting and plan of care. , and Patient/family agree to work toward stated goals and plan of care. This patients plan of care is appropriate for delegation to [facility].     Thank you for this referral.  Daisy Monsalve OTR/L  Time Calculation: 46 mins

## 2020-07-22 NOTE — CDMP QUERY
Patient admitted with DM type 2 with peripheral artery disease, noted to have R heel wound on H&P and WOCN consult on 7/22.  If possible, please document in progress notes and discharge summary the following regarding the ulcer:     TYPE of Ulcer (Decubitus, Diabetic, Venous stasis, other)   SITE of Ulcer (Including laterality, if applicable)   STAGE/DEPTH of Ulcer (If applicable)   POA Status--Present on Admission (POA) or Hospital Acquired    Other, please specify   Clinically unable to determine    The medical record reflects the following:    Risk Factors: DM, ESRD    Clinical Indicators: 7/22 WOCN- POA Right heel, unstageable Pressure Injury    Treatment: WOCN consult, daily lightly paint with betadine and allow to dry- keep open to air, offload heels, monitoring       Thank you,    Leonides Godinez RN  [facility]  [phone]
- - -

## 2020-07-22 NOTE — DIALYSIS
[facility] Dialysis Team [facility]  [phone]    Vitals   Pre   Post   Assessment   Pre   Post     Temp  Temp: 98.1 °F (36.7 °C) (07/22/20 1545)  98.4 LOC  Alert, pleasant cooperative  Alert, pleasant cooperative   HR   Pulse (Heart Rate): 88 (07/22/20 1545) 113 Lungs   Clear to auscultation  unlabored, even    B/P   BP: 109/58 (07/22/20 1545) 144/72 Cardiac   RRR  RRR   Resp   Resp Rate: 16 (07/22/20 1545) 16 Skin   Warm and dry   warm and dry    Pain level  Pain Intensity 1: 0 (07/22/20 0100) Denies any pain when asked Edema    Trace rt lower leg   Trace rt lower leg   Orders:    Duration:   Start:    1545 End:    1915 Total:   3.5   Dialyzer:   Dialyzer/Set Up Inspection: Nabor Headley (07/22/20 1545)   K Bath:   Dialysate K (mEq/L): 2 (07/22/20 1545)   Ca Bath:   Dialysate CA (mEq/L): 2.5 (07/22/20 1545)   Na/Bicarb:   Dialysate NA (mEq/L): 140 (07/22/20 1545)   Target Fluid Removal:   Goal/Amount of Fluid to Remove (mL): 2500 mL (07/22/20 1545)   Access     Type & Location:   HARSHA AV fistula, no redness or drainage, scabs from healing cannulation sites,    Labs     Obtained/Reviewed   Critical Results Called   Date when labs were drawn-  Hgb-    HGB   Date Value Ref Range Status   07/22/2020 9.1 (L) 12.1 - 17.0 g/dL Final     K-    Potassium   Date Value Ref Range Status   07/22/2020 4.0 3.5 - 5.1 mmol/L Final     Ca-   Calcium   Date Value Ref Range Status   07/22/2020 9.0 8.5 - 10.1 MG/DL Final     Bun-   BUN   Date Value Ref Range Status   07/22/2020 49 (H) 6 - 20 MG/DL Final     Creat-   Creatinine   Date Value Ref Range Status   07/22/2020 8.44 (H) 0.70 - 1.30 MG/DL Final     Comment:     INVESTIGATED PER DELTA CHECK PROTOCOL        Medications/ Blood Products Given     Name   Dose   Route and Time                     Blood Volume Processed (BVP):    75 Net Fluid   Removed:  1500   Comments   Time Out Done: 1540  Primary Nurse Rpt Pre: Tequila Garcia RN   Primary Nurse Rpt Post: MATHEUS Finley RN Pt Education: procedural/informed consent  Care Plan continue current HD plan of care   Tx Summary:  [phone] 46 67 HD initiated as ordered. 1615 uf decreased for trending blood pressure. 1915 treatment completed all possible blood returned, hemostasis achieved <10 minutes, + thrill, dressing clean, dry and intact. SBAR at bedside with primary nurse. Admiting Diagnosis: right BKA   Pt's previous clinic- [address]  Consent signed - Informed Consent Verified: Yes (07/22/20 1545)  Ananya Consent - obtained   Hepatitis Status- 01/15/20 antigen negative antibodies immune  Machine #- Machine Number: [id number] (07/22/20 1545)  Telemetry status-n/a  Pre-dialysis wt. -

## 2020-07-23 LAB
GLUCOSE BLD STRIP.AUTO-MCNC: 162 MG/DL (ref 65–100)
GLUCOSE BLD STRIP.AUTO-MCNC: 184 MG/DL (ref 65–100)
GLUCOSE BLD STRIP.AUTO-MCNC: 204 MG/DL (ref 65–100)
GLUCOSE BLD STRIP.AUTO-MCNC: 208 MG/DL (ref 65–100)
SARS-COV-2, COV2: NOT DETECTED
SERVICE CMNT-IMP: ABNORMAL
SOURCE, COVRS: NORMAL
SPECIMEN SOURCE, FCOV2M: NORMAL

## 2020-07-23 PROCEDURE — 82962 GLUCOSE BLOOD TEST: CPT

## 2020-07-23 PROCEDURE — 74011250636 HC RX REV CODE- 250/636

## 2020-07-23 PROCEDURE — 74011636637 HC RX REV CODE- 636/637

## 2020-07-23 PROCEDURE — 97535 SELF CARE MNGMENT TRAINING: CPT

## 2020-07-23 PROCEDURE — 87635 SARS-COV-2 COVID-19 AMP PRB: CPT

## 2020-07-23 PROCEDURE — 65270000029 HC RM PRIVATE

## 2020-07-23 PROCEDURE — 74011250637 HC RX REV CODE- 250/637

## 2020-07-23 PROCEDURE — 97530 THERAPEUTIC ACTIVITIES: CPT

## 2020-07-23 RX ADMIN — INSULIN LISPRO 2 UNITS: 100 INJECTION, SOLUTION INTRAVENOUS; SUBCUTANEOUS at 21:44

## 2020-07-23 RX ADMIN — Medication 2001 MG: at 17:06

## 2020-07-23 RX ADMIN — Medication 10 ML: at 06:00

## 2020-07-23 RX ADMIN — INSULIN GLARGINE 17 UNITS: 100 INJECTION, SOLUTION SUBCUTANEOUS at 21:44

## 2020-07-23 RX ADMIN — HEPARIN SODIUM 5000 UNITS: 5000 INJECTION INTRAVENOUS; SUBCUTANEOUS at 07:21

## 2020-07-23 RX ADMIN — HYDROCODONE BITARTRATE AND ACETAMINOPHEN 1 TABLET: 7.5; 325 TABLET ORAL at 17:06

## 2020-07-23 RX ADMIN — TAMSULOSIN HYDROCHLORIDE 0.4 MG: 0.4 CAPSULE ORAL at 21:45

## 2020-07-23 RX ADMIN — HYDROCODONE BITARTRATE AND ACETAMINOPHEN 1 TABLET: 7.5; 325 TABLET ORAL at 00:06

## 2020-07-23 RX ADMIN — ISOSORBIDE MONONITRATE 60 MG: 60 TABLET, EXTENDED RELEASE ORAL at 21:45

## 2020-07-23 RX ADMIN — INSULIN LISPRO 2 UNITS: 100 INJECTION, SOLUTION INTRAVENOUS; SUBCUTANEOUS at 17:07

## 2020-07-23 RX ADMIN — Medication 10 ML: at 22:00

## 2020-07-23 RX ADMIN — INSULIN LISPRO 2 UNITS: 100 INJECTION, SOLUTION INTRAVENOUS; SUBCUTANEOUS at 11:30

## 2020-07-23 RX ADMIN — ATORVASTATIN CALCIUM 40 MG: 40 TABLET, FILM COATED ORAL at 10:02

## 2020-07-23 RX ADMIN — HEPARIN SODIUM 5000 UNITS: 5000 INJECTION INTRAVENOUS; SUBCUTANEOUS at 14:49

## 2020-07-23 RX ADMIN — TIMOLOL MALEATE 1 DROP: 2.5 SOLUTION/ DROPS OPHTHALMIC at 10:03

## 2020-07-23 RX ADMIN — CLOPIDOGREL BISULFATE 75 MG: 75 TABLET ORAL at 10:02

## 2020-07-23 RX ADMIN — Medication 2001 MG: at 07:22

## 2020-07-23 RX ADMIN — Medication 10 ML: at 14:49

## 2020-07-23 RX ADMIN — HYDROCODONE BITARTRATE AND ACETAMINOPHEN 1 TABLET: 7.5; 325 TABLET ORAL at 11:19

## 2020-07-23 RX ADMIN — LATANOPROST 1 DROP: 50 SOLUTION OPHTHALMIC at 22:00

## 2020-07-23 RX ADMIN — HEPARIN SODIUM 5000 UNITS: 5000 INJECTION INTRAVENOUS; SUBCUTANEOUS at 21:38

## 2020-07-23 RX ADMIN — INSULIN LISPRO 3 UNITS: 100 INJECTION, SOLUTION INTRAVENOUS; SUBCUTANEOUS at 07:22

## 2020-07-23 RX ADMIN — DOCUSATE SODIUM 100 MG: 100 CAPSULE, LIQUID FILLED ORAL at 10:02

## 2020-07-23 RX ADMIN — Medication 2001 MG: at 12:00

## 2020-07-23 RX ADMIN — DOCUSATE SODIUM 100 MG: 100 CAPSULE, LIQUID FILLED ORAL at 17:06

## 2020-07-23 RX ADMIN — TIMOLOL MALEATE 1 DROP: 2.5 SOLUTION/ DROPS OPHTHALMIC at 17:08

## 2020-07-23 RX ADMIN — TRAZODONE HYDROCHLORIDE 150 MG: 100 TABLET ORAL at 21:44

## 2020-07-23 NOTE — PROGRESS NOTES
Bedside shift change report given to [facility] LILA MI (oncoming nurse) by Amina Mantilla RN (offgoing nurse). Report included the following information SBAR, Kardex, Intake/Output and MAR.

## 2020-07-23 NOTE — PROGRESS NOTES
Vascular:    Doing well    Incision OK, knee wants to contract - immobilizer placed    Plan DC to SNF tomorrow    He has an unstageable right heel decubitus wound present on admission - continue wound care

## 2020-07-23 NOTE — PROGRESS NOTES
Problem: Mobility Impaired (Adult and Pediatric)  Goal: *Acute Goals and Plan of Care (Insert Text)  Description: FUNCTIONAL STATUS PRIOR TO ADMISSION: The patient was functional at the wheelchair level and required moderate assistance for transfers to the chair. Pt states that he was walking with therapy however unclear if this is accurate    HOME SUPPORT PRIOR TO ADMISSION: The patient lived with staff at [address] [place] [address]. Physical Therapy Goals  Initiated 7/22/2020  1. Patient will move from supine to sit and sit to supine , scoot up and down, and roll side to side in bed with supervision/set-up within 7 day(s). 2.  Patient will transfer from bed to chair and chair to bed with min a/supervision/set-up using the least restrictive device or transfer board within 7 day(s). 3.  Patient will perform sit to stand with moderate assistance  within 7 day(s). 4.  Patient will tolerate sitting without back support for 30 minutes within 7 day(s). Outcome: Progressing Towards Goal    PHYSICAL THERAPY TREATMENT  Patient: Art Loza (76 y.o. 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 ASSESSMENT  Patient continues with skilled PT services and is progressing towards goals. Intervention is slowed by required increase in processing time and difficulty transitioning from one task to another. He required maximum assist to transfer to EOB but was able to maintain sitting balance with CGA-supervision. Attempted scooting right to [facility] but patient challenged with offloading his buttocks and sequencing to do so. Completed seated exercises before returning supine and the patient demonstrated difficulty transitioning from transfers to exercise and back. Recommend transition back to [facility] with [facility] level rehab when medically ready. PLAN :  Patient continues to benefit from skilled intervention to address the above impairments. Continue treatment per established plan of care. to address goals. Recommendation for discharge: (in order for the patient to meet his/her long term goals)  Therapy up to 5 days/week in SNF setting    This discharge recommendation:  Has been made in collaboration with the attending provider and/or case management    IF patient discharges home will need the following DME: patient owns DME required for discharge       SUBJECTIVE:   Patient stated Okay. I can try.     OBJECTIVE DATA SUMMARY:   Critical Behavior:  Neurologic State: Alert  Orientation Level: Appropriate for age, Oriented to person, Oriented to place, Oriented to situation  Cognition: Follows commands, Decreased attention/concentration, Impaired decision making  Safety/Judgement: Decreased insight into deficits, Insight into deficits, Decreased awareness of need for safety, Fall prevention  Functional Mobility Training:  Bed Mobility:  Rolling: Moderate assistance  Supine to Sit: Maximum assistance     Scooting: Maximum assistance; Total assistance        Transfers:     Balance:  Sitting: Impaired  Sitting - Static: Fair (occasional)  Sitting - Dynamic: Fair (occasional)    Therapeutic Exercises: Ankle pumps, LAQs, marches right x10, left quad sets in limb guard  Pain Rating:      Activity Tolerance:   Fair  Please refer to the flowsheet for vital signs taken during this treatment. After treatment patient left in no apparent distress:   Call bell within reach    COMMUNICATION/COLLABORATION:   The patients plan of care was discussed with: Registered nurse.      Orvis Bound, PT, DPT   Time Calculation: 13 mins

## 2020-07-23 NOTE — PROGRESS NOTES
Nephrology Progress Note  Peter Griffin  Date of Admission : 7/21/2020    CC: Follow up for ESRD       Assessment and Plan     ESRD on HD:  - MWF at [facility]  - HD early AM then d/c    HTN    Anemia of CKD:  - ARSALAN w/ HD    Secondary HPT:  - on phoslo w/ meals    DM2:  - on insulin    PAD s/p L BKA       Interval History:  Seen and examined. Feeling ok. Pain controlled. No issues w/ HD yesterday. No cp, sob, n/v/d reported. Current Medications: all current  Medications have been eviewed in EPIC  Review of Systems: Pertinent items are noted in HPI. Objective:  Vitals:    Vitals:    07/22/20 1915 07/22/20 2107 07/23/20 0013 07/23/20 0751   BP: 144/72 121/64 136/72 134/64   Pulse: (!) 113 (!) 116 (!) 113 94   Resp: 16  18 18   Temp: 98.4 °F (36.9 °C)  98.1 °F (36.7 °C) 98.5 °F (36.9 °C)   TempSrc: Oral      SpO2:   97% 96%   Weight:         Intake and Output:  No intake/output data recorded. 07/21 1901 - 07/23 0700  In: -   Out: 1500     Physical Examination:  General: NAD,Conversant   Neck:  Supple, no mass  Resp:  Lungs CTA B/L, no wheezing , normal respiratory effort  CV:  RRR,  no murmur or rub, no LE edema, L BKA  GI:  Soft, NT, + Bowel sounds, no hepatosplenomegaly  Neurologic:  Non focal  Psych:             AAO x 3 appropriate affect   Skin:  No Rash  Access:           LUE AVF +thrill/bruit    []    High complexity decision making was performed  []    Patient is at high-risk of decompensation with multiple organ involvement    Lab Data Personally Reviewed: I have reviewed all the pertinent labs, microbiology data and radiology studies during assessment.     Recent Labs     07/22/20  0550 07/21/20  0650     --    K 4.0 3.5   CL 98  --    CO2 27  --    *  --    BUN 49*  --    CREA 8.44*  --    CA 9.0  --      Recent Labs     07/22/20  0550   WBC 9.6   HGB 9.1*   HCT 29.1*        No results found for: SDES  Lab Results   Component Value Date/Time    Culture result: SCANT  MIXED SKIN MAHESH ISOLATED   03/17/2017 03:08 PM    Culture result: NO ANAEROBES ISOLATED 03/17/2017 03:08 PM    Culture result: NO GROWTH 2 DAYS 06/24/2016 10:25 PM     Recent Results (from the past 24 hour(s))   GLUCOSE, POC    Collection Time: 07/22/20  6:48 PM   Result Value Ref Range    Glucose (POC) 165 (H) 65 - 100 mg/dL    Performed by Gail Cole    GLUCOSE, POC    Collection Time: 07/22/20  8:40 PM   Result Value Ref Range    Glucose (POC) 227 (H) 65 - 100 mg/dL    Performed by Emma 106, POC    Collection Time: 07/23/20  6:32 AM   Result Value Ref Range    Glucose (POC) 204 (H) 65 - 100 mg/dL    Performed by Ivania Bob    GLUCOSE, POC    Collection Time: 07/23/20 11:14 AM   Result Value Ref Range    Glucose (POC) 184 (H) 65 - 100 mg/dL    Performed by Jesus Griffith MD  [address]  Phone - [phone]   Fax - [phone]  [email or web address]

## 2020-07-23 NOTE — PROGRESS NOTES
Bedside and Verbal shift change report given to Ronna Lance RN (oncoming nurse) by Haritha Martinez RN (offgoing nurse). Report included the following information SBAR, Kardex, Procedure Summary, Intake/Output, MAR and Recent Results.

## 2020-07-23 NOTE — PROGRESS NOTES
Problem: Self Care Deficits Care Plan (Adult)  Goal: *Acute Goals and Plan of Care (Insert Text)  Description:   FUNCTIONAL STATUS PRIOR TO ADMISSION: was in SNF level rehab since Feb 2020 post toe amputations (twice); Reports [facility] staff assisting with ADLs-min-mod A as well as functional mobility, eg not toileting by himself; not a great historian with widely varying level of assist reported since CVA in 2011; appears had returned to driving and work for several years but thinks he moved to [facility] ~ 2 yrs ago; has used w/c since 2-2020 when L toe amputations were initiated    HOME SUPPORT: The patient lived with SNF x 2 yrs per his report with rehab only since Feb 2020. Occupational Therapy Goals  Initiated 7/22/2020  1. Patient will perform bathing with minimal assistance/contact guard assist within 7 day(s). 2.  Patient will perform upper body dressing with supervision/set-up within 7 day(s). 3.  Patient will perform lower body dressing with moderate assistance  within 7 day(s). 4.  Patient will perform toilet transfers with moderate assistance  within 7 day(s). 5.  Patient will perform all aspects of toileting with moderate assistance  within 7 day(s). 6.  Patient will participate in upper extremity therapeutic exercise/activities with supervision/set-up for 5 minutes within 7 day(s). 7.  Patient will utilize energy conservation, PLB, fall prevention, BKA desensitization and positioning techniques during functional activities with verbal, visual and tactile cues within 7 day(s).            Outcome: Progressing Towards Goal   OCCUPATIONAL THERAPY TREATMENT  Patient: Janet Oconnor (47 y.o. male)  Date: 7/23/2020  Diagnosis: Atherosclerosis of native arteries of the extremities with gangrene ([facility] [address] [I70.269]  Atherosclerosis of native arteries of the extremities with gangrene ([facility] [address] [I70.269]   <principal problem not specified>  Procedure(s) (LRB):  LEFT BELOW KNEE AMPUTATION (BKA) (Left) 2 Days Post-Op  Precautions: Fall(R 98% black pressure wound on heel; Dialy: MWF)  Chart, occupational therapy assessment, plan of care, and goals were reviewed. ASSESSMENT  Patient continues with skilled OT services and is progressing towards goals. Patient with post op BKA pain 0/10 at rest; 10/10 with AROM; worst with tapping posterior residual limb with desensitization tasks;   Current Level of Function Impacting Discharge (ADLs): Max A-D management of knee immobilizer don/doff and desensitization skills; Visual skills, which he reported yesterday as no change/no problems clearly limited in L field as he was unable to manage straps on L side of brace at all with obvious visual deficits in addidtion to poor L hand coordination with this unfamiliar task    Other factors to consider for discharge: he reports DELICIA/LTC at the [address], but SNF since Feb this year         PLAN :  Patient continues to benefit from skilled intervention to address the above impairments. Continue treatment per established plan of care. to address goals. Recommend with staff: close S of knee immobilizer due to pressure from straps as he tends to rest in abnormal knee flexion related to [address] CVA/L kevin    Recommend next OT session: LE dressing with scrubs/rolling side to side; bridging    Recommendation for discharge: (in order for the patient to meet his/her long term goals)  Therapy up to 5 days/week in SNF setting    This discharge recommendation:  Has been made in collaboration with the attending provider and/or case management    IF patient discharges home will need the following DME: TBA in rehab       SUBJECTIVE:   Patient stated Maribeth Perez is my brother    OBJECTIVE DATA SUMMARY:   Cognitive/Behavioral Status:  Neurologic State: Alert  Orientation Level: Appropriate for age;Oriented to person;Oriented to place;Oriented to situation  Cognition: Follows commands;Decreased attention/concentration; Impaired decision making(recommend further cognitive screen)  Perception: Cues to attend left visual field(had difficulty with L side of knee immobilizer)  Perseveration: No perseveration noted  Safety/Judgement: Decreased insight into deficits; Insight into deficits; Decreased awareness of need for safety; Fall prevention    Functional Mobility and Transfers for ADLs:  Bed Mobility:  Rolling: Moderate assistance;Maximum assistance  Scooting: Total assistance; Additional time                 Balance: decreased dynamic sitting balance while long sitting when attempting to reach to brace       ADL Intervention:  Desensitization and management of knee immobilizer training with poor ability to participate in both of these tasks; tolerated very light desensitization top and side of residual limb but not tolerated on back of distal limb/area of pressure while in supine or seated position; note small open wound L lateral knee \"I probably scratched it\"      Feeding  Feeding Assistance: Set-up(food noted in the bed from spills)                        [address]: Total assistance(dependent)  Socks: Total assistance (dependent)  Leg Crossed Method Used: No  Position Performed: Long sitting on bed(limb protector removal/donning heavy Max A)         Cognitive Retraining  Attention to Task: Single task  Maintains Attention For (Time): Greater than 10 minutes  Following Commands: Follows one step commands/directions(but info had to be repeated for every strap on knee brace)  Safety/Judgement: Decreased insight into deficits; Insight into deficits; Decreased awareness of need for safety; Fall prevention        Pain:  0/10 at rest; 10/10 with AROM, desensitization techniques; medicated during session    Activity Tolerance:   Fair and SpO2 stable on RA  Please refer to the flowsheet for vital signs taken during this treatment.     After treatment patient left in no apparent distress:   Supported sitting in bed, call bell in reach but hes not consistently using it; bed alarm on    COMMUNICATION/COLLABORATION:   The patients plan of care was discussed with: Registered nurse.      Daisy Monsalve OTR/L  Time Calculation: 44 mins

## 2020-07-24 VITALS
RESPIRATION RATE: 18 BRPM | OXYGEN SATURATION: 97 % | SYSTOLIC BLOOD PRESSURE: 96 MMHG | TEMPERATURE: 99.6 F | WEIGHT: 184.8 LBS | HEART RATE: 98 BPM | DIASTOLIC BLOOD PRESSURE: 58 MMHG | BODY MASS INDEX: 24.38 KG/M2

## 2020-07-24 LAB
GLUCOSE BLD STRIP.AUTO-MCNC: 145 MG/DL (ref 65–100)
GLUCOSE BLD STRIP.AUTO-MCNC: 197 MG/DL (ref 65–100)
GLUCOSE BLD STRIP.AUTO-MCNC: 97 MG/DL (ref 65–100)
SERVICE CMNT-IMP: ABNORMAL
SERVICE CMNT-IMP: ABNORMAL
SERVICE CMNT-IMP: NORMAL

## 2020-07-24 PROCEDURE — 82962 GLUCOSE BLOOD TEST: CPT

## 2020-07-24 PROCEDURE — 97535 SELF CARE MNGMENT TRAINING: CPT

## 2020-07-24 PROCEDURE — 90935 HEMODIALYSIS ONE EVALUATION: CPT

## 2020-07-24 PROCEDURE — 74011636637 HC RX REV CODE- 636/637

## 2020-07-24 PROCEDURE — 74011250637 HC RX REV CODE- 250/637

## 2020-07-24 PROCEDURE — 5A1D70Z PERFORMANCE OF URINARY FILTRATION, INTERMITTENT, LESS THAN 6 HOURS PER DAY: ICD-10-PCS

## 2020-07-24 PROCEDURE — 74011250636 HC RX REV CODE- 250/636

## 2020-07-24 RX ORDER — HYDROCODONE BITARTRATE AND ACETAMINOPHEN 7.5; 325 MG/1; MG/1
1 TABLET ORAL
Qty: 30 TAB | Refills: 0 | Status: SHIPPED | OUTPATIENT
Start: 2020-07-24 | End: 2020-08-07

## 2020-07-24 RX ADMIN — INSULIN LISPRO 2 UNITS: 100 INJECTION, SOLUTION INTRAVENOUS; SUBCUTANEOUS at 17:09

## 2020-07-24 RX ADMIN — CLOPIDOGREL BISULFATE 75 MG: 75 TABLET ORAL at 13:06

## 2020-07-24 RX ADMIN — Medication 2001 MG: at 07:36

## 2020-07-24 RX ADMIN — Medication 10 ML: at 14:16

## 2020-07-24 RX ADMIN — HYDROCODONE BITARTRATE AND ACETAMINOPHEN 1 TABLET: 7.5; 325 TABLET ORAL at 03:11

## 2020-07-24 RX ADMIN — INSULIN LISPRO 2 UNITS: 100 INJECTION, SOLUTION INTRAVENOUS; SUBCUTANEOUS at 13:58

## 2020-07-24 RX ADMIN — Medication 10 ML: at 06:00

## 2020-07-24 RX ADMIN — HEPARIN SODIUM 5000 UNITS: 5000 INJECTION INTRAVENOUS; SUBCUTANEOUS at 06:00

## 2020-07-24 RX ADMIN — ATORVASTATIN CALCIUM 40 MG: 40 TABLET, FILM COATED ORAL at 13:06

## 2020-07-24 RX ADMIN — HEPARIN SODIUM 5000 UNITS: 5000 INJECTION INTRAVENOUS; SUBCUTANEOUS at 13:58

## 2020-07-24 RX ADMIN — HYDROCODONE BITARTRATE AND ACETAMINOPHEN 1 TABLET: 7.5; 325 TABLET ORAL at 17:51

## 2020-07-24 RX ADMIN — TIMOLOL MALEATE 1 DROP: 2.5 SOLUTION/ DROPS OPHTHALMIC at 14:05

## 2020-07-24 RX ADMIN — Medication 2001 MG: at 13:06

## 2020-07-24 RX ADMIN — HYDROCODONE BITARTRATE AND ACETAMINOPHEN 1 TABLET: 7.5; 325 TABLET ORAL at 13:57

## 2020-07-24 RX ADMIN — Medication 2001 MG: at 17:09

## 2020-07-24 RX ADMIN — DOCUSATE SODIUM 100 MG: 100 CAPSULE, LIQUID FILLED ORAL at 13:57

## 2020-07-24 NOTE — DISCHARGE INSTRUCTIONS
Patient Discharge Instructions    Tori Ortiz / [id number] : 1955    Admitted 2020 Discharged: 2020     Patient Education        Learning About Screening for Heart Attack and Stroke Risk  What is screening for heart attack and stroke risk? Screening for heart attack and stroke risk is a way for your doctor to check your chance of having a problem called atherosclerosis. This problem is also called hardening of the arteries. It is the starting point for most heart and blood flow problems, such as coronary artery disease, heart attack, stroke, and peripheral arterial disease. You and your doctor can use your risk score to decide if you want to take steps to lower your risk. How can you find out your risk? Your doctor looks at things that put you at risk for a heart attack and stroke. He or she might look at many things, such as:  · Your cholesterol levels. · Your blood pressure. · Your age. · Your race. · Whether you are male or female. · Whether or not you smoke. Your doctor might use a tool to calculate a risk score for you. There are different tools that doctors use. They may show that your risk is higher or lower than it really is. But the tools give you and your doctor a good idea about your risk. What happens after screening? Knowing your risk can help you and your doctor talk about whether to take steps to lower your risk. A heart-healthy lifestyle is important for everyone. Some people also take medicine to lower their risk. You and your doctor can work together to decide what is best for you. Where can you learn more? Go to http://www.gray.com/  Enter M928 in the search box to learn more about \"Learning About Screening for Heart Attack and Stroke Risk. \"  Current as of: 2019               Content Version: 12.5  © 5887-8718 [employer].    Care instructions adapted under license by [employer] (which disclaims liability or warranty for this information). If you have questions about a medical condition or this instruction, always ask your healthcare professional. [place] [address] any warranty or liability for your use of this information. Take Home Medications     . · It is important that you take the medication exactly as they are prescribed. · Keep your medication in the bottles provided by the pharmacist and keep a list of the medication names, dosages, and times to be taken in your wallet. · Do not take other medications without consulting your doctor. What to do at Home    Recommended diet: Diabetic Diet,     Recommended activity: Activity as tolerated,     Additional Instructions: continue care to right heel, use limb guard to try to keep knee straight    Follow-up with Dr Douglas Robles in 2 weeks, call [phone] for appt        Information obtained by :  I understand that if any problems occur once I am at home I am to contact my physician. I understand and acknowledge receipt of the instructions indicated above.                                                                                                                                            Physician's or R.N.'s Signature                                                                  Date/Time                                                                                                                                              Patient or Representative Signature                                                          Date/Time

## 2020-07-24 NOTE — PROGRESS NOTES
Problem: Self Care Deficits Care Plan (Adult)  Goal: *Acute Goals and Plan of Care (Insert Text)  Description:   FUNCTIONAL STATUS PRIOR TO ADMISSION: was in SNF level rehab since Feb 2020 post toe amputations (twice); Reports [facility] staff assisting with ADLs-min-mod A as well as functional mobility, eg not toileting by himself; not a great historian with widely varying level of assist reported since CVA in 2011; appears had returned to driving and work for several years but thinks he moved to [facility] ~ 2 yrs ago; has used w/c since 2-2020 when L toe amputations were initiated    HOME SUPPORT: The patient lived with SNF x 2 yrs per his report with rehab only since Feb 2020. Occupational Therapy Goals  Initiated 7/22/2020  1. Patient will perform bathing with minimal assistance/contact guard assist within 7 day(s). 2.  Patient will perform upper body dressing with supervision/set-up within 7 day(s). 3.  Patient will perform lower body dressing with moderate assistance  within 7 day(s). 4.  Patient will perform toilet transfers with moderate assistance  within 7 day(s). 5.  Patient will perform all aspects of toileting with moderate assistance  within 7 day(s). 6.  Patient will participate in upper extremity therapeutic exercise/activities with supervision/set-up for 5 minutes within 7 day(s). 7.  Patient will utilize energy conservation, PLB, fall prevention, BKA desensitization and positioning techniques during functional activities with verbal, visual and tactile cues within 7 day(s).            Outcome: Progressing Towards Goal   OCCUPATIONAL THERAPY TREATMENT  Patient: Jing Reyes (67 y.o. 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 ASSESSMENT  Patient continues with skilled OT services and is progressing towards goals. Patient fatigued post dialysis but willing to work; Limited by pain and general debility as well as L hemiparesis and decreased problem solving. Perseverates regarding scooting vs strap removal on limb guard, yet limb guard use was not addressed this session     Current Level of Function Impacting Discharge (ADLs): Max A-D LE ADLs and functional mobility    Other factors to consider for discharge: unable to perform car transfer safely         PLAN :  Patient continues to benefit from skilled intervention to address the above impairments. Continue treatment per established plan of care. to address goals. Recommend with staff: cues for desensitization; he does not have cognition to ask for pain meds effectively; at 3:30 reported he could not have pain meds because he already had them at 4:00    Recommend next OT session: UE and LE ADLs-sock aide    Recommendation for discharge: (in order for the patient to meet his/her long term goals)  Therapy up to 5 days/week in SNF setting    This discharge recommendation:  Has been made in collaboration with the attending provider and/or case management    IF patient discharges home will need the following DME: AE: long handled bathing, AE: long handled dressing, brace/splint, bedside commode, gait belt, hospital bed, transfer bench, wheelchair, and sliding/transfer board       SUBJECTIVE:   Patient stated I should stay till Sunday because this pain is really bad.     OBJECTIVE DATA SUMMARY:   Cognitive/Behavioral Status:  Neurologic State: Alert;Confused  Orientation Level: Disoriented to time;Oriented to person;Oriented to place;Oriented to situation  Cognition: Follows commands; Impaired decision making; Impulsive;Memory loss  Perception: Cues to attend left visual field  Perseveration: No perseveration noted  Safety/Judgement: Fall prevention;Decreased insight into deficits; Decreased awareness of need for safety; Awareness of environment    Functional Mobility and Transfers for ADLs:  Bed Mobility:  Rolling: Moderate assistance  Supine to Sit: Maximum assistance  Sit to Supine: Moderate assistance  Scooting: Maximum assistance; Total assistance; Additional time    Transfers:     Functional Transfers  Toilet Transfer : Assist x2; Additional time; Adaptive equipment;Maximum assistance; Total assistance(lateral to DABSC inferred)       Balance:  Sitting: Impaired  Sitting - Static: Good (unsupported)  Sitting - Dynamic: Fair (occasional)    ADL Intervention:                      Upper Body Dressing Assistance  Dressing Assistance: Minimum assistance    Lower Body Dressing Assistance  Dressing Assistance: Maximum assistance; Total assistance(dependent)  Protective Undergarmet: Maximum assistance  Socks: Total assistance (dependent)  Leg Crossed Method Used: No  Position Performed: Seated edge of bed         Cognitive Retraining  Safety/Judgement: Fall prevention;Decreased insight into deficits; Decreased awareness of need for safety; Awareness of environment        Pain:  6/10; pain management with BKA reviewed; nsg notified    Activity Tolerance:   Fair and requires rest breaks  Please refer to the flowsheet for vital signs taken during this treatment. After treatment patient left in no apparent distress:   Supine in bed, Heels elevated for pressure relief, Call bell within reach, and Side rails x 3    COMMUNICATION/COLLABORATION:   The patients plan of care was discussed with: Physical therapist, Registered nurse, and Case management.      Daisy Monsalve OTR/L  Time Calculation: 31 mins

## 2020-07-24 NOTE — PROCEDURES
[facility] Dialysis Team [facility] Acutes  [phone]    Vitals   Pre   Post   Assessment   Pre   Post     Temp  Temp: 97.9 °F (36.6 °C) (07/24/20 0845)   LOC  A&Ox4 A&Ox4   HR   Pulse (Heart Rate): 98 (07/24/20 0845) 105 Lungs   Clear, RA Remains on RA    B/P  BP: 140/63 (07/24/20 0845) 125/60 Cardiac   S1S2, RRR RRR    Resp   Resp Rate: 20 (07/24/20 0845) 18 Skin   Warm, dry No change    Pain level  Pain Intensity 1: 0 (07/24/20 0351)  Edema  No edema None   Orders:    Duration:   Start:    0845 End:    1215 Total:   3.5hrs   Dialyzer:   Dialyzer/Set Up Inspection: Mandy Lizama (07/24/20 0845)   K Bath:   Dialysate K (mEq/L): 2 (07/24/20 0845)   Ca Bath:   Dialysate CA (mEq/L): 2.5 (07/24/20 0845)   Na/Bicarb:   Dialysate NA (mEq/L): 140 (07/24/20 0845)   Target Fluid Removal:   Goal/Amount of Fluid to Remove (mL): 2500 mL (07/24/20 0845)   Access     Type & Location:  HARSHA AVF +bruit/thrill, cannulated with 15g needles each site.  as ordered with acceptable pressures. Labs     Obtained/Reviewed   Critical Results Called   Date when labs were drawn-  Hgb-    HGB   Date Value Ref Range Status   07/22/2020 9.1 (L) 12.1 - 17.0 g/dL Final     K-    Potassium   Date Value Ref Range Status   07/22/2020 4.0 3.5 - 5.1 mmol/L Final     Ca-   Calcium   Date Value Ref Range Status   07/22/2020 9.0 8.5 - 10.1 MG/DL Final     Bun-   BUN   Date Value Ref Range Status   07/22/2020 49 (H) 6 - 20 MG/DL Final     Creat-   Creatinine   Date Value Ref Range Status   07/22/2020 8.44 (H) 0.70 - 1.30 MG/DL Final     Comment:     INVESTIGATED PER DELTA CHECK PROTOCOL      Medications/ Blood Products Given     Name   Dose   Route and Time     None given                Blood Volume Processed (BVP):   79L Net Fluid   Removed:  2000 mL   Comments   Time Out Done: 8961  Primary Nurse Rpt Pre: KB Carreon RN  Primary Nurse Rpt Post: MATHEUS Finley RN  Pt Education: access care  Care Plan: HD today followed by discharge to SNF  Tx Summary:  0840: Safety checks complete, time out performed. 0845: HARSHA AVF assessed, +bruit/thrill, no redness, warmth or drainage noted. Skin prepped per procedure using alcohol x 60 sec each site. Cannulated using 15g needles each site and secured with tape. +flash/aspiration/flush. HD initiated. Access visible, lines secure. Medications reviewed. 1020: MATHEUS Navarro MD at bedside for exam and to discuss plan of care. 1215: HD treatment complete. All possible blood returned to the patient. De-cannulated and pressure held until hemostasis was achieved. Dressing applied. +bruit/thrill. VSS. SBAR called to primary RN.      Admitting Diagnosis: L BKA, ESRD  Pt's previous clinic: [facility] [address]  Informed Consent Verified: Yes (07/24/20 0845)  Ananya Consent: Verified  Hepatitis Status: HBsAg: Neg(01/15/20) HBsAb: immune (01/15/20) obtained from clinic  Machine Number: [id number] (07/24/20 0845)  Telemetry status: Not monitored  Pre-Dialysis Weight: 83.8 kg (184 lb 11.9 oz) (07/24/20 0845)

## 2020-07-24 NOTE — PROGRESS NOTES
Transition of Care Plan to SNF/Rehab    SNF/Rehab Transition:  Patient has been accepted to [facility] and meets criteria for admission. Patient will transported by ambulance and expected to leave at 6 pm    Communication to Patient/Family:  Met with patient and sister/poa  and they are agreeable to the transition plan. Communication to SNF/Rehab:  Bedside RN, Claudia/Royer , has been notified to update the transition plan to the facility and call report (phone number [phone]). Discharge information has been updated on the AVS.     Discharge instructions to be fax'd to facility at [facility] # [phone]. Nursing Please include all hard scripts for controlled substances, med rec and dc summary, and AVS in packet. SNF/Rehab Transition:  Patient to follow-up with Home Health: [facility], other ,none)  PCP/Specialist: Dr. Anna Ocampo and confirmed with facility, [address] [place] [address] they can manage the patient care needs for the following:     Rahel Toussaint with (X) only those applicable:    Medication:  [x]  Medications will be available at the facility  []  IV Antibiotics   []  Controlled Substance - hard copy to be sent with patient   []  Weekly Labs   Documents:  [] Hard RX  [x] MAR  [x] Kardex  [x] AVS  []Transfer Summary  []Discharge   Equipment:  []  CPAP/BiPAP  []  Wound Vacuum  []  Wilkinson or Urinary Device  []  PICC/Central Line  []  Nebulizer  []  Ventilator   Treatment:  []Isolation (for MRSA, VRE, etc.)  []Surgical Drain Management  []Tracheostomy Care  []Dressing Changes  []Dialysis with transportation and chair time   []PEG Care  []Oxygen  []Daily Weights for Heart Failure   Dietary:  []Any diet limitations  []Tube Feedings   []Total Parenteral Management (TPN)   Eligible for Medicaid Long Term Services and Supports  Yes:  [] Eligible for medical assistance or will become eligible within 180 days and UAI completed.    [] Provider/Patient and/or support system has requested screening. [] UAI copy provided to patient or responsible party  [] UAI unavailable at discharge will send once processed to SNF provider. [] UAI unavailable at discharged mailed to patient  No:   [] Private pay and is not financially eligible for Medicaid within the next 180 days. [] Reside out-of-state.   [] A residents of a state owned/operated facility that is licensed  by [facility] and [facility] or [facility]  [] Enrollment in [facility] hospice services  []  [facility]  [] Patient /Family declines to have screening completed or provide financial information for screening     Financial Resources:  Medicaid    [] Initiated and application pending   [x] Full coverage     Advanced Care Plan:  []Surrogate Decision Maker of Care  []POA  [x]Communicated Code Status  (DDNR\", \"Full\")    Other     Financial Resources:  Medicaid    [] Initiated and application pending   [] Full coverage     Advanced Care Plan:  []Surrogate Decision Maker of Care  []POA  [x]Communicated Code Status (DDNR\", \"Full\")    Other

## 2020-07-24 NOTE — PROGRESS NOTES
JJ:    RUR: 12%    CM noted discharge order and contacted [facility]. They may not have a bed for patient today, as all patients returning from the hospital must go to a private room for isolation. Admissions will call me back once they find out. [facility] sent a referral to [facility] requesting ambulance transport for patients' dialysis starting on Monday, to [facility]. 1:30pm: Response received from [facility] stating they are not contracted with [address] [place] [address] for picking up patients and that it would have to go through patients' insurance. 2:00 pm: CM called patients' insurance [facility]) to attempt to arrange ambulance transport  for dialysis starting next week. Malena Kaiden stated that unfortunately patients' insurance does not cover ambulances at all. Cm called patients' sister Nereida Morton, [phone]) with this update and informed her that she would have to continue to use [facility] transport to /drop off patient for his dialysis. Sister is very concerned about this, stating she feels patient needs more help than this. We discussed this for quite some time, CM suggested she talk with the staff at both the SNF and the dialysis unit, and ask that they assist patient with his transfers. 2:40pm: [facility] called back and stated they can accept patient today. Referral sent to [facility] requesting a 4:30 pm .        Gianni POWELL, ACM

## 2020-07-24 NOTE — PROGRESS NOTES
Bedside shift change report given to Arthur Meza RN and Jossy Amaya RN (oncoming nurse) by Reynaldo Mckeon RN (offgoing nurse). Report included the following information SBAR, Kardex, Intake/Output and MAR.

## 2020-07-24 NOTE — PROGRESS NOTES
Nephrology Progress Note  Pankaj Mckeon  Date of Admission : 7/21/2020    CC:  Follow up for ESRD       Assessment and Plan     ESRD on HD:  - MWF at [facility]  - HD now  - d/c to SNF post HD    HTN    Anemia of CKD:  - ARSALAN w/ HD    Secondary HPT:  - on phoslo w/ meals    DM2:  - on insulin    PAD s/p L BKA       Interval History:  Seen and examined on dialysis.  BP stable.  Feeling ok.  Pain controlled.   No cp, sob, n/v/d reported.    Current Medications: all current  Medications have been eviewed in EPIC  Review of Systems: Pertinent items are noted in HPI.    Objective:  Vitals:    Vitals:    07/24/20 1030 07/24/20 1045 07/24/20 1100 07/24/20 1115   BP: 99/65 116/67 144/73 130/66   Pulse: 100 (!) 101 (!) 110 (!) 104   Resp:       Temp:       TempSrc:       SpO2:       Weight:         Intake and Output:  No intake/output data recorded.  07/22 1901 - 07/24 0700  In: -   Out: 1500     Physical Examination:  General: NAD,Conversant   Neck:  Supple, no mass  Resp:  Lungs CTA B/L, no wheezing , normal respiratory effort  CV:  RRR,  no murmur or rub, no LE edema, L BKA  GI:  Soft, NT, + Bowel sounds, no hepatosplenomegaly  Neurologic:  Non focal  Psych:             AAO x 3 appropriate affect   Skin:  No Rash  Access:           LUE AVF +thrill/bruit    []    High complexity decision making was performed  []    Patient is at high-risk of decompensation with multiple organ involvement    Lab Data Personally Reviewed: I have reviewed all the pertinent labs, microbiology data and radiology studies during assessment.    Recent Labs     07/22/20  0550      K 4.0   CL 98   CO2 27   *   BUN 49*   CREA 8.44*   CA 9.0     Recent Labs     07/22/20  0550   WBC 9.6   HGB 9.1*   HCT 29.1*        No results found for: SDES  Lab Results   Component Value Date/Time    Culture result: SCANT  MIXED SKIN MAHESH ISOLATED   03/17/2017 03:08 PM    Culture result: NO ANAEROBES ISOLATED 03/17/2017 03:08 PM    Culture  result: NO GROWTH 2 DAYS 06/24/2016 10:25 PM     Recent Results (from the past 24 hour(s))   GLUCOSE, POC    Collection Time: 07/23/20  4:06 PM   Result Value Ref Range    Glucose (POC) 162 (H) 65 - 100 mg/dL    Performed by [address], POC    Collection Time: 07/23/20  9:08 PM   Result Value Ref Range    Glucose (POC) 208 (H) 65 - 100 mg/dL    Performed by Lashon Pace    GLUCOSE, POC    Collection Time: 07/24/20  6:14 AM   Result Value Ref Range    Glucose (POC) 97 65 - 100 mg/dL    Performed by Mary Lobo MD  [address]  Phone - [phone]   Fax - [phone]  www. [facility]

## 2020-07-24 NOTE — PROGRESS NOTES
Occupational Therapy  Chart reviewed; note patient at HD; will retry later as able if not discharged to SNF.  Lilli Márquez OTR/L

## 2020-07-25 LAB
ABO + RH BLD: NORMAL
ANTIGENS PRESENT BLD: NORMAL
ANTIGENS PRESENT RBC DONR: NORMAL
BLD PROD TYP BPU: NORMAL
BLOOD BANK CMNT PATIENT-IMP: NORMAL
BLOOD GROUP ANTIBODIES SERPL: NORMAL
BLOOD GROUP ANTIBODIES SERPL: NORMAL
BPU ID: NORMAL
CROSSMATCH RESULT,%XM: NORMAL
SPECIMEN EXP DATE BLD: NORMAL
STATUS OF UNIT,%ST: NORMAL
UNIT DIVISION, %UDIV: 0

## 2020-08-03 NOTE — DISCHARGE SUMMARY
[address]til[address] Car[address] SUMMARY    Name:  Vernadine Snellen  MR#:  [id number]  :  1955  ACCOUNT #:  [de-identified]  ADMIT DATE:  2020  DISCHARGE DATE:  2020      FINAL DIAGNOSES:  1. Peripheral vascular disease with nonhealing left foot wound. 2.  Diabetes. 3.  Renal failure on dialysis. PROCEDURE:  Left below-knee amputation. HISTORY:  The patient is a 75-year-old male with diabetes and renal failure, who has a nonhealing wound on his left foot despite revascularization with tibial angioplasty and amputation of the left great toe. He is admitted for below-knee amputation. HOSPITAL COURSE:  The patient was admitted and taken to the operating room where he underwent a left below-knee amputation. His postoperative course was uncomplicated. He was transferred back to a skilled nursing facility on 2020. At that time, he remains clinically stable and maintaining his usual hemodialysis schedule. At the time of discharge, his incision is healing well on his below-knee amputation. He is discharged on a diabetic diet, activity as tolerated, his usual medications with the addition of Norco for pain, and follow up in my office in 2 weeks. DISPOSITION:  Skilled nursing facility.         Grant Wong MD      [id number]  D:  2020 7:22  T:  2020 8:01  JOB #:  [id number]

## 2020-08-12 ENCOUNTER — APPOINTMENT (OUTPATIENT)
Dept: VASCULAR SURGERY | Age: 65
DRG: 853 | End: 2020-08-12
Attending: FAMILY MEDICINE
Payer: MEDICARE

## 2020-08-12 ENCOUNTER — APPOINTMENT (OUTPATIENT)
Dept: GENERAL RADIOLOGY | Age: 65
DRG: 853 | End: 2020-08-12
Attending: EMERGENCY MEDICINE
Payer: MEDICARE

## 2020-08-12 ENCOUNTER — HOSPITAL ENCOUNTER (INPATIENT)
Age: 65
LOS: 10 days | Discharge: NURSING, REHAB OR LONG-TERM CARE | DRG: 853 | End: 2020-08-22
Attending: EMERGENCY MEDICINE | Admitting: FAMILY MEDICINE
Payer: MEDICARE

## 2020-08-12 DIAGNOSIS — L97.419 CHRONIC HEEL ULCER, RIGHT, WITH UNSPECIFIED SEVERITY (HCC): ICD-10-CM

## 2020-08-12 DIAGNOSIS — A41.9 SEPSIS WITHOUT ACUTE ORGAN DYSFUNCTION, DUE TO UNSPECIFIED ORGANISM (HCC): ICD-10-CM

## 2020-08-12 DIAGNOSIS — L03.90 NECROTIZING CELLULITIS: Primary | ICD-10-CM

## 2020-08-12 LAB
ANION GAP SERPL CALC-SCNC: 7 MMOL/L (ref 5–15)
BASOPHILS # BLD: 0 K/UL (ref 0–0.1)
BASOPHILS NFR BLD: 0 % (ref 0–1)
BUN SERPL-MCNC: 15 MG/DL (ref 6–20)
BUN/CREAT SERPL: 4 (ref 12–20)
CALCIUM SERPL-MCNC: 9.9 MG/DL (ref 8.5–10.1)
CHLORIDE SERPL-SCNC: 95 MMOL/L (ref 97–108)
CO2 SERPL-SCNC: 33 MMOL/L (ref 21–32)
COMMENT, HOLDF: NORMAL
CREAT SERPL-MCNC: 3.47 MG/DL (ref 0.7–1.3)
DIFFERENTIAL METHOD BLD: ABNORMAL
EOSINOPHIL # BLD: 0.1 K/UL (ref 0–0.4)
EOSINOPHIL NFR BLD: 1 % (ref 0–7)
ERYTHROCYTE [DISTWIDTH] IN BLOOD BY AUTOMATED COUNT: 14 % (ref 11.5–14.5)
EST. AVERAGE GLUCOSE BLD GHB EST-MCNC: 108 MG/DL
GLUCOSE BLD STRIP.AUTO-MCNC: 206 MG/DL (ref 65–100)
GLUCOSE SERPL-MCNC: 220 MG/DL (ref 65–100)
HBA1C MFR BLD: 5.4 % (ref 4–5.6)
HCT VFR BLD AUTO: 28.8 % (ref 36.6–50.3)
HGB BLD-MCNC: 8.7 G/DL (ref 12.1–17)
IMM GRANULOCYTES # BLD AUTO: 0.1 K/UL (ref 0–0.04)
IMM GRANULOCYTES NFR BLD AUTO: 1 % (ref 0–0.5)
LACTATE SERPL-SCNC: 0.9 MMOL/L (ref 0.4–2)
LYMPHOCYTES # BLD: 1.1 K/UL (ref 0.8–3.5)
LYMPHOCYTES NFR BLD: 7 % (ref 12–49)
MCH RBC QN AUTO: 29.9 PG (ref 26–34)
MCHC RBC AUTO-ENTMCNC: 30.2 G/DL (ref 30–36.5)
MCV RBC AUTO: 99 FL (ref 80–99)
MONOCYTES # BLD: 0.6 K/UL (ref 0–1)
MONOCYTES NFR BLD: 4 % (ref 5–13)
NEUTS SEG # BLD: 13.1 K/UL (ref 1.8–8)
NEUTS SEG NFR BLD: 87 % (ref 32–75)
NRBC # BLD: 0 K/UL (ref 0–0.01)
NRBC BLD-RTO: 0 PER 100 WBC
PLATELET # BLD AUTO: 490 K/UL (ref 150–400)
PMV BLD AUTO: 9.4 FL (ref 8.9–12.9)
POTASSIUM SERPL-SCNC: 3.4 MMOL/L (ref 3.5–5.1)
RBC # BLD AUTO: 2.91 M/UL (ref 4.1–5.7)
SAMPLES BEING HELD,HOLD: NORMAL
SERVICE CMNT-IMP: ABNORMAL
SODIUM SERPL-SCNC: 135 MMOL/L (ref 136–145)
WBC # BLD AUTO: 14.9 K/UL (ref 4.1–11.1)

## 2020-08-12 PROCEDURE — 83036 HEMOGLOBIN GLYCOSYLATED A1C: CPT

## 2020-08-12 PROCEDURE — 96365 THER/PROPH/DIAG IV INF INIT: CPT

## 2020-08-12 PROCEDURE — 99285 EMERGENCY DEPT VISIT HI MDM: CPT

## 2020-08-12 PROCEDURE — 85025 COMPLETE CBC W/AUTO DIFF WBC: CPT

## 2020-08-12 PROCEDURE — 74011250636 HC RX REV CODE- 250/636: Performed by: FAMILY MEDICINE

## 2020-08-12 PROCEDURE — 86922 COMPATIBILITY TEST ANTIGLOB: CPT

## 2020-08-12 PROCEDURE — 36415 COLL VENOUS BLD VENIPUNCTURE: CPT

## 2020-08-12 PROCEDURE — 82962 GLUCOSE BLOOD TEST: CPT

## 2020-08-12 PROCEDURE — 73650 X-RAY EXAM OF HEEL: CPT

## 2020-08-12 PROCEDURE — 87040 BLOOD CULTURE FOR BACTERIA: CPT

## 2020-08-12 PROCEDURE — 74011250637 HC RX REV CODE- 250/637: Performed by: EMERGENCY MEDICINE

## 2020-08-12 PROCEDURE — 93005 ELECTROCARDIOGRAM TRACING: CPT

## 2020-08-12 PROCEDURE — 86900 BLOOD TYPING SEROLOGIC ABO: CPT

## 2020-08-12 PROCEDURE — 86920 COMPATIBILITY TEST SPIN: CPT

## 2020-08-12 PROCEDURE — 74011000258 HC RX REV CODE- 258: Performed by: EMERGENCY MEDICINE

## 2020-08-12 PROCEDURE — 86921 COMPATIBILITY TEST INCUBATE: CPT

## 2020-08-12 PROCEDURE — 93970 EXTREMITY STUDY: CPT

## 2020-08-12 PROCEDURE — 80048 BASIC METABOLIC PNL TOTAL CA: CPT

## 2020-08-12 PROCEDURE — 74011636637 HC RX REV CODE- 636/637: Performed by: FAMILY MEDICINE

## 2020-08-12 PROCEDURE — 86870 RBC ANTIBODY IDENTIFICATION: CPT

## 2020-08-12 PROCEDURE — 94760 N-INVAS EAR/PLS OXIMETRY 1: CPT

## 2020-08-12 PROCEDURE — 83605 ASSAY OF LACTIC ACID: CPT

## 2020-08-12 PROCEDURE — 74011000250 HC RX REV CODE- 250: Performed by: FAMILY MEDICINE

## 2020-08-12 PROCEDURE — 74011250637 HC RX REV CODE- 250/637: Performed by: FAMILY MEDICINE

## 2020-08-12 PROCEDURE — 74011250636 HC RX REV CODE- 250/636: Performed by: EMERGENCY MEDICINE

## 2020-08-12 PROCEDURE — 65660000000 HC RM CCU STEPDOWN

## 2020-08-12 RX ORDER — ACETAMINOPHEN 500 MG
1000 TABLET ORAL
Status: COMPLETED | OUTPATIENT
Start: 2020-08-12 | End: 2020-08-12

## 2020-08-12 RX ORDER — MAGNESIUM SULFATE 100 %
4 CRYSTALS MISCELLANEOUS AS NEEDED
Status: DISCONTINUED | OUTPATIENT
Start: 2020-08-12 | End: 2020-08-22 | Stop reason: HOSPADM

## 2020-08-12 RX ORDER — VANCOMYCIN 1.75 GRAM/500 ML IN 0.9 % SODIUM CHLORIDE INTRAVENOUS
1750 ONCE
Status: COMPLETED | OUTPATIENT
Start: 2020-08-12 | End: 2020-08-12

## 2020-08-12 RX ORDER — ISOSORBIDE MONONITRATE 30 MG/1
60 TABLET, EXTENDED RELEASE ORAL DAILY
Status: DISCONTINUED | OUTPATIENT
Start: 2020-08-13 | End: 2020-08-22 | Stop reason: HOSPADM

## 2020-08-12 RX ORDER — CLOPIDOGREL BISULFATE 75 MG/1
75 TABLET ORAL DAILY
Status: DISCONTINUED | OUTPATIENT
Start: 2020-08-13 | End: 2020-08-22 | Stop reason: HOSPADM

## 2020-08-12 RX ORDER — TAMSULOSIN HYDROCHLORIDE 0.4 MG/1
0.4 CAPSULE ORAL
Status: DISCONTINUED | OUTPATIENT
Start: 2020-08-12 | End: 2020-08-22 | Stop reason: HOSPADM

## 2020-08-12 RX ORDER — DEXTROSE MONOHYDRATE 100 MG/ML
0-250 INJECTION, SOLUTION INTRAVENOUS AS NEEDED
Status: DISCONTINUED | OUTPATIENT
Start: 2020-08-12 | End: 2020-08-22 | Stop reason: HOSPADM

## 2020-08-12 RX ORDER — ACETAMINOPHEN 325 MG/1
650 TABLET ORAL
Status: DISCONTINUED | OUTPATIENT
Start: 2020-08-12 | End: 2020-08-22 | Stop reason: HOSPADM

## 2020-08-12 RX ORDER — CLINDAMYCIN PHOSPHATE 600 MG/50ML
600 INJECTION INTRAVENOUS EVERY 8 HOURS
Status: DISCONTINUED | OUTPATIENT
Start: 2020-08-12 | End: 2020-08-12 | Stop reason: ALTCHOICE

## 2020-08-12 RX ORDER — SODIUM CHLORIDE 9 MG/ML
75 INJECTION, SOLUTION INTRAVENOUS CONTINUOUS
Status: DISCONTINUED | OUTPATIENT
Start: 2020-08-12 | End: 2020-08-15

## 2020-08-12 RX ORDER — DEXTROSE MONOHYDRATE 100 MG/ML
0-250 INJECTION, SOLUTION INTRAVENOUS AS NEEDED
Status: DISCONTINUED | OUTPATIENT
Start: 2020-08-12 | End: 2020-08-12 | Stop reason: SDUPTHER

## 2020-08-12 RX ORDER — ATORVASTATIN CALCIUM 20 MG/1
20 TABLET, FILM COATED ORAL DAILY
Status: DISCONTINUED | OUTPATIENT
Start: 2020-08-13 | End: 2020-08-22 | Stop reason: HOSPADM

## 2020-08-12 RX ORDER — INSULIN GLARGINE 100 [IU]/ML
16 INJECTION, SOLUTION SUBCUTANEOUS DAILY
Status: DISCONTINUED | OUTPATIENT
Start: 2020-08-13 | End: 2020-08-15

## 2020-08-12 RX ORDER — SODIUM CHLORIDE 0.9 % (FLUSH) 0.9 %
5-40 SYRINGE (ML) INJECTION EVERY 8 HOURS
Status: DISCONTINUED | OUTPATIENT
Start: 2020-08-12 | End: 2020-08-17

## 2020-08-12 RX ORDER — HEPARIN SODIUM 5000 [USP'U]/ML
5000 INJECTION, SOLUTION INTRAVENOUS; SUBCUTANEOUS EVERY 8 HOURS
Status: DISCONTINUED | OUTPATIENT
Start: 2020-08-12 | End: 2020-08-22 | Stop reason: HOSPADM

## 2020-08-12 RX ORDER — SODIUM CHLORIDE 0.9 % (FLUSH) 0.9 %
5-40 SYRINGE (ML) INJECTION AS NEEDED
Status: DISCONTINUED | OUTPATIENT
Start: 2020-08-12 | End: 2020-08-22 | Stop reason: HOSPADM

## 2020-08-12 RX ORDER — INSULIN LISPRO 100 [IU]/ML
INJECTION, SOLUTION INTRAVENOUS; SUBCUTANEOUS
Status: DISCONTINUED | OUTPATIENT
Start: 2020-08-12 | End: 2020-08-22 | Stop reason: HOSPADM

## 2020-08-12 RX ORDER — LATANOPROST 50 UG/ML
1 SOLUTION/ DROPS OPHTHALMIC
Status: DISCONTINUED | OUTPATIENT
Start: 2020-08-12 | End: 2020-08-22 | Stop reason: HOSPADM

## 2020-08-12 RX ORDER — MAGNESIUM SULFATE 100 %
4 CRYSTALS MISCELLANEOUS AS NEEDED
Status: DISCONTINUED | OUTPATIENT
Start: 2020-08-12 | End: 2020-08-12 | Stop reason: SDUPTHER

## 2020-08-12 RX ORDER — INSULIN LISPRO 100 [IU]/ML
INJECTION, SOLUTION INTRAVENOUS; SUBCUTANEOUS
Status: DISCONTINUED | OUTPATIENT
Start: 2020-08-12 | End: 2020-08-12 | Stop reason: SDUPTHER

## 2020-08-12 RX ADMIN — INSULIN LISPRO 1 UNITS: 100 INJECTION, SOLUTION INTRAVENOUS; SUBCUTANEOUS at 22:10

## 2020-08-12 RX ADMIN — Medication 10 ML: at 21:41

## 2020-08-12 RX ADMIN — TAMSULOSIN HYDROCHLORIDE 0.4 MG: 0.4 CAPSULE ORAL at 21:40

## 2020-08-12 RX ADMIN — SODIUM CHLORIDE 75 ML/HR: 900 INJECTION, SOLUTION INTRAVENOUS at 20:55

## 2020-08-12 RX ADMIN — MEROPENEM 500 MG: 500 INJECTION, POWDER, FOR SOLUTION INTRAVENOUS at 18:24

## 2020-08-12 RX ADMIN — LATANOPROST 1 DROP: 50 SOLUTION OPHTHALMIC at 21:40

## 2020-08-12 RX ADMIN — VANCOMYCIN HYDROCHLORIDE 1750 MG: 10 INJECTION, POWDER, LYOPHILIZED, FOR SOLUTION INTRAVENOUS at 21:30

## 2020-08-12 RX ADMIN — ACETAMINOPHEN 1000 MG: 500 TABLET ORAL at 16:06

## 2020-08-12 RX ADMIN — CLINDAMYCIN IN 5 PERCENT DEXTROSE 600 MG: 12 INJECTION, SOLUTION INTRAVENOUS at 17:18

## 2020-08-12 RX ADMIN — HEPARIN SODIUM 5000 UNITS: 5000 INJECTION INTRAVENOUS; SUBCUTANEOUS at 21:38

## 2020-08-12 NOTE — H&P
History & Physical    Primary Care Provider: Falguni Crews MD  Source of Information: Patient     History of Presenting Illness:   Meena Johnson is a 75 y.o. male who presents with right foot ulcer. History was probably obtained from the patient and patient family member who is present at the bedside    Patient presented to the ER with a right heel wound. Per patient and the patient's family member that the wound has been there for about 6 to 8 months, he has been getting wound care for the same, but the wound got worse and starting hurting. Today it was noticed that the wound was draining, patient was sent to the ER for further management and evaluation. Patient continues to have pain and discomfort in his right lower extremity. Patient recently had a BKA in July due to peripheral arterial disease. The patient denies any Headache, blurry vision, sore throat, trouble swallowing, trouble with speech, chest pain, SOB, cough, fever, chills, N/V/D, abd pain, urinary symptoms, constipation, recent travels, sick contacts, focal or generalized oneirological symptoms,  falls, injuries, rashes, contact with COVID-19 diagnosed patients, hematemesis, melena, hemoptysis, hematuria, rashes, denies starting any new medications and denies any other concerns or problems besides as mentioned above. Review of Systems:  A comprehensive review of systems was negative except for that written in the History of Present Illness.      Past Medical History:   Diagnosis Date    CAD (coronary artery disease)      2014, MI, CABG    Chronic kidney disease     on dialysis MWF    [facility]     ESRD [facility]end stage [facility]) [facility]     Glaucoma     [facility] failure [facility] [facility]     chronic diastolic HF per cardiology note    Hyperlipidemia     Hypertension     Ill-defined condition     overweight BMI 27.2    PAD [facility]peripheral artery disease) [facility]     Stroke [facility]) August 2011    residual left side weakness stroke x 2 per cardiology note      Past Surgical History:   Procedure Laterality Date    CARDIAC SURG PROCEDURE UNLIST      cavbg 2014 x3 VESSELS    HX CHOLECYSTECTOMY  02/2020    HX CORONARY ARTERY BYPASS GRAFT  February 2014    HX HEART CATHETERIZATION  01/24/2014    HX OTHER SURGICAL Left 06/2016    last toe on left foot amputated, for nonhealing toe ulcer    HX VASCULAR ACCESS Right Brenden Catheter    removed when fistula healed    VASCULAR SURGERY PROCEDURE UNLIST      fistula left arm     Prior to Admission medications    Medication Sig Start Date End Date Taking? Authorizing Provider   insulin glargine U-300 conc (Toujeo SoloStar U-300 Insulin) 300 unit/mL (1.5 mL) inpn pen 20 Units by SubCUTAneous route daily. Provider, Historical   multivitamin (ONE A DAY) tablet Take 1 Tab by mouth daily. Provider, Historical   clopidogreL (PLAVIX) 75 mg tab Take 75 mg by mouth. Provider, Historical   calcium acetate (PHOSLO) 667 mg cap Take 3 Caps by mouth three (3) times daily (with meals). Provider, Historical   FOLIC ACID/VIT BCOMP,C (DIALYVITE PO) Take 1 Tab by mouth daily. Provider, Historical   traZODone (DESYREL) 50 mg tablet Take 150 mg by mouth nightly. Provider, Historical   latanoprost (XALATAN) 0.005 % ophthalmic solution Administer 1 Drop to both eyes nightly. Provider, Historical   levobunolol (BETAGAN) 0.5 % ophthalmic solution Administer 1 Drop to both eyes daily. Provider, Historical   docusate sodium (COLACE) 100 mg capsule Take 100 mg by mouth two (2) times daily as needed. Provider, Historical   isosorbide mononitrate ER (IMDUR) 60 mg CR tablet Take 60 mg by mouth daily. Hold during dialysis sessions    Provider, Historical   tamsulosin (FLOMAX) 0.4 mg capsule Take 0.4 mg by mouth nightly. Provider, Historical   simvastatin (ZOCOR) 40 mg tablet Take 40 mg by mouth nightly.     Provider, Historical     Allergies   Allergen Reactions    Augmentin [Amoxicillin-Pot Clavulanate] Hives    Penicillins Other (comments)     States skin peeling with penicillin    Zoster Vaccine Live Hives      Family History   Problem Relation Age of Onset   [address] [facility] Prabhakar Cancer Mother         \"bone\"   [address] [facility] Prabhakar Stroke Father         aneurysm    Hypertension Sister     Glaucoma Sister     Heart Disease Brother         STENT    Other Brother         ENLARGED PROSTATE    HIV/AIDS Brother         FROM A BLOOD TRANSFUSION    Heart Attack Brother     Anesth Problems Neg Hx         SOCIAL HISTORY:  Patient resides:  Independently x   Assisted Living    SNF    With family care       Smoking history:   None x   Former    Chronic      Alcohol history:   None    Social x   Chronic      Ambulates:   Independently    w/cane    w/walker    w/wc x   CODE STATUS:  DNR    Full x   Other      Objective:     Physical Exam:     Visit Vitals  /50 (BP 1 Location: Left arm, BP Patient Position: At rest)   Pulse (!) 103   Temp 99 °F (37.2 °C)   Resp 18   SpO2 98%      O2 Device: Room air    General : alert x 2, awake, no acute distress, resting in bed, pleasant male, appears to be stated age  [de-identified]: PEERL, EOMI, moist mucus membrane, TM clear  Neck: supple, no JVD, no meningeal signs  Chest: Clear to auscultation bilaterally   CVS: S1 S2 heard, Capillary refill less than 2 seconds  Abd: soft/ Non tender, non distended, BS physiological,   Ext: Left BKA, right foot ulcer on the heel with surrounding erythema, decreased DP pulses  Neuro/Psych: pleasant mood and affect, CN 2-12 grossly intact, trace bilateral lower extremity sensations, moves all 4 strength 5/5  Ski: warm       EKG: Sinus tachycardia with nonspecific ST changes.       Data Review:     Recent Days:  Recent Labs     08/12/20  1554   WBC 14.9*   HGB 8.7*   HCT 28.8*   *     Recent Labs     08/12/20  1554   *   K 3.4*   CL 95*   CO2 33*   *   BUN 15   CREA 3.47*   CA 9.9     No results for input(s): PH, PCO2, PO2, HCO3, FIO2 in the last 72 hours. 24 Hour Results:  Recent Results (from the past 24 hour(s))   CBC WITH AUTOMATED DIFF    Collection Time: 08/12/20  3:54 PM   Result Value Ref Range    WBC 14.9 (H) 4.1 - 11.1 K/uL    RBC 2.91 (L) 4.10 - 5.70 M/uL    HGB 8.7 (L) 12.1 - 17.0 g/dL    HCT 28.8 (L) 36.6 - 50.3 %    MCV 99.0 80.0 - 99.0 FL    MCH 29.9 26.0 - 34.0 PG    MCHC 30.2 30.0 - 36.5 g/dL    RDW 14.0 11.5 - 14.5 %    PLATELET 295 (H) 047 - 400 K/uL    MPV 9.4 8.9 - 12.9 FL    NRBC 0.0 0  WBC    ABSOLUTE NRBC 0.00 0.00 - 0.01 K/uL    NEUTROPHILS 87 (H) 32 - 75 %    LYMPHOCYTES 7 (L) 12 - 49 %    MONOCYTES 4 (L) 5 - 13 %    EOSINOPHILS 1 0 - 7 %    BASOPHILS 0 0 - 1 %    IMMATURE GRANULOCYTES 1 (H) 0.0 - 0.5 %    ABS. NEUTROPHILS 13.1 (H) 1.8 - 8.0 K/UL    ABS. LYMPHOCYTES 1.1 0.8 - 3.5 K/UL    ABS. MONOCYTES 0.6 0.0 - 1.0 K/UL    ABS. EOSINOPHILS 0.1 0.0 - 0.4 K/UL    ABS. BASOPHILS 0.0 0.0 - 0.1 K/UL    ABS. IMM. GRANS. 0.1 (H) 0.00 - 0.04 K/UL    DF AUTOMATED     METABOLIC PANEL, BASIC    Collection Time: 08/12/20  3:54 PM   Result Value Ref Range    Sodium 135 (L) 136 - 145 mmol/L    Potassium 3.4 (L) 3.5 - 5.1 mmol/L    Chloride 95 (L) 97 - 108 mmol/L    CO2 33 (H) 21 - 32 mmol/L    Anion gap 7 5 - 15 mmol/L    Glucose 220 (H) 65 - 100 mg/dL    BUN 15 6 - 20 MG/DL    Creatinine 3.47 (H) 0.70 - 1.30 MG/DL    BUN/Creatinine ratio 4 (L) 12 - 20      GFR est AA 22 (L) >60 ml/min/1.73m2    GFR est non-AA 18 (L) >60 ml/min/1.73m2    Calcium 9.9 8.5 - 10.1 MG/DL   LACTIC ACID    Collection Time: 08/12/20  3:54 PM   Result Value Ref Range    Lactic acid 0.9 0.4 - 2.0 MMOL/L   SAMPLES BEING HELD    Collection Time: 08/12/20  3:54 PM   Result Value Ref Range    SAMPLES BEING HELD AGUSTO RED 1BC(AGUSTO PUR)     COMMENT        Add-on orders for these samples will be processed based on acceptable specimen integrity and analyte stability, which may vary by analyte.    EKG, 12 LEAD, INITIAL    Collection Time: 08/12/20  4:09 PM   Result Value Ref Range    Ventricular Rate 104 BPM    Atrial Rate 104 BPM    P-R Interval 212 ms    QRS Duration 92 ms    Q-T Interval 344 ms    QTC Calculation (Bezet) 452 ms    Calculated P Axis 69 degrees    Calculated R Axis 12 degrees    Calculated T Axis 57 degrees    Diagnosis       Sinus tachycardia with 1st degree AV block  Septal infarct (cited on or before 17-JUL-2020)  When compared with ECG of 17-JUL-2020 15:20,  Questionable change in initial forces of Anteroseptal leads           Imaging:   Xr Calcaneus Rt    Result Date: 8/12/2020  IMPRESSION: There is soft tissue swelling posterior to the calcaneus with multiple bubbles of gas suspicious for infection/gas gangrene. Assessment/Plan      Right lower extremity cellulitis/gangrene with presumptive sepsis: Patient will be admitted on a telemetry bed, gentle IV hydration, broad-spectrum IV antibiotics, vascular surgery has been consulted, arterial studies have been ordered, lactate, blood cultures, supportive care and close monitoring further intervention per hospital course. ESRD: Patient received dialysis today,  Nephrology has been consulted    anemia of chronic disease: Monitor H&H    Hypokalemia: Monitor    Diabetes mellitus type 2: Poorly controlled, patient will be on sliding scale insulin, Accu-Cheks diet control and close monitoring.     GI DVT prophylaxis: Patient on heparin               Signed By: Rona Brunner MD     August 12, 2020

## 2020-08-12 NOTE — ED TRIAGE NOTES
Pt arrived via EMS from the [address] with a wound on bottom of R heel x1 month. Per EMS they had a difficult time finding a pulse. Pt also had recent amputation to R lower leg.

## 2020-08-12 NOTE — ED PROVIDER NOTES
57-year-old with peripheral vascular disease, coronary artery disease, diabetes, and end-stage renal disease on dialysis Monday Wednesday Friday who presents with increasing pain to the right heel. Patient is oriented to person place and time but gets confused when asking about the order of events that led to his arrival in the emergency department. He says that his right heel has been hurting him since last November and he is here because he cannot take the pain anymore but EMS reports that the wound has been there for a month. EMS had difficulty finding a pulse. Patient reports that his pain is 7 out of 10 and that he has not taken anything for his symptoms. The history is provided by the patient and the EMS personnel.         Past Medical History:   Diagnosis Date    CAD (coronary artery disease)      2014, MI, CABG    Chronic kidney disease     on dialysis MWF    Diabetes ([facility] [address]     ESRD (end stage renal disease) ([facility] [address] [facility]     Glaucoma     Heart failure ([facility] [address]     chronic diastolic HF per cardiology note    Hyperlipidemia     Hypertension     Ill-defined condition     overweight BMI 27.2    PAD (peripheral artery disease) ([facility] [address] [facility]     Stroke [facility]) August 2011    residual left side weakness stroke x 2 per cardiology note       Past Surgical History:   Procedure Laterality Date    CARDIAC SURG PROCEDURE UNLIST      cavbg 2014 x3 VESSELS    HX CHOLECYSTECTOMY  02/2020    HX CORONARY ARTERY BYPASS GRAFT  February 2014    HX HEART CATHETERIZATION  01/24/2014    HX OTHER SURGICAL Left 06/2016    last toe on left foot amputated, for nonhealing toe ulcer    HX VASCULAR ACCESS Right Brenden Catheter    removed when fistula healed    VASCULAR SURGERY PROCEDURE UNLIST      fistula left arm         Family History:   Problem Relation Age of Onset    Cancer Mother         \"bone\"   [facility] Stroke Father         aneurysm    Hypertension Sister     Glaucoma Sister     Heart Disease Brother         STENT    Other Brother         ENLARGED PROSTATE    HIV/AIDS Brother         FROM A BLOOD TRANSFUSION    Heart Attack Brother     Anesth Problems Neg Hx        Social History     Socioeconomic History    Marital status: SINGLE     Spouse name: Not on file    Number of children: Not on file    Years of education: Not on file    Highest education level: Not on file   Occupational History    Not on file   Social Needs    Financial resource strain: Not on file    Food insecurity     Worry: Not on file     Inability: Not on file    Transportation needs     Medical: Not on file     Non-medical: Not on file   Tobacco Use    Smoking status: Never Smoker    Smokeless tobacco: Never Used   Substance and Sexual Activity    Alcohol use: No    Drug use: No    Sexual activity: Not on file   Lifestyle    Physical activity     Days per week: Not on file     Minutes per session: Not on file    Stress: Not on file   Relationships    Social connections     Talks on phone: Not on file     Gets together: Not on file     Attends Oriental orthodox service: Not on file     Active member of club or organization: Not on file     Attends meetings of clubs or organizations: Not on file     Relationship status: Not on file    Intimate partner violence     Fear of current or ex partner: Not on file     Emotionally abused: Not on file     Physically abused: Not on file     Forced sexual activity: Not on file   Other Topics Concern    Not on file   Social History Narrative    Not on file         ALLERGIES: Augmentin [amoxicillin-pot clavulanate]; Penicillins; and Zoster vaccine live    Review of Systems   Unable to perform ROS: Dementia       Vitals:    08/12/20 1538   BP: 148/50   Pulse: (!) 103   Resp: 18   Temp: 99 °F (37.2 °C)   SpO2: 98%            Physical Exam  Vitals signs and nursing note reviewed. Constitutional:       General: He is not in acute distress. Appearance: He is well-developed.    HENT:      Head: Normocephalic and atraumatic. Eyes:      Conjunctiva/sclera: Conjunctivae normal.      Pupils: Pupils are equal, round, and reactive to light. Neck:      Musculoskeletal: Normal range of motion. Cardiovascular:      Rate and Rhythm: Regular rhythm. Tachycardia present. Pulmonary:      Effort: Pulmonary effort is normal.      Breath sounds: Normal breath sounds. Abdominal:      General: Abdomen is flat. There is no distension. Palpations: Abdomen is soft. Tenderness: There is no abdominal tenderness. Musculoskeletal: Normal range of motion. Comments: Left BKA, incision closed by staples   Skin:     General: Skin is warm and dry. Capillary Refill: Capillary refill takes less than 2 seconds. Findings: Wound present. Comments: Eschar to heel of right foot   Neurological:      General: No focal deficit present. Mental Status: He is alert and oriented to person, place, and time. Psychiatric:         Mood and Affect: Mood normal.         Behavior: Behavior normal.         Cognition and Memory: Memory is impaired. He exhibits impaired recent memory and impaired remote memory. MDM  Number of Diagnoses or Management Options  Chronic heel ulcer, right, with unspecified severity ([address]): new and requires workup  Necrotizing cellulitis: new and requires workup  Sepsis without acute organ dysfunction, due to unspecified organism [facility]): new and requires workup  Diagnosis management comments: DDX: Sepsis, cellulitis, dry gangrene, gas gangrene, osteomyelitis, necrotizing cellulitis, septic shock         Procedures        Patient is being admitted to the hospital.  The results of their tests and reasons for their admission have been discussed with them and/or available family. They convey agreement and understanding for the need to be admitted and for their admission diagnosis. Consultation will be made now with the inpatient physician for hospitalization.     Hospitalist Barney Serve for Admission  5:47 PM    ED Room Number: ER19/19  Patient Name and age:  Pj Gregorio 75 y.o.  male  Working Diagnosis:   1. Necrotizing cellulitis    2. Sepsis without acute organ dysfunction, due to unspecified organism ([facility] [address]    3.  Chronic heel ulcer, right, with unspecified severity ([facility] [address]      COVID-19 Suspicion:  no but coming from communal living    Code Status:  Do Not Resuscitate  Readmission: yes  Isolation Requirements:  no  Recommended Level of Care:  telemetry  Department:[facility] Adult ED - 21   Other:  Vascular surgery did left BKA and they are aware of the gas gangrene of right heel, they saw the patient in the ED

## 2020-08-12 NOTE — PROGRESS NOTES
Patient of Dr Spring Nails  Had Left BKA a few weeks ago  Has pressure wound to right heel  Foot warm  PVRs normal in the past but done a few years ago  Will order noninvasive studies for tomorrow  Dr Kitty Vasquez to see

## 2020-08-12 NOTE — PROGRESS NOTES
Day #1 of cefepime   Indication:  SSTI  Current regimen:  2 gram Q8H  Abx regimen: cefepime + clindamycin  Recent Labs     20  1554   WBC 14.9*   CREA 3.47*   BUN 15     Est CrCl: ESRD on HD   Temp (24hrs), Av °F (37.2 °C), Min:99 °F (37.2 °C), Max:99 °F (37.2 °C)    Cultures:    blood, pending    Plan: Change to cefepime 1 gram Q24H for SSTI for ESRD on HD

## 2020-08-12 NOTE — PROGRESS NOTES
Pharmacist Note - Vancomycin Dosing (Hemodialysis Patient)    Consult provided for this 75 y.o. male for indication of skin and soft tissue infection.  XR: There is soft tissue swelling posterior to the calcaneus with multiple bubbles of gas suspicious for infection/gas gangrene  This patient is also on the following antibiotic regimen(s): vancomycin + cefepime    Lab Results   Component Value Date/Time    Creatinine 3.47 (H) 2020 03:54 PM    Creatinine (POC) 4.7 (H) 2017 01:12 PM    WBC 14.9 (H) 2020 03:54 PM    BUN 15 2020 03:54 PM    BUN (POC) 37 (H) 2017 01:12 PM   Temp (24hrs), Av °F (37.2 °C), Min:99 °F (37.2 °C), Max:99 °F (37.2 °C)      Estimated CrCl:  ESRD on HD (Monday/Wednesday/Friday)  Cultures:   blood, pending  For this HD patient, will initiate therapy with a loading dose of Vancomycin 1750 mg, to be followed with a dose of 750 mg after each HD session. Dose will be adjusted to maintain a pre-HD trough of approximately 15 - 20 mcg/mL for therapeutic goal of 10 - 15 mcg/mL as approximately 35% of drug will be removed by HD filtration. Pharmacy to follow patient daily and order levels / make dose adjustments as appropriate.

## 2020-08-12 NOTE — ED NOTES
TRANSFER - OUT REPORT:    Verbal report given to LILA Zapata (name) on Cassius Parham  being transferred to ,  (unit) for routine progression of care       Report consisted of patients Situation, Background, Assessment and   Recommendations(SBAR). Information from the following report(s) SBAR, ED Summary and MAR was reviewed with the receiving nurse. Lines:   Peripheral IV 08/12/20 Right Antecubital (Active)   Site Assessment Clean, dry, & intact 08/12/20 1541   Phlebitis Assessment 0 08/12/20 1541   Infiltration Assessment 0 08/12/20 1541   Dressing Status Clean, dry, & intact 08/12/20 1541        Opportunity for questions and clarification was provided.       Patient transported with:   [facility]

## 2020-08-13 ENCOUNTER — APPOINTMENT (OUTPATIENT)
Dept: VASCULAR SURGERY | Age: 65
DRG: 853 | End: 2020-08-13
Payer: MEDICARE

## 2020-08-13 LAB
ALBUMIN SERPL-MCNC: 2.5 G/DL (ref 3.5–5)
ALBUMIN/GLOB SERPL: 0.4 {RATIO} (ref 1.1–2.2)
ALP SERPL-CCNC: 103 U/L (ref 45–117)
ALT SERPL-CCNC: 19 U/L (ref 12–78)
ANION GAP SERPL CALC-SCNC: 7 MMOL/L (ref 5–15)
AST SERPL-CCNC: 26 U/L (ref 15–37)
ATRIAL RATE: 104 BPM
BASOPHILS # BLD: 0 K/UL (ref 0–0.1)
BASOPHILS NFR BLD: 0 % (ref 0–1)
BILIRUB SERPL-MCNC: 0.5 MG/DL (ref 0.2–1)
BUN SERPL-MCNC: 22 MG/DL (ref 6–20)
BUN/CREAT SERPL: 5 (ref 12–20)
CALCIUM SERPL-MCNC: 9.1 MG/DL (ref 8.5–10.1)
CALCULATED P AXIS, ECG09: 69 DEGREES
CALCULATED R AXIS, ECG10: 12 DEGREES
CALCULATED T AXIS, ECG11: 57 DEGREES
CHLORIDE SERPL-SCNC: 96 MMOL/L (ref 97–108)
CO2 SERPL-SCNC: 30 MMOL/L (ref 21–32)
CREAT SERPL-MCNC: 4.54 MG/DL (ref 0.7–1.3)
DIAGNOSIS, 93000: NORMAL
DIFFERENTIAL METHOD BLD: ABNORMAL
EOSINOPHIL # BLD: 0.1 K/UL (ref 0–0.4)
EOSINOPHIL NFR BLD: 1 % (ref 0–7)
ERYTHROCYTE [DISTWIDTH] IN BLOOD BY AUTOMATED COUNT: 14.1 % (ref 11.5–14.5)
EST. AVERAGE GLUCOSE BLD GHB EST-MCNC: 108 MG/DL
GLOBULIN SER CALC-MCNC: 5.6 G/DL (ref 2–4)
GLUCOSE BLD STRIP.AUTO-MCNC: 211 MG/DL (ref 65–100)
GLUCOSE BLD STRIP.AUTO-MCNC: 221 MG/DL (ref 65–100)
GLUCOSE BLD STRIP.AUTO-MCNC: 241 MG/DL (ref 65–100)
GLUCOSE BLD STRIP.AUTO-MCNC: 255 MG/DL (ref 65–100)
GLUCOSE SERPL-MCNC: 219 MG/DL (ref 65–100)
HBA1C MFR BLD: 5.4 % (ref 4–5.6)
HCT VFR BLD AUTO: 26.3 % (ref 36.6–50.3)
HGB BLD-MCNC: 7.9 G/DL (ref 12.1–17)
IMM GRANULOCYTES # BLD AUTO: 0.1 K/UL (ref 0–0.04)
IMM GRANULOCYTES NFR BLD AUTO: 1 % (ref 0–0.5)
LYMPHOCYTES # BLD: 1.2 K/UL (ref 0.8–3.5)
LYMPHOCYTES NFR BLD: 8 % (ref 12–49)
MCH RBC QN AUTO: 29.8 PG (ref 26–34)
MCHC RBC AUTO-ENTMCNC: 30 G/DL (ref 30–36.5)
MCV RBC AUTO: 99.2 FL (ref 80–99)
MONOCYTES # BLD: 0.9 K/UL (ref 0–1)
MONOCYTES NFR BLD: 6 % (ref 5–13)
NEUTS SEG # BLD: 12.7 K/UL (ref 1.8–8)
NEUTS SEG NFR BLD: 84 % (ref 32–75)
NRBC # BLD: 0 K/UL (ref 0–0.01)
NRBC BLD-RTO: 0 PER 100 WBC
P-R INTERVAL, ECG05: 212 MS
PLATELET # BLD AUTO: 461 K/UL (ref 150–400)
PMV BLD AUTO: 9.3 FL (ref 8.9–12.9)
POTASSIUM SERPL-SCNC: 3.3 MMOL/L (ref 3.5–5.1)
PROT SERPL-MCNC: 8.1 G/DL (ref 6.4–8.2)
Q-T INTERVAL, ECG07: 344 MS
QRS DURATION, ECG06: 92 MS
QTC CALCULATION (BEZET), ECG08: 452 MS
RBC # BLD AUTO: 2.65 M/UL (ref 4.1–5.7)
SERVICE CMNT-IMP: ABNORMAL
SODIUM SERPL-SCNC: 133 MMOL/L (ref 136–145)
VENTRICULAR RATE, ECG03: 104 BPM
WBC # BLD AUTO: 14.9 K/UL (ref 4.1–11.1)

## 2020-08-13 PROCEDURE — 65660000000 HC RM CCU STEPDOWN

## 2020-08-13 PROCEDURE — 80053 COMPREHEN METABOLIC PANEL: CPT

## 2020-08-13 PROCEDURE — 87040 BLOOD CULTURE FOR BACTERIA: CPT

## 2020-08-13 PROCEDURE — 82962 GLUCOSE BLOOD TEST: CPT

## 2020-08-13 PROCEDURE — 36415 COLL VENOUS BLD VENIPUNCTURE: CPT

## 2020-08-13 PROCEDURE — 74011250636 HC RX REV CODE- 250/636: Performed by: FAMILY MEDICINE

## 2020-08-13 PROCEDURE — 94760 N-INVAS EAR/PLS OXIMETRY 1: CPT

## 2020-08-13 PROCEDURE — 83036 HEMOGLOBIN GLYCOSYLATED A1C: CPT

## 2020-08-13 PROCEDURE — 74011250637 HC RX REV CODE- 250/637: Performed by: FAMILY MEDICINE

## 2020-08-13 PROCEDURE — 85025 COMPLETE CBC W/AUTO DIFF WBC: CPT

## 2020-08-13 PROCEDURE — 74011636637 HC RX REV CODE- 636/637: Performed by: FAMILY MEDICINE

## 2020-08-13 PROCEDURE — 93923 UPR/LXTR ART STDY 3+ LVLS: CPT

## 2020-08-13 PROCEDURE — 0JBQ0ZZ EXCISION OF RIGHT FOOT SUBCUTANEOUS TISSUE AND FASCIA, OPEN APPROACH: ICD-10-PCS

## 2020-08-13 PROCEDURE — 74011000258 HC RX REV CODE- 258: Performed by: FAMILY MEDICINE

## 2020-08-13 RX ADMIN — ACETAMINOPHEN 650 MG: 325 TABLET ORAL at 08:29

## 2020-08-13 RX ADMIN — ATORVASTATIN CALCIUM 20 MG: 20 TABLET, FILM COATED ORAL at 08:29

## 2020-08-13 RX ADMIN — HEPARIN SODIUM 5000 UNITS: 5000 INJECTION INTRAVENOUS; SUBCUTANEOUS at 21:58

## 2020-08-13 RX ADMIN — INSULIN LISPRO 2 UNITS: 100 INJECTION, SOLUTION INTRAVENOUS; SUBCUTANEOUS at 22:03

## 2020-08-13 RX ADMIN — INSULIN LISPRO 2 UNITS: 100 INJECTION, SOLUTION INTRAVENOUS; SUBCUTANEOUS at 16:52

## 2020-08-13 RX ADMIN — CEFEPIME HYDROCHLORIDE 1 G: 1 INJECTION, POWDER, FOR SOLUTION INTRAMUSCULAR; INTRAVENOUS at 17:44

## 2020-08-13 RX ADMIN — TAMSULOSIN HYDROCHLORIDE 0.4 MG: 0.4 CAPSULE ORAL at 21:58

## 2020-08-13 RX ADMIN — INSULIN LISPRO 2 UNITS: 100 INJECTION, SOLUTION INTRAVENOUS; SUBCUTANEOUS at 07:13

## 2020-08-13 RX ADMIN — INSULIN LISPRO 2 UNITS: 100 INJECTION, SOLUTION INTRAVENOUS; SUBCUTANEOUS at 12:35

## 2020-08-13 RX ADMIN — SODIUM CHLORIDE 75 ML/HR: 900 INJECTION, SOLUTION INTRAVENOUS at 16:54

## 2020-08-13 RX ADMIN — CLOPIDOGREL BISULFATE 75 MG: 75 TABLET ORAL at 08:29

## 2020-08-13 RX ADMIN — HEPARIN SODIUM 5000 UNITS: 5000 INJECTION INTRAVENOUS; SUBCUTANEOUS at 07:13

## 2020-08-13 RX ADMIN — INSULIN GLARGINE 16 UNITS: 100 INJECTION, SOLUTION SUBCUTANEOUS at 08:29

## 2020-08-13 RX ADMIN — HEPARIN SODIUM 5000 UNITS: 5000 INJECTION INTRAVENOUS; SUBCUTANEOUS at 12:36

## 2020-08-13 NOTE — PROGRESS NOTES
Received antibiotics as ordered, Blood cultures were not drawn in ED. X2 attempts by RNs unsuccessful. PIV access in right [facility] and limb alert in Left arm for Dialysis access.  CCU consulted to assist with obtaining Blood Culture

## 2020-08-13 NOTE — WOUND CARE
WOCN Note:  
 
New consult for leg. Chart shows: 
Admitted for heel ulcer and has been seen by Dr. Angel Peter with wound care orders in place. History of DM, CVA, left BKA Assessment:  
Communicative and reports no pain. Assisted turn left & right for incont care of soft stool with RN and fresh brief applied. Bed: total care with foam mattress Buttocks and sacral skin intact and without erythema. Barrier cream applied. Right heel offloaded with pillows. Available as needed per Dr. Angel Peter. ARYA GarciaN, RN, [facility] Certified Wound, Ostomy, Continence Nurse 
office [phone] 
pager [phone] or call  to page

## 2020-08-13 NOTE — CONSULTS
Dialysis pt followed by Dr Edward Ferguson   Will see him later this morning     Wayne Pablo MD  [facility]  Office :[phone]  Fax: [phone]

## 2020-08-13 NOTE — PROGRESS NOTES
Vascular:    Patient with right heel decubitus wound that has been present for several months. Recent left BKA. The right heel wound has been dry and uninfected but has now worsened - black eschar excised - wet, necrotic fat present and debrided. Based on his overall condition this wound is unlikely to heal and will result in BKA. This was discussed with him. Will discuss with his sister. In meantime plan wound care and antibiotics. The nonviable tissue was excised into the subcutaneous layer - this represents an excisional debridement.

## 2020-08-13 NOTE — PROGRESS NOTES
Problem: Diabetes Self-Management  Goal: *Disease process and treatment process  Description: Define diabetes and identify own type of diabetes; list 3 options for treating diabetes. Outcome: Progressing Towards Goal  Goal: *Incorporating nutritional management into lifestyle  Description: Describe effect of type, amount and timing of food on blood glucose; list 3 methods for planning meals. Outcome: Progressing Towards Goal     Problem: Falls - Risk of  Goal: *Absence of Falls  Description: Document Markell Stanley Fall Risk and appropriate interventions in the flowsheet.   Outcome: Progressing Towards Goal  Note: Fall Risk Interventions:  Mobility Interventions: Bed/chair exit alarm    Mentation Interventions: Door open when patient unattended, Bed/chair exit alarm, Adequate sleep, hydration, pain control         Elimination Interventions: Call light in reach              Problem: Discharge Planning  Goal: *Discharge to safe environment  Outcome: Progressing Towards Goal     Problem: Discharge Planning  Goal: *Discharge to safe environment  Outcome: Progressing Towards Goal

## 2020-08-13 NOTE — CDMP QUERY
Query 1 of 1 Patient admitted with RLE cellulitis and pressure ulcer to right heel. Noted documentation of 'DM 2: poorly controlled'. If possible, please document in progress notes and discharge summary the relationship, if any, between cellulitis and DM. ? Cellulitis due to Diabetes ? Cellulitis unrelated to Diabetes ? Other ? Clinically unable to determine The medical record reflects the following: 
 
  Risk Factors: DM2, PAD, HTN Clinical Indicators:  
 
a1c 5.4 Avg  Serum Glu 220-> 219 H&P- 
Right lower extremity cellulitis/gangrene with presumptive sepsis: Patient will be admitted on a telemetry bed, gentle IV hydration, broad-spectrum IV antibiotics, vascular surgery has been consulted, arterial studies have been ordered, lactate, blood cultures, supportive care and close monitoring further intervention per hospital course. Diabetes mellitus type 2: Poorly controlled, patient will be on sliding scale insulin, Accu-Cheks diet control and close monitoring. Ext: Left BKA, right foot ulcer on the heel with surrounding erythema, decreased DP pulses 
  
  Treatment: Vasc consult; Lantus 16u daily; Humalog SSI; Cefepime 1g IV daily Thank you, Cindy Mandujano, RN, BSN Clinical  
[facility] 
[phone]

## 2020-08-13 NOTE — PROGRESS NOTES
13:49  Spoke with Flaquito Saba of Dr. Chip Nielson office. Amputation surgery will be scheduled for Monday 8/17 per Eusebia. Notified [address]  at [facility] that pt will not be undergoing surgery Friday and dialysis can be performed as scheduled.

## 2020-08-13 NOTE — CONSULTS
[address]    Name:  Katarina Desai  MR#:  [id number]  :  1955  ACCOUNT #:  [de-identified]  DATE OF SERVICE:  2020      REASON FOR CONSULTATION:  Right heel wound and peripheral vascular disease. HISTORY OF PRESENT ILLNESS:  The patient is a 55-year-old male with diabetes and end-stage renal disease on hemodialysis, who presented to the emergency room with a worsening right heel wound. The patient is well known to me and had a left below-knee amputation 3 weeks ago for a nonhealing left foot wound. He has had a right heel decubitus ulcer for several months that has been stable. He was admitted with worsening pain in his foot. He has been residing in a skilled nursing facility since his amputation. PAST MEDICAL HISTORY:  Renal failure on dialysis, diabetes, hypertension, coronary artery disease with previous coronary bypass, previous stroke. PAST SURGICAL HISTORY:  Coronary artery bypass, cholecystectomy, dialysis graft placement. MEDICATIONS ON ADMISSION:  Insulin, Plavix, trazodone, isosorbide, Flomax, Zocor. ALLERGIES:  PENICILLIN. SOCIAL HISTORY:  The patient's sister is his closest family member and helps him with his medical decision, he is currently living in a skilled nursing facility. FAMILY HISTORY:  Unremarkable. REVIEW OF SYSTEMS:  He has had no recent cardiac, respiratory, GI,  or neurologic complaints. PHYSICAL EXAMINATION:  GENERAL:  He is awake, alert and responsive and in no distress. LUNGS:  Bilateral breath sounds. HEART:  Regular rate and rhythm. ABDOMEN:  Soft and nontender. EXTREMITIES:  He has a well-healing left below-knee amputation incision with the staples remaining in place. On the right, he has a 5 cm black eschar on the posterior aspect of his right heel. The eschar was excised. There is necrotic infected fat beneath the eschar. This too was debrided and partially excised.   The nonviable fat extends further posteriorly and was not able to be excised in its entirety. Peripheral vascular exam, he has palpable femoral pulses but no pulses distally. NEUROLOGIC:  Grossly normal with intact motor and sensory function. IMPRESSION:  The patient has a fairly large right heel wound. Based on his overall condition with diabetes, renal failure and some degree of vascular disease, it is unlikely that this wound will heal or that his foot is salvageable. This was discussed with the patient and his sister. I suspect he will come to a below-knee amputation eventually whether he does it now or later. The wound could be managed without amputation for some period of time, but it will require further debridement. He has vascular studies pending today. Wound care has been ordered. He will continue antibiotics and we will make further decisions after further discussion with him.       Sumeet Corado MD      [id number]  D:  08/13/2020 11:26  T:  08/13/2020 11:49  JOB #:  [id number]

## 2020-08-13 NOTE — CDMP QUERY
Query 1 of 1 Patient admitted with RLE cellulitis from wound on heel. Per unsigned PN on 8/13, 'On the right, he has a 5 cm black eschar on the posterior aspect of his right heel. The eschar was excised. There is necrotic infected fat beneath the eschar. This too was debrided and partially excised'. To accurately reflect the procedure performed please addend the note to include: ? Deepest depth of tissue removed as down to and including skin, subcutaneous, fascia, muscle, tendon/joint, bone, etc., as appropriate ? Excisional debridement = cutting off or away--without replacement--devitalized tissue, necrosis or slough ? Non-excisional debridement = brushing, irrigating, scrubbing or washing of devitalized tissue, necrosis or slough Ex. Minor removal of loose fragments, scraping, whirlpool, mechanical or pulsatile lavage, irrigation ? Type of instrument used The medical record reflects the following: 
  Risk Factors: PVD, DM2 Clinical Indicators: On the right, he has a 5 cm black eschar on the posterior aspect of his right heel. The eschar was excised. There is necrotic infected fat beneath the eschar. This too was debrided and partially excised'. Treatment: Vasc consult; bedside debridement; wound care consult pending; 
Clindamycin 600mg IV daily; Merrem 500mg IV daily Thank you, Jefry Vegas RN, BSN Clinical  
[facility] 
[phone]

## 2020-08-13 NOTE — ROUTINE PROCESS
Bedside and Verbal shift change report given to Jose Daniel Sanchez (oncoming nurse) by Cahndler Luna (offgoing nurse). Report included the following information SBAR, Kardex, ED Summary, MAR, Recent Results and Cardiac Rhythm NSR.

## 2020-08-13 NOTE — PROGRESS NOTES
JJ:    RUR 16%    Patient lives at [place] [address]. He will need Medicaid transportation at discharge. Care Management Interventions  PCP Verified by CM: Yes  Mode of Transport at Discharge: BLS  MyChart Signup: No  Discharge Durable Medical Equipment: No  Physical Therapy Consult: Yes  Occupational Therapy Consult: Yes  Speech Therapy Consult: No  Current Support Network: [address] [place] [address]  Discharge Location  Discharge Placement: [address]     Reason for Admission:   Right foot ulcer                   RUR Score:     16%                Plan for utilizing home health:    No orders      PCP: First and Last name:  Liza Jones   Name of Practice: Unknown   Are you a current patient: Yes/No: Yes   Approximate date of last visit: 1 month ago   Can you participate in a virtual visit with your PCP: Unknown                    Current Advanced Directive/Advance Care Plan: On file. SisterNika is MPOA. Transition of Care Plan:      CM met with patient to introduce CM role, verify demographics and begin discharge planning. Patient lives at [facility] of [address] [place] [address]. He does not ambulate but transfers to a chair or a wheelchair. Patient gets dialysis at [address] [place] [address] [facility]   M/W[facility]F. He uses Medicaid BLS transportation to get to dialysis. Insurance: [facility] Medicare A&B and [facility] Medicaid secondary.     Karel Dominguez, RN/CRM

## 2020-08-13 NOTE — PROGRESS NOTES
[facility]   [address] [facility], [address] [place] [address] [address], [facility]  Phone: [phone]   [phone]       Nephrology Progress Note  Blanca Appiah     1955     [id number]  Date of Admission : 8/12/2020 08/13/20    CC: Follow up for ESRD     Assessment and Plan   ESRD- HD   - dialyzes MWF @ [address] . F/B Dr Mary Grace Pineda  - Next HD tomorrow   - will plan dialysis post vascular surgery/ amputation   - ordered type and screen as he may need RBC  Transfusion post op     PVD   - s/p Left BKA 7/20   - Now with R heel ulcer : for BKA ? Tomorrow     Anemia in CKD   - ordered ARSALAN  - check iron profile   - Type and screen     HTN   - continue home meds     Sec HPTH   - continue Binders     Type II DM - per Primary team      Interval History:  Mr Virgie Marsh was admitted post dialysis yesterday with worsening pain from non healing R ankle ulcer. He has been seen by vascular and he just had duplex studies. Pt telle me that he will be getting BKA tomorrow. Denies any recent problems from dialysis   He has LUE AVF for access      Review of Systems: A comprehensive review of systems was negative except for that written in the HPI.     Current Medications:   Current Facility-Administered Medications   Medication Dose Route Frequency    cefepime (MAXIPIME) 1 g in 0.9% sodium chloride (MBP/ADV) 50 mL  1 g IntraVENous Q24H    [START ON 8/14/2020] epoetin dyan-epbx (RETACRIT) injection 20,000 Units  20,000 Units SubCUTAneous Q MON, WED & FRI    clopidogreL (PLAVIX) tablet 75 mg  75 mg Oral DAILY    insulin glargine (LANTUS) injection 16 Units  16 Units SubCUTAneous DAILY    isosorbide mononitrate ER (IMDUR) tablet 60 mg  60 mg Oral DAILY    latanoprost (XALATAN) 0.005 % ophthalmic solution 1 Drop  1 Drop Both Eyes QHS    atorvastatin (LIPITOR) tablet 20 mg  20 mg Oral DAILY    tamsulosin (FLOMAX) capsule 0.4 mg  0.4 mg Oral QHS    sodium chloride (NS) flush 5-40 mL  5-40 mL IntraVENous Q8H    sodium chloride (NS) flush 5-40 mL  5-40 mL IntraVENous PRN    0.9% sodium chloride infusion  75 mL/hr IntraVENous CONTINUOUS    acetaminophen (TYLENOL) tablet 650 mg  650 mg Oral Q4H PRN    heparin (porcine) injection 5,000 Units  5,000 Units SubCUTAneous Q8H    glucose chewable tablet 16 g  4 Tab Oral PRN    glucagon (GLUCAGEN) injection 1 mg  1 mg IntraMUSCular PRN    dextrose 10% infusion 0-250 mL  0-250 mL IntraVENous PRN    Vancomycin- Pharmacy to Dose    Other Rx Dosing/Monitoring    insulin lispro (HUMALOG) injection   SubCUTAneous AC&HS      Allergies   Allergen Reactions    Augmentin [Amoxicillin-Pot Clavulanate] Hives    Penicillins Other (comments)     States skin peeling with penicillin    Zoster Vaccine Live Hives       Objective:  Vitals:    Vitals:    08/13/20 0745 08/13/20 0747 08/13/20 0753 08/13/20 1223   BP: (!) 122/36 (!) 115/37 (!) 114/33 (!) 113/33   Pulse: 88  86 82   Resp: 16  16 16   Temp: 99.5 °F (37.5 °C)  98.8 °F (37.1 °C) 99 °F (37.2 °C)   SpO2: 94%  99%    Weight:       Height:         Intake and Output:  08/13 0701 - 08/13 1900  In: 120 [P.O.:120]  Out: -   08/11 1901 - 08/13 0700  In: 61 [P.O.:60]  Out: -     Physical Examination:    General: NAD,Conversant   Neck:  Supple, no mass  Resp:  Lungs CTA B/L, no wheezing , normal respiratory effort  CV:  RRR,  no murmur or rub,trace LE edema  GI:  Soft, NT, + Bowel sounds, no hepatosplenomegaly  Neurologic:  Non focal  Psych:             AAO x 3 appropriate affect   Ext                  Left BKA. R heel in dressing   Access          LUE AVF + thrill     []    High complexity decision making was performed  []    Patient is at high-risk of decompensation with multiple organ involvement    Lab Data Personally Reviewed: I have reviewed all the pertinent labs, microbiology data and radiology studies during assessment.     Recent Labs     08/13/20  0032 08/12/20  1554   * 135*   K 3.3* 3.4*   CL 96* 95*   CO2 30 33*   * 220*   BUN 22* 15 CREA 4.54* 3.47*   CA 9.1 9.9   ALB 2.5*  --    ALT 19  --      Recent Labs     08/13/20  0032 08/12/20  1554   WBC 14.9* 14.9*   HGB 7.9* 8.7*   HCT 26.3* 28.8*   * 490*     No results found for: SDES  Lab Results   Component Value Date/Time    Culture result: NO GROWTH AFTER 1 HOUR 08/13/2020 12:32 AM    Culture result: NO GROWTH AFTER 11 HOURS 08/12/2020 03:55 PM    Culture result: SCANT  MIXED SKIN MAHESH ISOLATED   03/17/2017 03:08 PM    Culture result: NO ANAEROBES ISOLATED 03/17/2017 03:08 PM    Culture result: NO GROWTH 2 DAYS 06/24/2016 10:25 PM     Recent Results (from the past 24 hour(s))   CBC WITH AUTOMATED DIFF    Collection Time: 08/12/20  3:54 PM   Result Value Ref Range    WBC 14.9 (H) 4.1 - 11.1 K/uL    RBC 2.91 (L) 4.10 - 5.70 M/uL    HGB 8.7 (L) 12.1 - 17.0 g/dL    HCT 28.8 (L) 36.6 - 50.3 %    MCV 99.0 80.0 - 99.0 FL    MCH 29.9 26.0 - 34.0 PG    MCHC 30.2 30.0 - 36.5 g/dL    RDW 14.0 11.5 - 14.5 %    PLATELET 835 (H) 320 - 400 K/uL    MPV 9.4 8.9 - 12.9 FL    NRBC 0.0 0  WBC    ABSOLUTE NRBC 0.00 0.00 - 0.01 K/uL    NEUTROPHILS 87 (H) 32 - 75 %    LYMPHOCYTES 7 (L) 12 - 49 %    MONOCYTES 4 (L) 5 - 13 %    EOSINOPHILS 1 0 - 7 %    BASOPHILS 0 0 - 1 %    IMMATURE GRANULOCYTES 1 (H) 0.0 - 0.5 %    ABS. NEUTROPHILS 13.1 (H) 1.8 - 8.0 K/UL    ABS. LYMPHOCYTES 1.1 0.8 - 3.5 K/UL    ABS. MONOCYTES 0.6 0.0 - 1.0 K/UL    ABS. EOSINOPHILS 0.1 0.0 - 0.4 K/UL    ABS. BASOPHILS 0.0 0.0 - 0.1 K/UL    ABS. IMM.  GRANS. 0.1 (H) 0.00 - 0.04 K/UL    DF AUTOMATED     METABOLIC PANEL, BASIC    Collection Time: 08/12/20  3:54 PM   Result Value Ref Range    Sodium 135 (L) 136 - 145 mmol/L    Potassium 3.4 (L) 3.5 - 5.1 mmol/L    Chloride 95 (L) 97 - 108 mmol/L    CO2 33 (H) 21 - 32 mmol/L    Anion gap 7 5 - 15 mmol/L    Glucose 220 (H) 65 - 100 mg/dL    BUN 15 6 - 20 MG/DL    Creatinine 3.47 (H) 0.70 - 1.30 MG/DL    BUN/Creatinine ratio 4 (L) 12 - 20      GFR est AA 22 (L) >60 ml/min/1.73m2    GFR est non-AA 18 (L) >60 ml/min/1.73m2    Calcium 9.9 8.5 - 10.1 MG/DL   LACTIC ACID    Collection Time: 08/12/20  3:54 PM   Result Value Ref Range    Lactic acid 0.9 0.4 - 2.0 MMOL/L   SAMPLES BEING HELD    Collection Time: 08/12/20  3:54 PM   Result Value Ref Range    SAMPLES BEING HELD AGUSTO RED 1BC(AGUSTO PUR)     COMMENT        Add-on orders for these samples will be processed based on acceptable specimen integrity and analyte stability, which may vary by analyte.    HEMOGLOBIN A1C WITH EAG    Collection Time: 08/12/20  3:54 PM   Result Value Ref Range    Hemoglobin A1c 5.4 4.0 - 5.6 %    Est. average glucose 108 mg/dL   CULTURE, BLOOD    Collection Time: 08/12/20  3:55 PM    Specimen: Blood   Result Value Ref Range    Special Requests: NO SPECIAL REQUESTS      Culture result: NO GROWTH AFTER 11 HOURS     EKG, 12 LEAD, INITIAL    Collection Time: 08/12/20  4:09 PM   Result Value Ref Range    Ventricular Rate 104 BPM    Atrial Rate 104 BPM    P-R Interval 212 ms    QRS Duration 92 ms    Q-T Interval 344 ms    QTC Calculation (Bezet) 452 ms    Calculated P Axis 69 degrees    Calculated R Axis 12 degrees    Calculated T Axis 57 degrees    Diagnosis       Sinus tachycardia with 1st degree AV block  Septal infarct (cited on or before 17-JUL-2020)  When compared with ECG of 17-JUL-2020 15:20,  Questionable change in initial forces of Anteroseptal leads  Confirmed by Franklyn Rdz ([id number]) on 8/13/2020 6:04:57 AM     GLUCOSE, POC    Collection Time: 08/12/20  9:52 PM   Result Value Ref Range    Glucose (POC) 206 (H) 65 - 100 mg/dL    Performed by [employer]    METABOLIC PANEL, COMPREHENSIVE    Collection Time: 08/13/20 12:32 AM   Result Value Ref Range    Sodium 133 (L) 136 - 145 mmol/L    Potassium 3.3 (L) 3.5 - 5.1 mmol/L    Chloride 96 (L) 97 - 108 mmol/L    CO2 30 21 - 32 mmol/L    Anion gap 7 5 - 15 mmol/L    Glucose 219 (H) 65 - 100 mg/dL    BUN 22 (H) 6 - 20 MG/DL    Creatinine 4.54 (H) 0.70 - 1.30 MG/DL    BUN/Creatinine ratio 5 (L) 12 - 20      GFR est AA 16 (L) >60 ml/min/1.73m2    GFR est non-AA 13 (L) >60 ml/min/1.73m2    Calcium 9.1 8.5 - 10.1 MG/DL    Bilirubin, total 0.5 0.2 - 1.0 MG/DL    ALT (SGPT) 19 12 - 78 U/L    AST (SGOT) 26 15 - 37 U/L    Alk. phosphatase 103 45 - 117 U/L    Protein, total 8.1 6.4 - 8.2 g/dL    Albumin 2.5 (L) 3.5 - 5.0 g/dL    Globulin 5.6 (H) 2.0 - 4.0 g/dL    A-G Ratio 0.4 (L) 1.1 - 2.2     CBC WITH AUTOMATED DIFF    Collection Time: 08/13/20 12:32 AM   Result Value Ref Range    WBC 14.9 (H) 4.1 - 11.1 K/uL    RBC 2.65 (L) 4.10 - 5.70 M/uL    HGB 7.9 (L) 12.1 - 17.0 g/dL    HCT 26.3 (L) 36.6 - 50.3 %    MCV 99.2 (H) 80.0 - 99.0 FL    MCH 29.8 26.0 - 34.0 PG    MCHC 30.0 30.0 - 36.5 g/dL    RDW 14.1 11.5 - 14.5 %    PLATELET 857 (H) 063 - 400 K/uL    MPV 9.3 8.9 - 12.9 FL    NRBC 0.0 0  WBC    ABSOLUTE NRBC 0.00 0.00 - 0.01 K/uL    NEUTROPHILS 84 (H) 32 - 75 %    LYMPHOCYTES 8 (L) 12 - 49 %    MONOCYTES 6 5 - 13 %    EOSINOPHILS 1 0 - 7 %    BASOPHILS 0 0 - 1 %    IMMATURE GRANULOCYTES 1 (H) 0.0 - 0.5 %    ABS. NEUTROPHILS 12.7 (H) 1.8 - 8.0 K/UL    ABS. LYMPHOCYTES 1.2 0.8 - 3.5 K/UL    ABS. MONOCYTES 0.9 0.0 - 1.0 K/UL    ABS. EOSINOPHILS 0.1 0.0 - 0.4 K/UL    ABS. BASOPHILS 0.0 0.0 - 0.1 K/UL    ABS. IMM.  GRANS. 0.1 (H) 0.00 - 0.04 K/UL    DF AUTOMATED     CULTURE, BLOOD    Collection Time: 08/13/20 12:32 AM    Specimen: Blood   Result Value Ref Range    Special Requests: NO SPECIAL REQUESTS      Culture result: NO GROWTH AFTER 1 HOUR     HEMOGLOBIN A1C WITH EAG    Collection Time: 08/13/20 12:32 AM   Result Value Ref Range    Hemoglobin A1c 5.4 4.0 - 5.6 %    Est. average glucose 108 mg/dL   GLUCOSE, POC    Collection Time: 08/13/20  6:37 AM   Result Value Ref Range    Glucose (POC) 211 (H) 65 - 100 mg/dL    Performed by Paris Zamudio    LOWER EXT ART PVR MULT LEVEL SEG PRESSURES    Collection Time: 08/13/20 11:45 AM   Result Value Ref Range    Right arm  mmHg    Right posterior tibial 127 mmHg    Right anterior tibial 73 mmHg    Right JOVANNY 1.02     Right low thigh pressure 131 mmHg    Right calf pressure 127 mmHg    Left low thigh pressure 135 mmHg   GLUCOSE, POC    Collection Time: 08/13/20 12:30 PM   Result Value Ref Range    Glucose (POC) 221 (H) 65 - 100 mg/dL    Performed by Roman Alcazar            Total time spent with patient:  xxx   min. Care Plan discussed with:  Patient     Family      RN      Consulting Physician [id number],         I have reviewed the flowsheets. Chart and Pertinent Notes have been reviewed. No change in PMH ,family and social history from Consult note.       Vishnu Patel MD

## 2020-08-14 ENCOUNTER — ANESTHESIA EVENT (OUTPATIENT)
Dept: SURGERY | Age: 65
DRG: 853 | End: 2020-08-14
Payer: MEDICARE

## 2020-08-14 LAB
ANION GAP SERPL CALC-SCNC: 8 MMOL/L (ref 5–15)
BUN SERPL-MCNC: 35 MG/DL (ref 6–20)
BUN/CREAT SERPL: 5 (ref 12–20)
CALCIUM SERPL-MCNC: 8.8 MG/DL (ref 8.5–10.1)
CHLORIDE SERPL-SCNC: 102 MMOL/L (ref 97–108)
CO2 SERPL-SCNC: 25 MMOL/L (ref 21–32)
CREAT SERPL-MCNC: 6.43 MG/DL (ref 0.7–1.3)
GLUCOSE BLD STRIP.AUTO-MCNC: 154 MG/DL (ref 65–100)
GLUCOSE BLD STRIP.AUTO-MCNC: 194 MG/DL (ref 65–100)
GLUCOSE BLD STRIP.AUTO-MCNC: 198 MG/DL (ref 65–100)
GLUCOSE BLD STRIP.AUTO-MCNC: 245 MG/DL (ref 65–100)
GLUCOSE SERPL-MCNC: 187 MG/DL (ref 65–100)
LEFT LOW THIGH PRESSURE: 135 MMHG
POTASSIUM SERPL-SCNC: 4.8 MMOL/L (ref 3.5–5.1)
RIGHT ABI: 1.02
RIGHT ANTERIOR TIBIAL: 73 MMHG
RIGHT ARM BP: 124 MMHG
RIGHT CALF PRESSURE: 127 MMHG
RIGHT LOW THIGH PRESSURE: 131 MMHG
RIGHT POSTERIOR TIBIAL: 127 MMHG
SERVICE CMNT-IMP: ABNORMAL
SODIUM SERPL-SCNC: 135 MMOL/L (ref 136–145)

## 2020-08-14 PROCEDURE — 74011000250 HC RX REV CODE- 250: Performed by: FAMILY MEDICINE

## 2020-08-14 PROCEDURE — 74011000258 HC RX REV CODE- 258: Performed by: FAMILY MEDICINE

## 2020-08-14 PROCEDURE — 74011636637 HC RX REV CODE- 636/637: Performed by: FAMILY MEDICINE

## 2020-08-14 PROCEDURE — 82962 GLUCOSE BLOOD TEST: CPT

## 2020-08-14 PROCEDURE — 74011250637 HC RX REV CODE- 250/637: Performed by: FAMILY MEDICINE

## 2020-08-14 PROCEDURE — 36415 COLL VENOUS BLD VENIPUNCTURE: CPT

## 2020-08-14 PROCEDURE — 90935 HEMODIALYSIS ONE EVALUATION: CPT

## 2020-08-14 PROCEDURE — 80048 BASIC METABOLIC PNL TOTAL CA: CPT

## 2020-08-14 PROCEDURE — 74011250636 HC RX REV CODE- 250/636: Performed by: INTERNAL MEDICINE

## 2020-08-14 PROCEDURE — 74011250636 HC RX REV CODE- 250/636: Performed by: FAMILY MEDICINE

## 2020-08-14 PROCEDURE — 65660000000 HC RM CCU STEPDOWN

## 2020-08-14 PROCEDURE — 5A1D70Z PERFORMANCE OF URINARY FILTRATION, INTERMITTENT, LESS THAN 6 HOURS PER DAY: ICD-10-PCS | Performed by: INTERNAL MEDICINE

## 2020-08-14 RX ORDER — TIMOLOL MALEATE 2.5 MG/ML
1 SOLUTION/ DROPS OPHTHALMIC 2 TIMES DAILY
Status: DISCONTINUED | OUTPATIENT
Start: 2020-08-14 | End: 2020-08-22 | Stop reason: HOSPADM

## 2020-08-14 RX ADMIN — LATANOPROST 1 DROP: 50 SOLUTION OPHTHALMIC at 21:29

## 2020-08-14 RX ADMIN — ACETAMINOPHEN 650 MG: 325 TABLET ORAL at 16:40

## 2020-08-14 RX ADMIN — HEPARIN SODIUM 5000 UNITS: 5000 INJECTION INTRAVENOUS; SUBCUTANEOUS at 13:35

## 2020-08-14 RX ADMIN — CLOPIDOGREL BISULFATE 75 MG: 75 TABLET ORAL at 08:28

## 2020-08-14 RX ADMIN — INSULIN GLARGINE 16 UNITS: 100 INJECTION, SOLUTION SUBCUTANEOUS at 08:28

## 2020-08-14 RX ADMIN — INSULIN LISPRO 1 UNITS: 100 INJECTION, SOLUTION INTRAVENOUS; SUBCUTANEOUS at 21:28

## 2020-08-14 RX ADMIN — TIMOLOL MALEATE 1 DROP: 2.5 SOLUTION/ DROPS OPHTHALMIC at 18:53

## 2020-08-14 RX ADMIN — CEFEPIME HYDROCHLORIDE 1 G: 1 INJECTION, POWDER, FOR SOLUTION INTRAMUSCULAR; INTRAVENOUS at 18:33

## 2020-08-14 RX ADMIN — VANCOMYCIN HYDROCHLORIDE 750 MG: 750 INJECTION, POWDER, LYOPHILIZED, FOR SOLUTION INTRAVENOUS at 13:36

## 2020-08-14 RX ADMIN — ACETAMINOPHEN 650 MG: 325 TABLET ORAL at 08:34

## 2020-08-14 RX ADMIN — SODIUM CHLORIDE 75 ML/HR: 900 INJECTION, SOLUTION INTRAVENOUS at 06:57

## 2020-08-14 RX ADMIN — TAMSULOSIN HYDROCHLORIDE 0.4 MG: 0.4 CAPSULE ORAL at 21:29

## 2020-08-14 RX ADMIN — Medication 10 ML: at 06:07

## 2020-08-14 RX ADMIN — HEPARIN SODIUM 5000 UNITS: 5000 INJECTION INTRAVENOUS; SUBCUTANEOUS at 21:29

## 2020-08-14 RX ADMIN — HEPARIN SODIUM 5000 UNITS: 5000 INJECTION INTRAVENOUS; SUBCUTANEOUS at 06:07

## 2020-08-14 RX ADMIN — ATORVASTATIN CALCIUM 20 MG: 20 TABLET, FILM COATED ORAL at 08:28

## 2020-08-14 RX ADMIN — EPOETIN ALFA-EPBX 20000 UNITS: 10000 INJECTION, SOLUTION INTRAVENOUS; SUBCUTANEOUS at 21:27

## 2020-08-14 NOTE — PROGRESS NOTES
Problem: Diabetes Self-Management  Goal: *Disease process and treatment process  Description: Define diabetes and identify own type of diabetes; list 3 options for treating diabetes. Outcome: Progressing Towards Goal  Goal: *Incorporating nutritional management into lifestyle  Description: Describe effect of type, amount and timing of food on blood glucose; list 3 methods for planning meals. Outcome: Progressing Towards Goal     Problem: Falls - Risk of  Goal: *Absence of Falls  Description: Document Sheila Amezcua Fall Risk and appropriate interventions in the flowsheet.   Outcome: Progressing Towards Goal  Note: Fall Risk Interventions:  Mobility Interventions: Communicate number of staff needed for ambulation/transfer    Mentation Interventions: Adequate sleep, hydration, pain control, door open when unattended, call bell in reach    Medication Interventions: Patient to call before getting OOB    Elimination Interventions: Call light in reach

## 2020-08-14 NOTE — PROGRESS NOTES
Comprehensive Nutrition Assessment    Type and Reason for Visit: Initial, Positive nutrition screen    Nutrition Recommendations/Plan:   1. Continue Carbohydrate diet - , 255, 241 mg/dL. 2. Added Nepro BID for increased protein needs from HD and prepping for right BKA. Nutrition Assessment:       Pt Sepsis ([facility] [address] [A41.9]. Pt  has a past medical history of CAD (coronary artery disease), Chronic kidney disease, Diabetes ([facility] [address] ESRD (end stage renal disease) ([facility] [address] Glaucoma, Heart failure ([facility] [address] [facility] Hyperlipidemia, Hypertension, Ill-defined condition, [facility] and Stroke [facility]) (August 2011). Noted pt to have Right BKA on Monday 8/17. Pt has HD MWF. States he's lost weight since he started living at the nursing home. Pt was closer to 200 lbs in March this year and usual weight prior to that appears to be ~200 lbs. Currently 186 lbs. Down 7% over 5 months. Not significant for time frame. Pt did have Left BKA in July, which could account for some of the weight loss. Pt state he was eating poorly at the nursing home due to not liking the food. \"It has no seasoning. 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 Pt likely used to more salt than nursing home food. Claims he always has loose stools. A1c 5.9, but Hbg 7.9 and on HD, A1c likely inaccurate. Elevated BG. Wt Readings from Last 30 Encounters:   08/14/20 84.6 kg (186 lb 8.2 oz)   07/24/20 83.8 kg (184 lb 12.8 oz)   07/17/20 88 kg (194 lb)   03/05/20 92.6 kg (204 lb 2.3 oz)   03/20/17 92.6 kg (204 lb 1.6 oz)   03/14/17 93.4 kg (205 lb 14.4 oz)   06/18/16 98.1 kg (216 lb 6 oz)   06/08/16 91.7 kg (202 lb 1 oz)   06/16/15 84.4 kg (186 lb)   05/13/14 76.2 kg (168 lb)        Malnutrition Assessment:  Malnutrition Status: At risk for malnutrition (specify)    Context:     Reported low intakes, mild weight loss.      Estimated Daily Nutrient Needs:  Energy (kcal):  2055 (BMRx1.3)-5.9% amputation  Protein (g): 120 (1.4 g/kg)       Fluid (ml/day):  1500 or per renal MD    Nutrition Related Findings:  Last BM 8/13, no edema      Wounds:    Diabetic ulcer       Current Nutrition Therapies:   DIET DIABETIC CONSISTENT CARB Regular  DIET NPO Except Meds  DIET NUTRITIONAL SUPPLEMENTS Dinner, Breakfast; Nepro  DIET ONE TIME MESSAGE    Anthropometric Measures:  · Height:  6' 1\" (185.4 cm)  · Current Body Wt:  84.6 kg (186 lb 8.2 oz)   · Admission Body Wt:  187 lb 2.7 oz    · Usual Body Wt:  180 lb     · Ideal Body Wt:   :    184 lbs  · Adjusted Body Weight:  197.5;  Weight Adjustment for: Amputation   · Adjusted BMI:  26.1    · BMI Category:  Normal weight (BMI 22.0-24.9) age over 72       Nutrition Diagnosis:   · Inadequate protein intake related to increased demand for energy/nutrients as evidenced by weight loss 1-2% in 1 week, lab values(HD dependent, BKA)      Nutrition Interventions:   Food and/or Nutrient Delivery: Continue current diet  Nutrition Education and Counseling: Education not indicated  Coordination of Nutrition Care: Continued inpatient monitoring    Goals:  Pt will consume >50% of meals and 1-2 ONS within 4-6 days       Nutrition Monitoring and Evaluation:   Behavioral-Environmental Outcomes: Beliefs and attitudes  Food/Nutrient Intake Outcomes: Food and nutrient intake, Supplement intake  Physical Signs/Symptoms Outcomes: Biochemical data, Weight, Skin    Discharge Planning:    Recommend pursue outpatient nutrition counseling, Continue current diet     Electronically signed by Noe Sabillon RD on 8/14/2020     Contact: [phone]

## 2020-08-14 NOTE — PROCEDURES
[facility] Dialysis Team [facility]  [phone]    Vitals   Pre   Post   Assessment   Pre   Post     Temp  Temp: 98.9 °F (37.2 °C) (08/14/20 0845)  98.6 LOC  A&OX4 A&OX4   HR   Pulse (Heart Rate): 85 (08/14/20 0858) 88 Lungs   CTA  CTA   B/P   BP: 121/56 (08/14/20 0858) 123/60 Cardiac   HRR  HRR   Resp   Resp Rate: 16 (08/14/20 0858) 16 Skin   Wounds to R foot and L BKA  Wounds to R foot and L BKA   Pain level  Pain Intensity 1: 4 (08/14/20 0833) 0 Edema    None noted   None noted   Orders:    Duration:   Start:    08:58 End:    12:05 Total:   3.15   Dialyzer:   Dialyzer/Set Up Inspection: Revaclear (08/14/20 0858)   Manus Docker Bath:   Dialysate K (mEq/L): 2 (08/14/20 0858)   Ca Bath:   Dialysate CA (mEq/L): 2.5 (08/14/20 0858)   Na/Bicarb:   Dialysate NA (mEq/L): 140 (08/14/20 0858)   Target Fluid Removal:   Goal/Amount of Fluid to Remove (mL): 2500 mL (08/14/20 0858)   Access     Type & Location:   Left arm AV Fistula accessed with two 15 gauge needles without difficulty   Labs     Obtained/Reviewed   Critical Results Called   Date when labs were drawn-  Hgb-    HGB   Date Value Ref Range Status   08/13/2020 7.9 (L) 12.1 - 17.0 g/dL Final     K-    Potassium   Date Value Ref Range Status   08/14/2020 4.8 3.5 - 5.1 mmol/L Final     Comment:     SPECIMEN HEMOLYZED, RESULTS MAY BE AFFECTED  INVESTIGATED PER DELTA CHECK PROTOCOL       Ca-   Calcium   Date Value Ref Range Status   08/14/2020 8.8 8.5 - 10.1 MG/DL Final     Bun-   BUN   Date Value Ref Range Status   08/14/2020 35 (H) 6 - 20 MG/DL Final     Creat-   Creatinine   Date Value Ref Range Status   08/14/2020 6.43 (H) 0.70 - 1.30 MG/DL Final     Comment:     INVESTIGATED PER DELTA CHECK PROTOCOL        Medications/ Blood Products Given     Name   Dose   Route and Time                     Blood Volume Processed (BVP):    65.9 Net Fluid   Removed:  2400   Comments   Time Out Done: yes 08:55  Primary Nurse Rpt Pre: Mishicot Mask RN  Primary Nurse Rpt Post:  Pt Education: procedural  Care Plan: Jatin Cardoza planned for Monday  Tx Summary: Came to suite, time out and assessment performed and documented. Left arm cleaned with alcohol wipes per protocol. AV Fistula accessed with two 15 gauge needles without difficulty. Treatment started at 08:58    Treatment ended at 12:05  Due to clotting, Dr. Mehdi Trinidad made aware. All possible blood returned to patient. Needles removed and access held until hemostasis achieved. Admiting Diagnosis: R foot infection  Pt's previous clinic- [facility]  Consent signed -    Ananya Consent -   Hepatitis Status- negative/immune 1/15/20  Machine #- Machine Number: [id number] (08/14/20 9429)  Telemetry status-remote  Pre-dialysis wt. -

## 2020-08-14 NOTE — PROGRESS NOTES
JJ:     RUR 17%     Patient lives at [place] [address] He will need Medicaid transportation at discharge. Referral sent to [facility] via [employer].     Guanaco Santos RN/CRM

## 2020-08-14 NOTE — ANESTHESIA PREPROCEDURE EVALUATION
Relevant Problems   No relevant active problems       Anesthetic History   No history of anesthetic complications            Review of Systems / Medical History  Patient summary reviewed, nursing notes reviewed and pertinent labs reviewed    Pulmonary  Within defined limits                 Neuro/Psych       CVA       Cardiovascular    Hypertension      CHF    Past MI, CAD, PAD and CABG         GI/Hepatic/Renal         Renal disease: ESRD and dialysis       Endo/Other    Diabetes         Other Findings              Physical Exam    Airway  Mallampati: II  TM Distance: > 6 cm  Neck ROM: normal range of motion   Mouth opening: Normal     Cardiovascular  Regular rate and rhythm,  S1 and S2 normal,  no murmur, click, rub, or gallop             Dental  No notable dental hx       Pulmonary  Breath sounds clear to auscultation               Abdominal  GI exam deferred       Other Findings            Anesthetic Plan    ASA: 3  Anesthesia type: general          Induction: Intravenous  Anesthetic plan and risks discussed with: Patient

## 2020-08-14 NOTE — PROGRESS NOTES
14:32  Sister Delfina Kimbrough advises patient takes Trimolol for his glaucoma. Checking the List from [facility], the medication is not listed. Called Pharmacy spoke with pharmacist, confirmed medication is for glacoma and Dr. Jackie Rodríguez could place the order. Called Dr. Jackie Rodríguez and he advised he would place the order. Sister of patient is most appreciative.   Robert Ocampo

## 2020-08-14 NOTE — PROGRESS NOTES
Bedside shift change report given to Jodi RN (oncoming nurse) by Paloma Powell RN (offgoing nurse). Report included the following information SBAR, Intake/Output, MAR, Recent Results and Cardiac Rhythm NSR.

## 2020-08-14 NOTE — PROGRESS NOTES
Vascular:    No new issues. Patient and his sister agree to right BKA. Plan OR Monday late morning. Please arrange dialysis schedule accordingly.

## 2020-08-14 NOTE — PROGRESS NOTES
[facility] Adult  Hospitalist Group                                                                                          Hospitalist Progress Note  Rob Perez MD  Answering service: [phone] OR [phone] from in house phone        Date of Service:  2020  NAME:  Brendan Garcia  :  1955  MRN:  [id number]      Admission Summary:   Brendan Garcia is a 75 y.o. male who presents with right foot ulcer. Patient presented to the ER with a right heel wound. Per patient and the patient's family member that the wound has been there for about 6 to 8 months, he has been getting wound care for the same, but the wound got worse and starting hurting. Today it was noticed that the wound was draining, patient was sent to the ER for further management and evaluation. Patient continues to have pain and discomfort in his right lower extremity. Patient recently had a BKA in July due to peripheral arterial disease. Interval history / Subjective:   Patient awake and eating breakfast before dialysis this am  He was without complaints on exam  Seen by vascular surgery with plans for right BKA on Monday     Assessment & Plan:     Sepsis due to Right foot cellulitis/gangrene with presumptive osteomyelitis :   broad-spectrum IV antibiotics, vascular surgery has been consulted,   Heel was debrided but likely needs amputation to reach a cure  Vascular surgery recommending R BKA- planned for Monday  Cont plavix for PVD     ESRD: Patient to get dialysis today,    Nephrology consulted and following     anemia of chronic disease: Monitor H&H     Hypokalemia: repleted,  Monitor     Diabetes mellitus type 2: Poorly controlled, patient will be on sliding scale insulin, Accu-Cheks diet control and close monitoring.   Will adjust insulin regimen    HLD- cont statin  BPH- cont flomax  Glaucoma- resume home eyedrops    Code status: FULL  DVT prophylaxis: Heparin    Care Plan discussed with: Patient/Family  Anticipated Disposition: SNF/LTC  Anticipated Discharge: Greater than 48 hours     Hospital Problems  Date Reviewed: 3/17/2017          Codes Class Noted POA    Sepsis ([facility] [address] ICD-10-CM: A41.9  ICD-9-CM: 038.9, 995.91  8/12/2020 Unknown                Review of Systems:   A comprehensive review of systems was negative except for that written in the HPI. Vital Signs:    Last 24hrs VS reviewed since prior progress note. Most recent are:  Visit Vitals  BP (!) 108/39   Pulse 80   Temp 98.3 °F (36.8 °C)   Resp 16   Ht 6' 1\" (1.854 m)   Wt 84.6 kg (186 lb 8.2 oz)   SpO2 98%   BMI 24.61 kg/m²         Intake/Output Summary (Last 24 hours) at 8/14/2020 0831  Last data filed at 8/14/2020 0700  Gross per 24 hour   Intake 2846.25 ml   Output    Net 2846.25 ml        Physical Examination:             Constitutional:  No acute distress, sleepy on exam    ENT:  Oral mucosa dry   Resp:  CTA bilaterally. No wheezing/rhonchi/rales. No accessory muscle use   CV:  Regular rhythm, normal rate, no murmurs, gallops, rubs    GI:  Soft, non distended, non tender. normoactive bowel sounds,    Musculoskeletal:  left BKA, staples intact and healing well, R foot dressing- CDI    Neurologic:  no focal weakness     Psych:  flat affect       Data Review:    Review and/or order of clinical lab test  Review and/or order of tests in the radiology section of CPT  Review and/or order of tests in the medicine section of CPT      Labs:     Recent Labs     08/13/20  0032 08/12/20  1554   WBC 14.9* 14.9*   HGB 7.9* 8.7*   HCT 26.3* 28.8*   * 490*     Recent Labs     08/14/20  0325 08/13/20  0032 08/12/20  1554   * 133* 135*   K 4.8 3.3* 3.4*    96* 95*   CO2 25 30 33*   BUN 35* 22* 15   CREA 6.43* 4.54* 3.47*   * 219* 220*   CA 8.8 9.1 9.9     Recent Labs     08/13/20 0032   ALT 19      TBILI 0.5   TP 8.1   ALB 2.5*   GLOB 5.6*     No results for input(s): INR, PTP, APTT, INREXT, INREXT in the last 72 hours.    No results for input(s): FE, TIBC, PSAT, FERR in the last 72 hours. No results found for: FOL, RBCF   No results for input(s): PH, PCO2, PO2 in the last 72 hours. No results for input(s): CPK, CKNDX, TROIQ in the last 72 hours.     No lab exists for component: CPKMB  No results found for: CHOL, CHOLX, CHLST, CHOLV, HDL, HDLP, LDL, LDLC, DLDLP, TGLX, TRIGL, TRIGP, CHHD, CHHDX  Lab Results   Component Value Date/Time    Glucose (POC) 194 (H) 08/14/2020 06:24 AM    Glucose (POC) 255 (H) 08/13/2020 09:45 PM    Glucose (POC) 241 (H) 08/13/2020 04:36 PM    Glucose (POC) 221 (H) 08/13/2020 12:30 PM    Glucose (POC) 211 (H) 08/13/2020 06:37 AM     No results found for: COLOR, APPRN, SPGRU, REFSG, ALFONSO, PROTU, GLUCU, KETU, BILU, UROU, SUNSHINE, LEUKU, GLUKE, EPSU, BACTU, WBCU, RBCU, CASTS, UCRY      Medications Reviewed:     Current Facility-Administered Medications   Medication Dose Route Frequency    cefepime (MAXIPIME) 1 g in 0.9% sodium chloride (MBP/ADV) 50 mL  1 g IntraVENous Q24H    epoetin dyan-epbx (RETACRIT) injection 20,000 Units  20,000 Units SubCUTAneous Q MON, WED & FRI    clopidogreL (PLAVIX) tablet 75 mg  75 mg Oral DAILY    insulin glargine (LANTUS) injection 16 Units  16 Units SubCUTAneous DAILY    isosorbide mononitrate ER (IMDUR) tablet 60 mg  60 mg Oral DAILY    latanoprost (XALATAN) 0.005 % ophthalmic solution 1 Drop  1 Drop Both Eyes QHS    atorvastatin (LIPITOR) tablet 20 mg  20 mg Oral DAILY    tamsulosin (FLOMAX) capsule 0.4 mg  0.4 mg Oral QHS    sodium chloride (NS) flush 5-40 mL  5-40 mL IntraVENous Q8H    sodium chloride (NS) flush 5-40 mL  5-40 mL IntraVENous PRN    0.9% sodium chloride infusion  75 mL/hr IntraVENous CONTINUOUS    acetaminophen (TYLENOL) tablet 650 mg  650 mg Oral Q4H PRN    heparin (porcine) injection 5,000 Units  5,000 Units SubCUTAneous Q8H    glucose chewable tablet 16 g  4 Tab Oral PRN    glucagon (GLUCAGEN) injection 1 mg  1 mg IntraMUSCular PRN    dextrose 10% infusion 0-250 mL  0-250 mL IntraVENous PRN    Vancomycin- Pharmacy to Dose    Other Rx Dosing/Monitoring    insulin lispro (HUMALOG) injection   SubCUTAneous AC&HS     ______________________________________________________________________  EXPECTED LENGTH OF STAY: 9d 19h  ACTUAL LENGTH OF STAY:          2                 Merna Garcia MD

## 2020-08-14 NOTE — PROGRESS NOTES
Problem: Falls - Risk of  Goal: *Absence of Falls  Description: Document Antelmo Hamlin Fall Risk and appropriate interventions in the flowsheet. Outcome: Progressing Towards Goal  Note: Fall Risk Interventions:  Mobility Interventions: Bed/chair exit alarm, Communicate number of staff needed for ambulation/transfer, Patient to call before getting OOB, Utilize walker, cane, or other assistive device, Utilize gait belt for transfers/ambulation    Mentation Interventions: Door open when patient unattended    Medication Interventions: Teach patient to arise slowly, Patient to call before getting OOB    Elimination Interventions: Bed/chair exit alarm, Call light in reach, Elevated toilet seat, Patient to call for help with toileting needs, Stay With Me (per policy), Urinal in reach, Toileting schedule/hourly rounds, Toilet paper/wipes in reach              Problem: Pressure Injury - Risk of  Goal: *Prevention of pressure injury  Description: Document Jose Scale and appropriate interventions in the flowsheet. Outcome: Progressing Towards Goal  Note: Pressure Injury Interventions:       Moisture Interventions: Absorbent underpads    Activity Interventions: Increase time out of bed, Pressure redistribution bed/mattress(bed type)    Mobility Interventions: Assess need for specialty bed, Float heels, Pressure redistribution bed/mattress (bed type), PT/OT evaluation, Turn and reposition approx.  every two hours(pillow and wedges)    Nutrition Interventions: Document food/fluid/supplement intake

## 2020-08-14 NOTE — PROGRESS NOTES
Bedside shift change report given to LILA Mederos (oncoming nurse) by Azeb Rueda RN (offgoing nurse). Report included the following information SBAR, Procedure Summary, Intake/Output, MAR, Recent Results and Cardiac Rhythm NSR.

## 2020-08-14 NOTE — PROGRESS NOTES
[facility] [facility] Adult  Hospitalist Group                                                                                          Hospitalist Progress Note  Lyndon Lujan MD  Answering service: [phone] OR [phone] from in house phone        Date of Service:  2020  NAME:  August Cardenas  :  1955  MRN:  [id number]      Admission Summary:   August Cardenas is a 75 y.o. male who presents with right foot ulcer. Patient presented to the ER with a right heel wound. Per patient and the patient's family member that the wound has been there for about 6 to 8 months, he has been getting wound care for the same, but the wound got worse and starting hurting. Today it was noticed that the wound was draining, patient was sent to the ER for further management and evaluation. Patient continues to have pain and discomfort in his right lower extremity. Patient recently had a BKA in July due to peripheral arterial disease. Interval history / Subjective:   Patient sleepy after dialysis  He was without complaints on exam  Seen by vascular surgery with recs for right BKA     Assessment & Plan:     Sepsis due to Right foot cellulitis/gangrene with presumptive osteomyelitis :   broad-spectrum IV antibiotics, vascular surgery has been consulted,   Heel was debrided but likely needs amputation to reach a cure  Vascular surgery recommending R BKA  Cont plavix for PVD     ESRD: Patient received dialysis today,    Nephrology consulted and following     anemia of chronic disease: Monitor H&H     Hypokalemia: replete and Monitor     Diabetes mellitus type 2: Poorly controlled, patient will be on sliding scale insulin, Accu-Cheks diet control and close monitoring.     HLD- cont statin  BPH- cont flomax  Glaucoma- resume home eyedrops    Code status: FULL  DVT prophylaxis: Heparin    Care Plan discussed with: Patient/Family  Anticipated Disposition: SNF/LTC  Anticipated Discharge: Greater than 48 hours     Hospital Problems Date Reviewed: 3/17/2017          Codes Class Noted POA    Sepsis ([facility] [address] ICD-10-CM: A41.9  ICD-9-CM: 038.9, 995.91  8/12/2020 Unknown                Review of Systems:   A comprehensive review of systems was negative except for that written in the HPI. Vital Signs:    Last 24hrs VS reviewed since prior progress note. Most recent are:  Visit Vitals  /42 (BP 1 Location: Right arm, BP Patient Position: At rest)   Pulse 92   Temp 99.3 °F (37.4 °C)   Resp 20   Ht 6' 1\" (1.854 m)   Wt 84.7 kg (186 lb 11.7 oz)   SpO2 96%   BMI 24.64 kg/m²         Intake/Output Summary (Last 24 hours) at 8/13/2020 2132  Last data filed at 8/13/2020 2030  Gross per 24 hour   Intake 120 ml   Output    Net 120 ml        Physical Examination:             Constitutional:  No acute distress, sleepy on exam    ENT:  Oral mucosa dry   Resp:  CTA bilaterally. No wheezing/rhonchi/rales. No accessory muscle use   CV:  Regular rhythm, normal rate, no murmurs, gallops, rubs    GI:  Soft, non distended, non tender. normoactive bowel sounds,    Musculoskeletal:  left BKA, staples intact and healing well, R foot dressing- CDI    Neurologic:  no focal weakness     Psych:  flat affect       Data Review:    Review and/or order of clinical lab test  Review and/or order of tests in the radiology section of CPT  Review and/or order of tests in the medicine section of CPT      Labs:     Recent Labs     08/13/20  0032 08/12/20  1554   WBC 14.9* 14.9*   HGB 7.9* 8.7*   HCT 26.3* 28.8*   * 490*     Recent Labs     08/13/20  0032 08/12/20  1554   * 135*   K 3.3* 3.4*   CL 96* 95*   CO2 30 33*   BUN 22* 15   CREA 4.54* 3.47*   * 220*   CA 9.1 9.9     Recent Labs     08/13/20  0032   ALT 19      TBILI 0.5   TP 8.1   ALB 2.5*   GLOB 5.6*     No results for input(s): INR, PTP, APTT, INREXT in the last 72 hours. No results for input(s): FE, TIBC, PSAT, FERR in the last 72 hours.    No results found for: FOL, RBCF   No results for input(s): PH, PCO2, PO2 in the last 72 hours. No results for input(s): CPK, CKNDX, TROIQ in the last 72 hours.     No lab exists for component: CPKMB  No results found for: CHOL, CHOLX, CHLST, CHOLV, HDL, HDLP, LDL, LDLC, DLDLP, TGLX, TRIGL, TRIGP, CHHD, CHHDX  Lab Results   Component Value Date/Time    Glucose (POC) 241 (H) 08/13/2020 04:36 PM    Glucose (POC) 221 (H) 08/13/2020 12:30 PM    Glucose (POC) 211 (H) 08/13/2020 06:37 AM    Glucose (POC) 206 (H) 08/12/2020 09:52 PM    Glucose (POC) 197 (H) 07/24/2020 04:24 PM     No results found for: COLOR, APPRN, SPGRU, REFSG, ALFONSO, PROTU, GLUCU, KETU, BILU, UROU, SUNSHINE, LEUKU, GLUKE, EPSU, BACTU, WBCU, RBCU, CASTS, UCRY      Medications Reviewed:     Current Facility-Administered Medications   Medication Dose Route Frequency    cefepime (MAXIPIME) 1 g in 0.9% sodium chloride (MBP/ADV) 50 mL  1 g IntraVENous Q24H    [START ON 8/14/2020] epoetin dyan-epbx (RETACRIT) injection 20,000 Units  20,000 Units SubCUTAneous Q MON, WED & FRI    clopidogreL (PLAVIX) tablet 75 mg  75 mg Oral DAILY    insulin glargine (LANTUS) injection 16 Units  16 Units SubCUTAneous DAILY    isosorbide mononitrate ER (IMDUR) tablet 60 mg  60 mg Oral DAILY    latanoprost (XALATAN) 0.005 % ophthalmic solution 1 Drop  1 Drop Both Eyes QHS    atorvastatin (LIPITOR) tablet 20 mg  20 mg Oral DAILY    tamsulosin (FLOMAX) capsule 0.4 mg  0.4 mg Oral QHS    sodium chloride (NS) flush 5-40 mL  5-40 mL IntraVENous Q8H    sodium chloride (NS) flush 5-40 mL  5-40 mL IntraVENous PRN    0.9% sodium chloride infusion  75 mL/hr IntraVENous CONTINUOUS    acetaminophen (TYLENOL) tablet 650 mg  650 mg Oral Q4H PRN    heparin (porcine) injection 5,000 Units  5,000 Units SubCUTAneous Q8H    glucose chewable tablet 16 g  4 Tab Oral PRN    glucagon (GLUCAGEN) injection 1 mg  1 mg IntraMUSCular PRN    dextrose 10% infusion 0-250 mL  0-250 mL IntraVENous PRN    Vancomycin- Pharmacy to Dose    Other Rx Dosing/Monitoring    insulin lispro (HUMALOG) injection   SubCUTAneous AC&HS     ______________________________________________________________________  EXPECTED LENGTH OF STAY: 4d 14h  ACTUAL LENGTH OF STAY:          1                 Malik Love MD

## 2020-08-14 NOTE — PROGRESS NOTES
2330: SBAR received from Rosa M SHARP. 0400: Patient incontinent of large bowel movement. CHG bath given. PIV pulled out. New PIV started. 0730: Bedside and Verbal shift change report given to Raj Freeman RN  (oncoming nurse) by [address] (offgoing nurse). Report included the following information SBAR, Kardex, Intake/Output, MAR and Recent Results.

## 2020-08-15 LAB
ANION GAP SERPL CALC-SCNC: 7 MMOL/L (ref 5–15)
BUN SERPL-MCNC: 26 MG/DL (ref 6–20)
BUN/CREAT SERPL: 5 (ref 12–20)
CALCIUM SERPL-MCNC: 8.6 MG/DL (ref 8.5–10.1)
CHLORIDE SERPL-SCNC: 100 MMOL/L (ref 97–108)
CO2 SERPL-SCNC: 28 MMOL/L (ref 21–32)
CREAT SERPL-MCNC: 5.18 MG/DL (ref 0.7–1.3)
GLUCOSE BLD STRIP.AUTO-MCNC: 155 MG/DL (ref 65–100)
GLUCOSE BLD STRIP.AUTO-MCNC: 239 MG/DL (ref 65–100)
GLUCOSE BLD STRIP.AUTO-MCNC: 267 MG/DL (ref 65–100)
GLUCOSE BLD STRIP.AUTO-MCNC: 283 MG/DL (ref 65–100)
GLUCOSE SERPL-MCNC: 171 MG/DL (ref 65–100)
POTASSIUM SERPL-SCNC: 3.2 MMOL/L (ref 3.5–5.1)
SERVICE CMNT-IMP: ABNORMAL
SODIUM SERPL-SCNC: 135 MMOL/L (ref 136–145)

## 2020-08-15 PROCEDURE — 36415 COLL VENOUS BLD VENIPUNCTURE: CPT

## 2020-08-15 PROCEDURE — 74011000258 HC RX REV CODE- 258: Performed by: FAMILY MEDICINE

## 2020-08-15 PROCEDURE — 74011250636 HC RX REV CODE- 250/636: Performed by: FAMILY MEDICINE

## 2020-08-15 PROCEDURE — 74011250637 HC RX REV CODE- 250/637: Performed by: FAMILY MEDICINE

## 2020-08-15 PROCEDURE — 82962 GLUCOSE BLOOD TEST: CPT

## 2020-08-15 PROCEDURE — 65660000000 HC RM CCU STEPDOWN

## 2020-08-15 PROCEDURE — 94760 N-INVAS EAR/PLS OXIMETRY 1: CPT

## 2020-08-15 PROCEDURE — 80048 BASIC METABOLIC PNL TOTAL CA: CPT

## 2020-08-15 PROCEDURE — 74011636637 HC RX REV CODE- 636/637: Performed by: FAMILY MEDICINE

## 2020-08-15 RX ORDER — INSULIN LISPRO 100 [IU]/ML
5 INJECTION, SOLUTION INTRAVENOUS; SUBCUTANEOUS
Status: DISCONTINUED | OUTPATIENT
Start: 2020-08-16 | End: 2020-08-16

## 2020-08-15 RX ORDER — INSULIN GLARGINE 100 [IU]/ML
20 INJECTION, SOLUTION SUBCUTANEOUS DAILY
Status: DISCONTINUED | OUTPATIENT
Start: 2020-08-16 | End: 2020-08-22 | Stop reason: HOSPADM

## 2020-08-15 RX ORDER — POTASSIUM CHLORIDE 750 MG/1
40 TABLET, FILM COATED, EXTENDED RELEASE ORAL
Status: COMPLETED | OUTPATIENT
Start: 2020-08-15 | End: 2020-08-15

## 2020-08-15 RX ADMIN — HEPARIN SODIUM 5000 UNITS: 5000 INJECTION INTRAVENOUS; SUBCUTANEOUS at 12:26

## 2020-08-15 RX ADMIN — INSULIN LISPRO 2 UNITS: 100 INJECTION, SOLUTION INTRAVENOUS; SUBCUTANEOUS at 21:20

## 2020-08-15 RX ADMIN — ATORVASTATIN CALCIUM 20 MG: 20 TABLET, FILM COATED ORAL at 08:25

## 2020-08-15 RX ADMIN — LATANOPROST 1 DROP: 50 SOLUTION OPHTHALMIC at 21:21

## 2020-08-15 RX ADMIN — TIMOLOL MALEATE 1 DROP: 2.5 SOLUTION/ DROPS OPHTHALMIC at 08:26

## 2020-08-15 RX ADMIN — INSULIN LISPRO 3 UNITS: 100 INJECTION, SOLUTION INTRAVENOUS; SUBCUTANEOUS at 12:25

## 2020-08-15 RX ADMIN — Medication 10 ML: at 14:27

## 2020-08-15 RX ADMIN — SODIUM CHLORIDE 75 ML/HR: 900 INJECTION, SOLUTION INTRAVENOUS at 16:55

## 2020-08-15 RX ADMIN — POTASSIUM CHLORIDE 40 MEQ: 750 TABLET, FILM COATED, EXTENDED RELEASE ORAL at 16:51

## 2020-08-15 RX ADMIN — SODIUM CHLORIDE 75 ML/HR: 900 INJECTION, SOLUTION INTRAVENOUS at 06:18

## 2020-08-15 RX ADMIN — TAMSULOSIN HYDROCHLORIDE 0.4 MG: 0.4 CAPSULE ORAL at 21:19

## 2020-08-15 RX ADMIN — HEPARIN SODIUM 5000 UNITS: 5000 INJECTION INTRAVENOUS; SUBCUTANEOUS at 21:19

## 2020-08-15 RX ADMIN — Medication 10 ML: at 06:15

## 2020-08-15 RX ADMIN — HEPARIN SODIUM 5000 UNITS: 5000 INJECTION INTRAVENOUS; SUBCUTANEOUS at 05:00

## 2020-08-15 RX ADMIN — CLOPIDOGREL BISULFATE 75 MG: 75 TABLET ORAL at 08:25

## 2020-08-15 RX ADMIN — INSULIN LISPRO 2 UNITS: 100 INJECTION, SOLUTION INTRAVENOUS; SUBCUTANEOUS at 17:06

## 2020-08-15 RX ADMIN — CEFEPIME HYDROCHLORIDE 1 G: 1 INJECTION, POWDER, FOR SOLUTION INTRAMUSCULAR; INTRAVENOUS at 17:01

## 2020-08-15 RX ADMIN — TIMOLOL MALEATE 1 DROP: 2.5 SOLUTION/ DROPS OPHTHALMIC at 17:01

## 2020-08-15 RX ADMIN — ISOSORBIDE MONONITRATE 60 MG: 30 TABLET ORAL at 08:25

## 2020-08-15 RX ADMIN — INSULIN GLARGINE 16 UNITS: 100 INJECTION, SOLUTION SUBCUTANEOUS at 08:25

## 2020-08-15 RX ADMIN — Medication 10 ML: at 21:20

## 2020-08-15 NOTE — PROGRESS NOTES
TRANSFER - IN REPORT:    Verbal report received from Linda(name) on Gia Schaefer  being received from [facility](unit) for routine progression of care      Report consisted of patients Situation, Background, Assessment and   Recommendations(SBAR). Information from the following report(s) SBAR, MAR and Med Rec Status was reviewed with the receiving nurse. Opportunity for questions and clarification was provided. Assessment completed upon patients arrival to unit and care assumed.

## 2020-08-15 NOTE — PROGRESS NOTES
Problem: Diabetes Self-Management  Goal: *Disease process and treatment process  Description: Define diabetes and identify own type of diabetes; list 3 options for treating diabetes. Outcome: Progressing Towards Goal     Problem: Falls - Risk of  Goal: *Absence of Falls  Description: Document Allegra Pace Fall Risk and appropriate interventions in the flowsheet.   Outcome: Progressing Towards Goal  Note: Fall Risk Interventions:  Mobility Interventions: Communicate number of staff needed for ambulation/transfer, Assess mobility with egress test    Mentation Interventions: Adequate sleep, hydration, pain control, Evaluate medications/consider consulting pharmacy    Medication Interventions: Patient to call before getting OOB, Evaluate medications/consider consulting pharmacy    Elimination Interventions: Call light in reach, Toileting schedule/hourly rounds

## 2020-08-15 NOTE — PROGRESS NOTES
Bedside and Verbal shift change report given to 1100 Tyler Mullen (oncoming nurse) by Jayneijeoma Evans (offgoing nurse). Report included the following information SBAR, Kardex, ED Summary, OR Summary, Procedure Summary, Intake/Output, MAR, Recent Results and Cardiac Rhythm NSR.

## 2020-08-15 NOTE — PROGRESS NOTES
Bedside and Verbal shift change report given to Anay Brooks (oncoming nurse) by 2201 Chandra Patel (offgoing nurse). Report included the following information SBAR, MAR and Med Rec Status.

## 2020-08-16 LAB
ABO + RH BLD: NORMAL
ANION GAP SERPL CALC-SCNC: 9 MMOL/L (ref 5–15)
ANTIGENS PRESENT RBC DONR: NORMAL
ANTIGENS PRESENT RBC DONR: NORMAL
BLD PROD TYP BPU: NORMAL
BLD PROD TYP BPU: NORMAL
BLOOD BANK CMNT PATIENT-IMP: NORMAL
BLOOD GROUP ANTIBODIES SERPL: NORMAL
BLOOD GROUP ANTIBODIES SERPL: NORMAL
BPU ID: NORMAL
BPU ID: NORMAL
BUN SERPL-MCNC: 41 MG/DL (ref 6–20)
BUN/CREAT SERPL: 6 (ref 12–20)
CALCIUM SERPL-MCNC: 8.5 MG/DL (ref 8.5–10.1)
CHLORIDE SERPL-SCNC: 101 MMOL/L (ref 97–108)
CO2 SERPL-SCNC: 26 MMOL/L (ref 21–32)
CREAT SERPL-MCNC: 7.06 MG/DL (ref 0.7–1.3)
CROSSMATCH RESULT,%XM: NORMAL
CROSSMATCH RESULT,%XM: NORMAL
GLUCOSE BLD STRIP.AUTO-MCNC: 202 MG/DL (ref 65–100)
GLUCOSE BLD STRIP.AUTO-MCNC: 213 MG/DL (ref 65–100)
GLUCOSE BLD STRIP.AUTO-MCNC: 252 MG/DL (ref 65–100)
GLUCOSE BLD STRIP.AUTO-MCNC: 300 MG/DL (ref 65–100)
GLUCOSE SERPL-MCNC: 239 MG/DL (ref 65–100)
POTASSIUM SERPL-SCNC: 3.8 MMOL/L (ref 3.5–5.1)
SERVICE CMNT-IMP: ABNORMAL
SODIUM SERPL-SCNC: 136 MMOL/L (ref 136–145)
SPECIMEN EXP DATE BLD: NORMAL
STATUS OF UNIT,%ST: NORMAL
STATUS OF UNIT,%ST: NORMAL
UNIT DIVISION, %UDIV: 0
UNIT DIVISION, %UDIV: 0

## 2020-08-16 PROCEDURE — 65660000000 HC RM CCU STEPDOWN

## 2020-08-16 PROCEDURE — 94760 N-INVAS EAR/PLS OXIMETRY 1: CPT

## 2020-08-16 PROCEDURE — 74011250636 HC RX REV CODE- 250/636: Performed by: FAMILY MEDICINE

## 2020-08-16 PROCEDURE — 80048 BASIC METABOLIC PNL TOTAL CA: CPT

## 2020-08-16 PROCEDURE — 82962 GLUCOSE BLOOD TEST: CPT

## 2020-08-16 PROCEDURE — 74011000250 HC RX REV CODE- 250: Performed by: FAMILY MEDICINE

## 2020-08-16 PROCEDURE — 74011636637 HC RX REV CODE- 636/637: Performed by: FAMILY MEDICINE

## 2020-08-16 PROCEDURE — 74011250637 HC RX REV CODE- 250/637: Performed by: FAMILY MEDICINE

## 2020-08-16 PROCEDURE — 74011000258 HC RX REV CODE- 258: Performed by: FAMILY MEDICINE

## 2020-08-16 PROCEDURE — 36415 COLL VENOUS BLD VENIPUNCTURE: CPT

## 2020-08-16 RX ORDER — INSULIN LISPRO 100 [IU]/ML
10 INJECTION, SOLUTION INTRAVENOUS; SUBCUTANEOUS
Status: DISCONTINUED | OUTPATIENT
Start: 2020-08-17 | End: 2020-08-22 | Stop reason: HOSPADM

## 2020-08-16 RX ADMIN — Medication 10 ML: at 17:07

## 2020-08-16 RX ADMIN — INSULIN LISPRO 5 UNITS: 100 INJECTION, SOLUTION INTRAVENOUS; SUBCUTANEOUS at 17:05

## 2020-08-16 RX ADMIN — TAMSULOSIN HYDROCHLORIDE 0.4 MG: 0.4 CAPSULE ORAL at 21:50

## 2020-08-16 RX ADMIN — INSULIN LISPRO 3 UNITS: 100 INJECTION, SOLUTION INTRAVENOUS; SUBCUTANEOUS at 17:06

## 2020-08-16 RX ADMIN — ACETAMINOPHEN 650 MG: 325 TABLET ORAL at 06:07

## 2020-08-16 RX ADMIN — HEPARIN SODIUM 5000 UNITS: 5000 INJECTION INTRAVENOUS; SUBCUTANEOUS at 05:06

## 2020-08-16 RX ADMIN — INSULIN LISPRO 4 UNITS: 100 INJECTION, SOLUTION INTRAVENOUS; SUBCUTANEOUS at 12:30

## 2020-08-16 RX ADMIN — INSULIN LISPRO 3 UNITS: 100 INJECTION, SOLUTION INTRAVENOUS; SUBCUTANEOUS at 08:27

## 2020-08-16 RX ADMIN — CEFEPIME HYDROCHLORIDE 1 G: 1 INJECTION, POWDER, FOR SOLUTION INTRAMUSCULAR; INTRAVENOUS at 17:05

## 2020-08-16 RX ADMIN — INSULIN LISPRO 1 UNITS: 100 INJECTION, SOLUTION INTRAVENOUS; SUBCUTANEOUS at 21:49

## 2020-08-16 RX ADMIN — INSULIN GLARGINE 20 UNITS: 100 INJECTION, SOLUTION SUBCUTANEOUS at 08:27

## 2020-08-16 RX ADMIN — INSULIN LISPRO 5 UNITS: 100 INJECTION, SOLUTION INTRAVENOUS; SUBCUTANEOUS at 08:26

## 2020-08-16 RX ADMIN — ISOSORBIDE MONONITRATE 60 MG: 30 TABLET ORAL at 08:26

## 2020-08-16 RX ADMIN — CLOPIDOGREL BISULFATE 75 MG: 75 TABLET ORAL at 08:26

## 2020-08-16 RX ADMIN — ATORVASTATIN CALCIUM 20 MG: 20 TABLET, FILM COATED ORAL at 08:26

## 2020-08-16 RX ADMIN — LATANOPROST 1 DROP: 50 SOLUTION OPHTHALMIC at 21:50

## 2020-08-16 RX ADMIN — HEPARIN SODIUM 5000 UNITS: 5000 INJECTION INTRAVENOUS; SUBCUTANEOUS at 12:22

## 2020-08-16 RX ADMIN — TIMOLOL MALEATE 1 DROP: 2.5 SOLUTION/ DROPS OPHTHALMIC at 09:00

## 2020-08-16 RX ADMIN — Medication 10 ML: at 21:51

## 2020-08-16 RX ADMIN — ACETAMINOPHEN 650 MG: 325 TABLET ORAL at 18:41

## 2020-08-16 RX ADMIN — INSULIN LISPRO 5 UNITS: 100 INJECTION, SOLUTION INTRAVENOUS; SUBCUTANEOUS at 12:29

## 2020-08-16 RX ADMIN — TIMOLOL MALEATE 1 DROP: 2.5 SOLUTION/ DROPS OPHTHALMIC at 19:07

## 2020-08-16 NOTE — PROGRESS NOTES
Bedside and Verbal shift change report given to Cheyenne (oncoming nurse) by Amena Espinal (offgoing nurse). Report included the following information SBAR, MAR, Recent Results and Med Rec Status.

## 2020-08-16 NOTE — PROGRESS NOTES
Problem: Diabetes Self-Management  Goal: *Disease process and treatment process  Description: Define diabetes and identify own type of diabetes; list 3 options for treating diabetes. Outcome: Progressing Towards Goal  Goal: *Incorporating nutritional management into lifestyle  Description: Describe effect of type, amount and timing of food on blood glucose; list 3 methods for planning meals. Outcome: Progressing Towards Goal  Goal: *Incorporating physical activity into lifestyle  Description: State effect of exercise on blood glucose levels. Outcome: Progressing Towards Goal  Goal: *Developing strategies to promote health/change behavior  Description: Define the ABC's of diabetes; identify appropriate screenings, schedule and personal plan for screenings. Outcome: Progressing Towards Goal  Goal: *Using medications safely  Description: State effect of diabetes medications on diabetes; name diabetes medication taking, action and side effects. Outcome: Progressing Towards Goal  Goal: *Monitoring blood glucose, interpreting and using results  Description: Identify recommended blood glucose targets  and personal targets. Outcome: Progressing Towards Goal  Goal: *Prevention, detection, treatment of acute complications  Description: List symptoms of hyper- and hypoglycemia; describe how to treat low blood sugar and actions for lowering  high blood glucose level. Outcome: Progressing Towards Goal  Goal: *Prevention, detection and treatment of chronic complications  Description: Define the natural course of diabetes and describe the relationship of blood glucose levels to long term complications of diabetes.   Outcome: Progressing Towards Goal  Goal: *Developing strategies to address psychosocial issues  Description: Describe feelings about living with diabetes; identify support needed and support network  Outcome: Progressing Towards Goal  Goal: *Insulin pump training  Outcome: Progressing Towards Goal  Goal: *Sick day guidelines  Outcome: Progressing Towards Goal  Goal: *Patient Specific Goal (EDIT GOAL, INSERT TEXT)  Outcome: Progressing Towards Goal     Problem: Patient Education: Go to Patient Education Activity  Goal: Patient/Family Education  Outcome: Progressing Towards Goal     Problem: Falls - Risk of  Goal: *Absence of Falls  Description: Document Rebbecca Persons Fall Risk and appropriate interventions in the flowsheet. Outcome: Progressing Towards Goal  Note: Fall Risk Interventions:  Mobility Interventions: Communicate number of staff needed for ambulation/transfer, Assess mobility with egress test    Mentation Interventions: Adequate sleep, hydration, pain control    Medication Interventions: Evaluate medications/consider consulting pharmacy    Elimination Interventions: Call light in reach              Problem: Patient Education: Go to Patient Education Activity  Goal: Patient/Family Education  Outcome: Progressing Towards Goal     Problem: Discharge Planning  Goal: *Discharge to safe environment  Outcome: Progressing Towards Goal     Problem: Pressure Injury - Risk of  Goal: *Prevention of pressure injury  Description: Document Jose Scale and appropriate interventions in the flowsheet.   Outcome: Progressing Towards Goal  Note: Pressure Injury Interventions:  Sensory Interventions: Assess changes in LOC    Moisture Interventions: Minimize layers, Absorbent underpads    Activity Interventions: Pressure redistribution bed/mattress(bed type)    Mobility Interventions: Pressure redistribution bed/mattress (bed type), Assess need for specialty bed    Nutrition Interventions: Document food/fluid/supplement intake    Friction and Shear Interventions: Minimize layers                Problem: Patient Education: Go to Patient Education Activity  Goal: Patient/Family Education  Outcome: Progressing Towards Goal

## 2020-08-16 NOTE — PROGRESS NOTES
Bedside shift change report given to Mo RN (oncoming nurse) by RENZO Estrada (offgoing nurse). Report included the following information SBAR, Kardex, Procedure Summary, MAR and Recent Results.

## 2020-08-16 NOTE — PROGRESS NOTES
[id number]  Hospitalist Group                                                                                          Hospitalist Progress Note  Xiao Francis MD  Answering service: [phone] OR [phone] from in house phone        Date of Service:  8/15/2020  NAME:  Divina Weiss  :  1955  MRN:  [id number]      Admission Summary:   Divina Weiss is a 75 y.o. male who presents with right foot ulcer. Patient presented to the ER with a right heel wound. Per patient and the patient's family member that the wound has been there for about 6 to 8 months, he has been getting wound care for the same, but the wound got worse and starting hurting. Today it was noticed that the wound was draining, patient was sent to the ER for further management and evaluation. Patient continues to have pain and discomfort in his right lower extremity. Patient recently had a BKA in July due to peripheral arterial disease. Interval history / Subjective:   Patient was without complaints on exam  Seen by vascular surgery with plans for right BKA on Monday     Assessment & Plan:     Sepsis due to Right foot cellulitis/gangrene with presumptive osteomyelitis :   broad-spectrum IV antibiotics, vascular surgery has been consulted,   Heel was debrided but likely needs amputation to reach a cure  Vascular surgery recommending R BKA- planned for Monday  Cont plavix for PVD     ESRD: Patient to get dialysis MWF,    Nephrology consulted and following     anemia of chronic disease: Monitor H&H     Hypokalemia: replete,  Monitor     Diabetes mellitus type 2: Poorly controlled on admission w hyperglycemia  cont sliding scale insulin, Accu-Cheks diet control and close monitoring.   adjusting insulin regimen    HLD- cont statin  BPH- cont flomax  Glaucoma- cont home eyedrops    Code status: FULL  DVT prophylaxis: Heparin    Care Plan discussed with: Patient/Family  Anticipated Disposition: SNF/LTC  Anticipated Discharge: Greater than 48 hours     Hospital Problems  Date Reviewed: 3/17/2017          Codes Class Noted POA    Sepsis ([facility] [address] ICD-10-CM: A41.9  ICD-9-CM: 038.9, 995.91  8/12/2020 Unknown                Review of Systems:   A comprehensive review of systems was negative except for that written in the HPI. Vital Signs:    Last 24hrs VS reviewed since prior progress note. Most recent are:  Visit Vitals  /46 (BP 1 Location: Right arm, BP Patient Position: At rest)   Pulse 85   Temp 98.9 °F (37.2 °C)   Resp 18   Ht 6' 1\" (1.854 m)   Wt 86.3 kg (190 lb 4.1 oz)   SpO2 98%   BMI 25.10 kg/m²         Intake/Output Summary (Last 24 hours) at 8/15/2020 2221  Last data filed at 8/15/2020 2002  Gross per 24 hour   Intake 2881.25 ml   Output    Net 2881.25 ml        Physical Examination:             Constitutional:  No acute distress, sleepy on exam    ENT:  Oral mucosa dry   Resp:  CTA bilaterally. No wheezing/rhonchi/rales. No accessory muscle use   CV:  Regular rhythm, normal rate, no murmurs, gallops, rubs    GI:  Soft, non distended, non tender. normoactive bowel sounds,    Musculoskeletal:  left BKA, staples intact and healing well, R foot dressing- CDI    Neurologic:  no focal weakness     Psych:  flat affect       Data Review:    Review and/or order of clinical lab test  Review and/or order of tests in the radiology section of CPT  Review and/or order of tests in the medicine section of CPT      Labs:     Recent Labs     08/13/20  0032   WBC 14.9*   HGB 7.9*   HCT 26.3*   *     Recent Labs     08/15/20  0319 08/14/20  0325 08/13/20  0032   * 135* 133*   K 3.2* 4.8 3.3*    102 96*   CO2 28 25 30   BUN 26* 35* 22*   CREA 5.18* 6.43* 4.54*   * 187* 219*   CA 8.6 8.8 9.1     Recent Labs     08/13/20  0032   ALT 19      TBILI 0.5   TP 8.1   ALB 2.5*   GLOB 5.6*     No results for input(s): INR, PTP, APTT, INREXT, INREXT in the last 72 hours.    No results for input(s): FE, TIBC, PSAT, FERR in the last 72 hours. No results found for: FOL, RBCF   No results for input(s): PH, PCO2, PO2 in the last 72 hours. No results for input(s): CPK, CKNDX, TROIQ in the last 72 hours.     No lab exists for component: CPKMB  No results found for: CHOL, CHOLX, CHLST, CHOLV, HDL, HDLP, LDL, LDLC, DLDLP, TGLX, TRIGL, TRIGP, CHHD, CHHDX  Lab Results   Component Value Date/Time    Glucose (POC) 283 (H) 08/15/2020 09:13 PM    Glucose (POC) 239 (H) 08/15/2020 04:32 PM    Glucose (POC) 267 (H) 08/15/2020 11:47 AM    Glucose (POC) 155 (H) 08/15/2020 07:54 AM    Glucose (POC) 245 (H) 08/14/2020 09:16 PM     No results found for: COLOR, APPRN, SPGRU, REFSG, ALFONSO, PROTU, GLUCU, KETU, BILU, UROU, SUNSHINE, LEUKU, GLUKE, EPSU, BACTU, WBCU, RBCU, CASTS, UCRY      Medications Reviewed:     Current Facility-Administered Medications   Medication Dose Route Frequency    timolol (TIMOPTIC) 0.25% ophthalmic solution  1 Drop Both Eyes BID    cefepime (MAXIPIME) 1 g in 0.9% sodium chloride (MBP/ADV) 50 mL  1 g IntraVENous Q24H    epoetin dyan-epbx (RETACRIT) injection 20,000 Units  20,000 Units SubCUTAneous Q MON, WED & FRI    clopidogreL (PLAVIX) tablet 75 mg  75 mg Oral DAILY    insulin glargine (LANTUS) injection 16 Units  16 Units SubCUTAneous DAILY    isosorbide mononitrate ER (IMDUR) tablet 60 mg  60 mg Oral DAILY    latanoprost (XALATAN) 0.005 % ophthalmic solution 1 Drop  1 Drop Both Eyes QHS    atorvastatin (LIPITOR) tablet 20 mg  20 mg Oral DAILY    tamsulosin (FLOMAX) capsule 0.4 mg  0.4 mg Oral QHS    sodium chloride (NS) flush 5-40 mL  5-40 mL IntraVENous Q8H    sodium chloride (NS) flush 5-40 mL  5-40 mL IntraVENous PRN    0.9% sodium chloride infusion  75 mL/hr IntraVENous CONTINUOUS    acetaminophen (TYLENOL) tablet 650 mg  650 mg Oral Q4H PRN    heparin (porcine) injection 5,000 Units  5,000 Units SubCUTAneous Q8H    glucose chewable tablet 16 g  4 Tab Oral PRN    glucagon (GLUCAGEN) injection 1 mg  1 mg IntraMUSCular PRN  dextrose 10% infusion 0-250 mL  0-250 mL IntraVENous PRN    Vancomycin- Pharmacy to Dose    Other Rx Dosing/Monitoring    insulin lispro (HUMALOG) injection   SubCUTAneous AC&HS     ______________________________________________________________________  EXPECTED LENGTH OF STAY: 9d 19h  ACTUAL LENGTH OF STAY:          3                 Xiao Francis MD

## 2020-08-17 ENCOUNTER — ANESTHESIA (OUTPATIENT)
Dept: SURGERY | Age: 65
DRG: 853 | End: 2020-08-17
Payer: MEDICARE

## 2020-08-17 LAB
ANION GAP SERPL CALC-SCNC: 9 MMOL/L (ref 5–15)
BUN SERPL-MCNC: 53 MG/DL (ref 6–20)
BUN/CREAT SERPL: 6 (ref 12–20)
CALCIUM SERPL-MCNC: 8.7 MG/DL (ref 8.5–10.1)
CHLORIDE SERPL-SCNC: 102 MMOL/L (ref 97–108)
CO2 SERPL-SCNC: 25 MMOL/L (ref 21–32)
CREAT SERPL-MCNC: 9.2 MG/DL (ref 0.7–1.3)
ERYTHROCYTE [DISTWIDTH] IN BLOOD BY AUTOMATED COUNT: 15.9 % (ref 11.5–14.5)
GLUCOSE BLD STRIP.AUTO-MCNC: 145 MG/DL (ref 65–100)
GLUCOSE BLD STRIP.AUTO-MCNC: 164 MG/DL (ref 65–100)
GLUCOSE BLD STRIP.AUTO-MCNC: 165 MG/DL (ref 65–100)
GLUCOSE BLD STRIP.AUTO-MCNC: 174 MG/DL (ref 65–100)
GLUCOSE BLD STRIP.AUTO-MCNC: 181 MG/DL (ref 65–100)
GLUCOSE BLD STRIP.AUTO-MCNC: 186 MG/DL (ref 65–100)
GLUCOSE BLD STRIP.AUTO-MCNC: 90 MG/DL (ref 65–100)
GLUCOSE SERPL-MCNC: 155 MG/DL (ref 65–100)
HCT VFR BLD AUTO: 23 % (ref 36.6–50.3)
HGB BLD-MCNC: 6.9 G/DL (ref 12.1–17)
MAGNESIUM SERPL-MCNC: 1.9 MG/DL (ref 1.6–2.4)
MCH RBC QN AUTO: 30.1 PG (ref 26–34)
MCHC RBC AUTO-ENTMCNC: 30 G/DL (ref 30–36.5)
MCV RBC AUTO: 100.4 FL (ref 80–99)
NRBC # BLD: 0.06 K/UL (ref 0–0.01)
NRBC BLD-RTO: 0.5 PER 100 WBC
PHOSPHATE SERPL-MCNC: 2 MG/DL (ref 2.6–4.7)
PLATELET # BLD AUTO: 380 K/UL (ref 150–400)
PMV BLD AUTO: 9.5 FL (ref 8.9–12.9)
POTASSIUM SERPL-SCNC: 3.7 MMOL/L (ref 3.5–5.1)
RBC # BLD AUTO: 2.29 M/UL (ref 4.1–5.7)
SERVICE CMNT-IMP: ABNORMAL
SERVICE CMNT-IMP: NORMAL
SODIUM SERPL-SCNC: 136 MMOL/L (ref 136–145)
VANCOMYCIN SERPL-MCNC: 19 UG/ML
WBC # BLD AUTO: 12 K/UL (ref 4.1–11.1)

## 2020-08-17 PROCEDURE — 74011250636 HC RX REV CODE- 250/636

## 2020-08-17 PROCEDURE — 88307 TISSUE EXAM BY PATHOLOGIST: CPT

## 2020-08-17 PROCEDURE — 0Y6H0Z2 DETACHMENT AT RIGHT LOWER LEG, MID, OPEN APPROACH: ICD-10-PCS

## 2020-08-17 PROCEDURE — 88311 DECALCIFY TISSUE: CPT

## 2020-08-17 PROCEDURE — 86920 COMPATIBILITY TEST SPIN: CPT

## 2020-08-17 PROCEDURE — 77030008462 HC STPLR SKN PROX J&J -A

## 2020-08-17 PROCEDURE — 86921 COMPATIBILITY TEST INCUBATE: CPT

## 2020-08-17 PROCEDURE — 74011250636 HC RX REV CODE- 250/636: Performed by: ANESTHESIOLOGY

## 2020-08-17 PROCEDURE — 77030031139 HC SUT VCRL2 J&J -A

## 2020-08-17 PROCEDURE — 77030013079 HC BLNKT BAIR HGGR 3M -A: Performed by: ANESTHESIOLOGY

## 2020-08-17 PROCEDURE — 74011000250 HC RX REV CODE- 250: Performed by: NURSE ANESTHETIST, CERTIFIED REGISTERED

## 2020-08-17 PROCEDURE — 74011636637 HC RX REV CODE- 636/637

## 2020-08-17 PROCEDURE — 76060000033 HC ANESTHESIA 1 TO 1.5 HR

## 2020-08-17 PROCEDURE — 74011250636 HC RX REV CODE- 250/636: Performed by: FAMILY MEDICINE

## 2020-08-17 PROCEDURE — 85027 COMPLETE CBC AUTOMATED: CPT

## 2020-08-17 PROCEDURE — 80202 ASSAY OF VANCOMYCIN: CPT

## 2020-08-17 PROCEDURE — 84100 ASSAY OF PHOSPHORUS: CPT

## 2020-08-17 PROCEDURE — P9016 RBC LEUKOCYTES REDUCED: HCPCS

## 2020-08-17 PROCEDURE — 30233N1 TRANSFUSION OF NONAUTOLOGOUS RED BLOOD CELLS INTO PERIPHERAL VEIN, PERCUTANEOUS APPROACH: ICD-10-PCS | Performed by: INTERNAL MEDICINE

## 2020-08-17 PROCEDURE — L1830 KO IMMOB CANVAS LONG PRE OTS: HCPCS

## 2020-08-17 PROCEDURE — 74011250637 HC RX REV CODE- 250/637: Performed by: FAMILY MEDICINE

## 2020-08-17 PROCEDURE — 65210000002 HC RM PRIVATE GYN

## 2020-08-17 PROCEDURE — 86870 RBC ANTIBODY IDENTIFICATION: CPT

## 2020-08-17 PROCEDURE — 86900 BLOOD TYPING SEROLOGIC ABO: CPT

## 2020-08-17 PROCEDURE — 74011000258 HC RX REV CODE- 258

## 2020-08-17 PROCEDURE — 74011250636 HC RX REV CODE- 250/636: Performed by: NURSE ANESTHETIST, CERTIFIED REGISTERED

## 2020-08-17 PROCEDURE — 86902 BLOOD TYPE ANTIGEN DONOR EA: CPT

## 2020-08-17 PROCEDURE — 94760 N-INVAS EAR/PLS OXIMETRY 1: CPT

## 2020-08-17 PROCEDURE — 36415 COLL VENOUS BLD VENIPUNCTURE: CPT

## 2020-08-17 PROCEDURE — 80048 BASIC METABOLIC PNL TOTAL CA: CPT

## 2020-08-17 PROCEDURE — 77030011640 HC PAD GRND REM COVD -A

## 2020-08-17 PROCEDURE — 86922 COMPATIBILITY TEST ANTIGLOB: CPT

## 2020-08-17 PROCEDURE — 74011250637 HC RX REV CODE- 250/637: Performed by: NURSE PRACTITIONER

## 2020-08-17 PROCEDURE — 77030008684 HC TU ET CUF COVD -B: Performed by: ANESTHESIOLOGY

## 2020-08-17 PROCEDURE — 76210000016 HC OR PH I REC 1 TO 1.5 HR

## 2020-08-17 PROCEDURE — 77030025655 HC BLD SAW GIGLI BIOM -B

## 2020-08-17 PROCEDURE — 83735 ASSAY OF MAGNESIUM: CPT

## 2020-08-17 PROCEDURE — 74011250637 HC RX REV CODE- 250/637

## 2020-08-17 PROCEDURE — 76010000138 HC OR TIME 0.5 TO 1 HR

## 2020-08-17 PROCEDURE — 82962 GLUCOSE BLOOD TEST: CPT

## 2020-08-17 PROCEDURE — 77030026438 HC STYL ET INTUB CARD -A: Performed by: ANESTHESIOLOGY

## 2020-08-17 RX ORDER — LIDOCAINE HYDROCHLORIDE 10 MG/ML
0.1 INJECTION, SOLUTION EPIDURAL; INFILTRATION; INTRACAUDAL; PERINEURAL AS NEEDED
Status: DISCONTINUED | OUTPATIENT
Start: 2020-08-17 | End: 2020-08-17 | Stop reason: HOSPADM

## 2020-08-17 RX ORDER — LIDOCAINE HYDROCHLORIDE 20 MG/ML
INJECTION, SOLUTION EPIDURAL; INFILTRATION; INTRACAUDAL; PERINEURAL AS NEEDED
Status: DISCONTINUED | OUTPATIENT
Start: 2020-08-17 | End: 2020-08-17 | Stop reason: HOSPADM

## 2020-08-17 RX ORDER — HYDROMORPHONE HYDROCHLORIDE 1 MG/ML
0.2 INJECTION, SOLUTION INTRAMUSCULAR; INTRAVENOUS; SUBCUTANEOUS
Status: ACTIVE | OUTPATIENT
Start: 2020-08-17 | End: 2020-08-17

## 2020-08-17 RX ORDER — MIDAZOLAM HYDROCHLORIDE 1 MG/ML
1 INJECTION, SOLUTION INTRAMUSCULAR; INTRAVENOUS AS NEEDED
Status: DISCONTINUED | OUTPATIENT
Start: 2020-08-17 | End: 2020-08-17 | Stop reason: HOSPADM

## 2020-08-17 RX ORDER — KETAMINE HYDROCHLORIDE 10 MG/ML
INJECTION, SOLUTION INTRAMUSCULAR; INTRAVENOUS AS NEEDED
Status: DISCONTINUED | OUTPATIENT
Start: 2020-08-17 | End: 2020-08-17 | Stop reason: HOSPADM

## 2020-08-17 RX ORDER — EPHEDRINE SULFATE/0.9% NACL/PF 50 MG/5 ML
5 SYRINGE (ML) INTRAVENOUS AS NEEDED
Status: DISCONTINUED | OUTPATIENT
Start: 2020-08-17 | End: 2020-08-17 | Stop reason: HOSPADM

## 2020-08-17 RX ORDER — FENTANYL CITRATE 50 UG/ML
50 INJECTION, SOLUTION INTRAMUSCULAR; INTRAVENOUS AS NEEDED
Status: DISCONTINUED | OUTPATIENT
Start: 2020-08-17 | End: 2020-08-17 | Stop reason: HOSPADM

## 2020-08-17 RX ORDER — PHENYLEPHRINE HCL IN 0.9% NACL 0.4MG/10ML
SYRINGE (ML) INTRAVENOUS AS NEEDED
Status: DISCONTINUED | OUTPATIENT
Start: 2020-08-17 | End: 2020-08-17 | Stop reason: HOSPADM

## 2020-08-17 RX ORDER — SUCCINYLCHOLINE CHLORIDE 20 MG/ML
INJECTION INTRAMUSCULAR; INTRAVENOUS AS NEEDED
Status: DISCONTINUED | OUTPATIENT
Start: 2020-08-17 | End: 2020-08-17 | Stop reason: HOSPADM

## 2020-08-17 RX ORDER — PROPOFOL 10 MG/ML
INJECTION, EMULSION INTRAVENOUS AS NEEDED
Status: DISCONTINUED | OUTPATIENT
Start: 2020-08-17 | End: 2020-08-17 | Stop reason: HOSPADM

## 2020-08-17 RX ORDER — ROCURONIUM BROMIDE 10 MG/ML
INJECTION, SOLUTION INTRAVENOUS AS NEEDED
Status: DISCONTINUED | OUTPATIENT
Start: 2020-08-17 | End: 2020-08-17 | Stop reason: HOSPADM

## 2020-08-17 RX ORDER — SODIUM CHLORIDE 9 MG/ML
25 INJECTION, SOLUTION INTRAVENOUS CONTINUOUS
Status: DISCONTINUED | OUTPATIENT
Start: 2020-08-17 | End: 2020-08-17 | Stop reason: HOSPADM

## 2020-08-17 RX ORDER — ONDANSETRON 2 MG/ML
4 INJECTION INTRAMUSCULAR; INTRAVENOUS AS NEEDED
Status: DISCONTINUED | OUTPATIENT
Start: 2020-08-17 | End: 2020-08-17 | Stop reason: HOSPADM

## 2020-08-17 RX ORDER — HYDROCODONE BITARTRATE AND ACETAMINOPHEN 5; 325 MG/1; MG/1
1 TABLET ORAL
Status: DISCONTINUED | OUTPATIENT
Start: 2020-08-17 | End: 2020-08-22 | Stop reason: HOSPADM

## 2020-08-17 RX ORDER — SODIUM CHLORIDE 9 MG/ML
250 INJECTION, SOLUTION INTRAVENOUS AS NEEDED
Status: DISCONTINUED | OUTPATIENT
Start: 2020-08-17 | End: 2020-08-22 | Stop reason: HOSPADM

## 2020-08-17 RX ORDER — OXYCODONE HYDROCHLORIDE 5 MG/1
5 TABLET ORAL AS NEEDED
Status: DISCONTINUED | OUTPATIENT
Start: 2020-08-17 | End: 2020-08-17 | Stop reason: HOSPADM

## 2020-08-17 RX ORDER — DIPHENHYDRAMINE HYDROCHLORIDE 50 MG/ML
12.5 INJECTION, SOLUTION INTRAMUSCULAR; INTRAVENOUS AS NEEDED
Status: DISCONTINUED | OUTPATIENT
Start: 2020-08-17 | End: 2020-08-17 | Stop reason: HOSPADM

## 2020-08-17 RX ORDER — DIPHENHYDRAMINE HYDROCHLORIDE 50 MG/ML
INJECTION, SOLUTION INTRAMUSCULAR; INTRAVENOUS
Status: DISPENSED
Start: 2020-08-17 | End: 2020-08-17

## 2020-08-17 RX ORDER — MORPHINE SULFATE 10 MG/ML
2 INJECTION, SOLUTION INTRAMUSCULAR; INTRAVENOUS
Status: DISCONTINUED | OUTPATIENT
Start: 2020-08-17 | End: 2020-08-17 | Stop reason: HOSPADM

## 2020-08-17 RX ORDER — MIDAZOLAM HYDROCHLORIDE 1 MG/ML
0.5 INJECTION, SOLUTION INTRAMUSCULAR; INTRAVENOUS
Status: DISCONTINUED | OUTPATIENT
Start: 2020-08-17 | End: 2020-08-17 | Stop reason: HOSPADM

## 2020-08-17 RX ORDER — FENTANYL CITRATE 50 UG/ML
25 INJECTION, SOLUTION INTRAMUSCULAR; INTRAVENOUS
Status: DISCONTINUED | OUTPATIENT
Start: 2020-08-17 | End: 2020-08-17 | Stop reason: HOSPADM

## 2020-08-17 RX ORDER — ONDANSETRON 2 MG/ML
INJECTION INTRAMUSCULAR; INTRAVENOUS AS NEEDED
Status: DISCONTINUED | OUTPATIENT
Start: 2020-08-17 | End: 2020-08-17 | Stop reason: HOSPADM

## 2020-08-17 RX ORDER — SODIUM CHLORIDE, SODIUM LACTATE, POTASSIUM CHLORIDE, CALCIUM CHLORIDE 600; 310; 30; 20 MG/100ML; MG/100ML; MG/100ML; MG/100ML
1000 INJECTION, SOLUTION INTRAVENOUS CONTINUOUS
Status: DISCONTINUED | OUTPATIENT
Start: 2020-08-17 | End: 2020-08-17 | Stop reason: HOSPADM

## 2020-08-17 RX ORDER — SODIUM CHLORIDE, SODIUM LACTATE, POTASSIUM CHLORIDE, CALCIUM CHLORIDE 600; 310; 30; 20 MG/100ML; MG/100ML; MG/100ML; MG/100ML
100 INJECTION, SOLUTION INTRAVENOUS CONTINUOUS
Status: DISCONTINUED | OUTPATIENT
Start: 2020-08-17 | End: 2020-08-17 | Stop reason: HOSPADM

## 2020-08-17 RX ORDER — MIDAZOLAM HYDROCHLORIDE 1 MG/ML
INJECTION, SOLUTION INTRAMUSCULAR; INTRAVENOUS AS NEEDED
Status: DISCONTINUED | OUTPATIENT
Start: 2020-08-17 | End: 2020-08-17 | Stop reason: HOSPADM

## 2020-08-17 RX ORDER — FENTANYL CITRATE 50 UG/ML
INJECTION, SOLUTION INTRAMUSCULAR; INTRAVENOUS AS NEEDED
Status: DISCONTINUED | OUTPATIENT
Start: 2020-08-17 | End: 2020-08-17 | Stop reason: HOSPADM

## 2020-08-17 RX ORDER — ACETAMINOPHEN 325 MG/1
650 TABLET ORAL ONCE
Status: DISCONTINUED | OUTPATIENT
Start: 2020-08-17 | End: 2020-08-17 | Stop reason: HOSPADM

## 2020-08-17 RX ORDER — DEXMEDETOMIDINE HYDROCHLORIDE 100 UG/ML
INJECTION, SOLUTION INTRAVENOUS AS NEEDED
Status: DISCONTINUED | OUTPATIENT
Start: 2020-08-17 | End: 2020-08-17 | Stop reason: HOSPADM

## 2020-08-17 RX ORDER — ROPIVACAINE HYDROCHLORIDE 5 MG/ML
150 INJECTION, SOLUTION EPIDURAL; INFILTRATION; PERINEURAL AS NEEDED
Status: DISCONTINUED | OUTPATIENT
Start: 2020-08-17 | End: 2020-08-17 | Stop reason: HOSPADM

## 2020-08-17 RX ADMIN — HEPARIN SODIUM 5000 UNITS: 5000 INJECTION INTRAVENOUS; SUBCUTANEOUS at 13:51

## 2020-08-17 RX ADMIN — FENTANYL CITRATE 25 MCG: 50 INJECTION, SOLUTION INTRAMUSCULAR; INTRAVENOUS at 10:52

## 2020-08-17 RX ADMIN — DIPHENHYDRAMINE HYDROCHLORIDE 12.5 MG: 50 INJECTION, SOLUTION INTRAMUSCULAR; INTRAVENOUS at 11:45

## 2020-08-17 RX ADMIN — ACETAMINOPHEN 650 MG: 325 TABLET ORAL at 04:26

## 2020-08-17 RX ADMIN — TAMSULOSIN HYDROCHLORIDE 0.4 MG: 0.4 CAPSULE ORAL at 22:38

## 2020-08-17 RX ADMIN — ROCURONIUM BROMIDE 10 MG: 10 SOLUTION INTRAVENOUS at 10:24

## 2020-08-17 RX ADMIN — PROPOFOL 120 MG: 10 INJECTION, EMULSION INTRAVENOUS at 10:24

## 2020-08-17 RX ADMIN — ONDANSETRON HYDROCHLORIDE 4 MG: 2 INJECTION, SOLUTION INTRAMUSCULAR; INTRAVENOUS at 10:30

## 2020-08-17 RX ADMIN — FENTANYL CITRATE 25 MCG: 50 INJECTION, SOLUTION INTRAMUSCULAR; INTRAVENOUS at 10:49

## 2020-08-17 RX ADMIN — CEFEPIME HYDROCHLORIDE 1 G: 1 INJECTION, POWDER, FOR SOLUTION INTRAMUSCULAR; INTRAVENOUS at 22:39

## 2020-08-17 RX ADMIN — MIDAZOLAM 1 MG: 1 INJECTION INTRAMUSCULAR; INTRAVENOUS at 10:22

## 2020-08-17 RX ADMIN — SODIUM CHLORIDE: 900 INJECTION, SOLUTION INTRAVENOUS at 10:00

## 2020-08-17 RX ADMIN — HYDROCODONE BITARTRATE AND ACETAMINOPHEN 1 TABLET: 5; 325 TABLET ORAL at 22:38

## 2020-08-17 RX ADMIN — SUCCINYLCHOLINE CHLORIDE 120 MG: 20 INJECTION, SOLUTION INTRAMUSCULAR; INTRAVENOUS at 10:25

## 2020-08-17 RX ADMIN — FENTANYL CITRATE 50 MCG: 50 INJECTION, SOLUTION INTRAMUSCULAR; INTRAVENOUS at 10:24

## 2020-08-17 RX ADMIN — TIMOLOL MALEATE 1 DROP: 2.5 SOLUTION/ DROPS OPHTHALMIC at 09:19

## 2020-08-17 RX ADMIN — DEXMEDETOMIDINE HYDROCHLORIDE 12 MCG: 100 INJECTION, SOLUTION, CONCENTRATE INTRAVENOUS at 10:59

## 2020-08-17 RX ADMIN — INSULIN GLARGINE 20 UNITS: 100 INJECTION, SOLUTION SUBCUTANEOUS at 13:51

## 2020-08-17 RX ADMIN — MIDAZOLAM 1 MG: 1 INJECTION INTRAMUSCULAR; INTRAVENOUS at 10:11

## 2020-08-17 RX ADMIN — EPOETIN ALFA-EPBX 20000 UNITS: 10000 INJECTION, SOLUTION INTRAVENOUS; SUBCUTANEOUS at 22:39

## 2020-08-17 RX ADMIN — FENTANYL CITRATE 25 MCG: 50 INJECTION INTRAMUSCULAR; INTRAVENOUS at 11:48

## 2020-08-17 RX ADMIN — KETAMINE HYDROCHLORIDE 20 MG: 10 INJECTION, SOLUTION INTRAMUSCULAR; INTRAVENOUS at 10:35

## 2020-08-17 RX ADMIN — Medication 80 MCG: at 10:24

## 2020-08-17 RX ADMIN — TIMOLOL MALEATE 1 DROP: 2.5 SOLUTION/ DROPS OPHTHALMIC at 22:40

## 2020-08-17 RX ADMIN — ISOSORBIDE MONONITRATE 60 MG: 30 TABLET ORAL at 09:16

## 2020-08-17 RX ADMIN — PHENYLEPHRINE HYDROCHLORIDE 30 MCG/MIN: 10 INJECTION INTRAVENOUS at 10:29

## 2020-08-17 RX ADMIN — FENTANYL CITRATE 25 MCG: 50 INJECTION INTRAMUSCULAR; INTRAVENOUS at 11:53

## 2020-08-17 RX ADMIN — INSULIN LISPRO 10 UNITS: 100 INJECTION, SOLUTION INTRAVENOUS; SUBCUTANEOUS at 13:51

## 2020-08-17 RX ADMIN — ATORVASTATIN CALCIUM 20 MG: 20 TABLET, FILM COATED ORAL at 09:16

## 2020-08-17 RX ADMIN — LIDOCAINE HYDROCHLORIDE 80 MG: 20 INJECTION, SOLUTION EPIDURAL; INFILTRATION; INTRACAUDAL; PERINEURAL at 10:24

## 2020-08-17 RX ADMIN — HEPARIN SODIUM 5000 UNITS: 5000 INJECTION INTRAVENOUS; SUBCUTANEOUS at 04:12

## 2020-08-17 NOTE — PROGRESS NOTES
Pharmacist Note - Vancomycin Dosing  Therapy day 6  Indication: Right foot cellulitis/gangrene with presumptive osteomyelitis   Current regimen: 750 mg with HD    A Random Level resulted at 19 mcg/mL which was obtained pre-HD. Goal trough: 20-25 pre HD to yield 15-20 mcg/mL post HD. Plan: Continue current regimen for now as patient has only received 2 doses thus far. Pharmacy will continue to monitor this patient daily for changes in clinical status and renal function.

## 2020-08-17 NOTE — BRIEF OP NOTE
Brief Postoperative Note    Patient: Mireya Montalvo  YOB: 1955  MRN: [id number]    Date of Procedure: 8/17/2020     Pre-Op Diagnosis: ASO WITH ULCERATION    Post-Op Diagnosis: Same as preoperative diagnosis.       Procedure(s):  RIGHT BELOW KNEE AMPUTATION    Surgeon(s):  Alexsandra Walker MD    Surgical Assistant: None    Anesthesia: General     Estimated Blood Loss (mL): less than 780     Complications: None    Specimens:   ID Type Source Tests Collected by Time Destination   1 : right below the knee amputation Fresh Leg, Right  Alexsandra Walker MD 8/17/2020 1011 Pathology        Implants: * No implants in log *    Drains: * No LDAs found *    Findings: none    Electronically Signed by Majo Shoemaker MD on 8/17/2020 at 11:09 AM

## 2020-08-17 NOTE — PROGRESS NOTES
Bedside and Verbal shift change report given to Brisa De La Vega (oncoming nurse) by Chito Patel (offgoing nurse). Report included the following information SBAR, Kardex, MAR, Recent Results and Cardiac Rhythm NSR.

## 2020-08-17 NOTE — ANESTHESIA POSTPROCEDURE EVALUATION
Post-Anesthesia Evaluation and Assessment    Patient: Juan David Randle MRN: [id number]  SSN: [id number]    YOB: 1955  Age: 75 y.o. Sex: male      I have evaluated the patient and they are stable and ready for discharge from the PACU. Cardiovascular Function/Vital Signs  Visit Vitals  /54   Pulse 73   Temp 36.4 °C (97.6 °F)   Resp (!) 34   Ht 6' 1\" (1.854 m)   Wt 88.1 kg (194 lb 3.6 oz)   SpO2 100%   BMI 25.62 kg/m²       Patient is status post General anesthesia for Procedure(s):  RIGHT BELOW KNEE AMPUTATION. Nausea/Vomiting: None    Postoperative hydration reviewed and adequate. Pain:  Pain Scale 1: Numeric (0 - 10) (08/17/20 1120)  Pain Intensity 1: 0(denies pain) (08/17/20 1120)   Managed    Neurological Status:   Neuro (WDL): Exceptions to WDL (08/17/20 1120)  Neuro  Neurologic State: Drowsy; Eyes open to voice; Eyes open to stimulus (08/17/20 1120)  Orientation Level: Oriented X4 (08/17/20 0915)  Cognition: Appropriate decision making; Appropriate for age attention/concentration; Appropriate safety awareness; Follows commands (08/17/20 0915)  Speech: Clear (08/17/20 0915)  LUE Motor Response: Purposeful (08/17/20 0915)  LLE Motor Response: Purposeful (08/17/20 0915)  RUE Motor Response: Purposeful (08/17/20 0915)  RLE Motor Response: Purposeful (08/17/20 0915)   At baseline    Mental Status, Level of Consciousness: Alert and  oriented to person, place, and time    Pulmonary Status:   O2 Device: Nasal cannula (08/17/20 1133)   Adequate oxygenation and airway patent    Complications related to anesthesia: None    Post-anesthesia assessment completed. No concerns    Signed By: Ollie Mott MD     August 17, 2020              Procedure(s):  RIGHT BELOW KNEE AMPUTATION.     general    <BSHSIANPOST>    INITIAL Post-op Vital signs:   Vitals Value Taken Time   /47 8/17/2020 11:45 AM   Temp 36.4 °C (97.6 °F) 8/17/2020 11:19 AM   Pulse 75 8/17/2020 11:45 AM   Resp 24 8/17/2020 11:45 AM SpO2 100 % 8/17/2020 11:45 AM   Vitals shown include unvalidated device data.

## 2020-08-17 NOTE — PROGRESS NOTES
Problem: Breathing Pattern - Ineffective  Goal: *Absence of hypoxia  Outcome: Resolved/Met  Goal: *Use of effective breathing techniques  Outcome: Progressing Towards Goal     Problem: Patient Education: Go to Patient Education Activity  Goal: Patient/Family Education  Outcome: Progressing Towards Goal

## 2020-08-17 NOTE — ROUTINE PROCESS
Bedside and Verbal shift change report given to [address]  (oncoming nurse) by Sho Robert RN (offgoing nurse). Report included the following information SBAR, Kardex, Procedure Summary, Intake/Output, MAR, Recent Results and Cardiac Rhythm NSR.

## 2020-08-17 NOTE — ROUTINE PROCESS
Patient: Jane Sheriff MRN: [id number]  SSN: [id number] YOB: 1955  Age: 75 y.o. Sex: male Patient is status post Procedure(s): RIGHT BELOW KNEE AMPUTATION. Surgeon(s) and Role: 
   Mauri Briseno MD - Primary Local/Dose/Irrigation:   
 
             
Peripheral IV 08/14/20 Right; Lower Basilic (Active) Site Assessment Clean, dry, & intact 08/17/20 0915 Phlebitis Assessment 0 08/17/20 0915 Infiltration Assessment 0 08/17/20 0915 Dressing Status Clean, dry, & intact 08/17/20 0915 Dressing Type Transparent 08/17/20 0915 Hub Color/Line Status Capped 08/17/20 0915 Action Taken Open ports on tubing capped 08/17/20 0915 Alcohol Cap Used Yes 08/17/20 0915 Airway - Endotracheal Tube 08/17/20 Oral (Active) Dressing/Packing:  Wound Heel Right-Dressing Type: 4 x 4;ABD pad;Fluffs;Non adherent;Elastic bandage (08/17/20 0915) Splint/Cast:  ] Other:

## 2020-08-17 NOTE — PROGRESS NOTES
Problem: Diabetes Self-Management  Goal: *Disease process and treatment process  Description: Define diabetes and identify own type of diabetes; list 3 options for treating diabetes. Outcome: Progressing Towards Goal  Goal: *Incorporating physical activity into lifestyle  Description: State effect of exercise on blood glucose levels. Outcome: Progressing Towards Goal  Goal: *Monitoring blood glucose, interpreting and using results  Description: Identify recommended blood glucose targets  and personal targets. Outcome: Progressing Towards Goal  Goal: *Prevention, detection, treatment of acute complications  Description: List symptoms of hyper- and hypoglycemia; describe how to treat low blood sugar and actions for lowering  high blood glucose level. Outcome: Progressing Towards Goal  Goal: *Prevention, detection and treatment of chronic complications  Description: Define the natural course of diabetes and describe the relationship of blood glucose levels to long term complications of diabetes. Outcome: Progressing Towards Goal     Problem: Pressure Injury - Risk of  Goal: *Prevention of pressure injury  Description: Document Jose Scale and appropriate interventions in the flowsheet. Outcome: Progressing Towards Goal  Note: Pressure Injury Interventions:  Sensory Interventions: Assess changes in LOC    Moisture Interventions: Absorbent underpads    Activity Interventions: PT/OT evaluation, Pressure redistribution bed/mattress(bed type), Increase time out of bed    Mobility Interventions: HOB 30 degrees or less, PT/OT evaluation, Pressure redistribution bed/mattress (bed type), Turn and reposition approx.  every two hours(pillow and wedges)    Nutrition Interventions: Document food/fluid/supplement intake, Discuss nutritional consult with provider    Friction and Shear Interventions: Lift sheet, HOB 30 degrees or less                Problem: General Medical Care Plan  Goal: *Vital signs within specified parameters  Outcome: Progressing Towards Goal  Goal: *Labs within defined limits  Outcome: Progressing Towards Goal  Goal: *Skin integrity maintained  Outcome: Progressing Towards Goal

## 2020-08-17 NOTE — PROGRESS NOTES
[address] [facility]  Hospitalist Group                                                                                          Hospitalist Progress Note  Kym No MD  Answering service: [phone] OR [phone] from in house phone        Date of Service:  2020  NAME:  Jane Sheriff  :  1955  MRN:  [id number]      Admission Summary:   Jane Sheriff is a 75 y.o. male who presents with right foot ulcer. Patient presented to the ER with a right heel wound. Per patient and the patient's family member that the wound has been there for about 6 to 8 months, he has been getting wound care for the same, but the wound got worse and starting hurting. Today it was noticed that the wound was draining, patient was sent to the ER for further management and evaluation. Patient continues to have pain and discomfort in his right lower extremity. Patient recently had a BKA in July due to peripheral arterial disease. Interval history / Subjective:   Patient was without complaints on exam  Seen by vascular surgery with plans for right BKA on Monday? Assessment & Plan:     Sepsis due to Right foot cellulitis/gangrene with presumptive osteomyelitis :   broad-spectrum IV antibiotics, vascular surgery has been consulted,   Heel was debrided but likely needs amputation to reach a cure  Vascular surgery recommending R BKA- planned for Monday  Cont plavix for PVD     ESRD: Patient to get dialysis MWF,    Nephrology consulted and following     anemia of chronic disease: Monitor H&H     Hypokalemia: replete,  Monitor     Diabetes mellitus type 2: Poorly controlled on admission w hyperglycemia  cont sliding scale insulin, Accu-Cheks diet control and close monitoring.   adjusting insulin regimen    HLD- cont statin  BPH- cont flomax  Glaucoma- cont home eyedrops    Code status: FULL  DVT prophylaxis: Heparin    Care Plan discussed with: Patient/Family  Anticipated Disposition: SNF/LTC  Anticipated Discharge: Greater than 48 hours     Hospital Problems  Date Reviewed: 3/17/2017          Codes Class Noted POA    Sepsis ([facility] [address] ICD-10-CM: A41.9  ICD-9-CM: 038.9, 995.91  8/12/2020 Unknown                Review of Systems:   A comprehensive review of systems was negative except for that written in the HPI. Vital Signs:    Last 24hrs VS reviewed since prior progress note. Most recent are:  Visit Vitals  /73 (BP 1 Location: Right arm, BP Patient Position: At rest)   Pulse 86   Temp 99.2 °F (37.3 °C)   Resp 18   Ht 6' 1\" (1.854 m)   Wt 87.5 kg (192 lb 14.4 oz)   SpO2 98%   BMI 25.45 kg/m²       No intake or output data in the 24 hours ending 08/16/20 2309     Physical Examination:             Constitutional:  No acute distress, sleepy on exam    ENT:  Oral mucosa dry   Resp:  CTA bilaterally. No wheezing/rhonchi/rales. No accessory muscle use   CV:  Regular rhythm, normal rate, no murmurs, gallops, rubs    GI:  Soft, non distended, non tender. normoactive bowel sounds,    Musculoskeletal:  left BKA, staples intact and healing well, R foot dressing- CDI    Neurologic:  no focal weakness     Psych:  flat affect       Data Review:    Review and/or order of clinical lab test  Review and/or order of tests in the radiology section of CPT  Review and/or order of tests in the medicine section of CPT      Labs:     No results for input(s): WBC, HGB, HCT, PLT, HGBEXT, HCTEXT, PLTEXT, HGBEXT, HCTEXT, PLTEXT in the last 72 hours. Recent Labs     08/16/20  0120 08/15/20  0319 08/14/20  0325    135* 135*   K 3.8 3.2* 4.8    100 102   CO2 26 28 25   BUN 41* 26* 35*   CREA 7.06* 5.18* 6.43*   * 171* 187*   CA 8.5 8.6 8.8     No results for input(s): ALT, AP, TBIL, TBILI, TP, ALB, GLOB, GGT, AML, LPSE in the last 72 hours. No lab exists for component: SGOT, GPT, AMYP, HLPSE  No results for input(s): INR, PTP, APTT, INREXT, INREXT in the last 72 hours.    No results for input(s): FE, TIBC, PSAT, FERR in the last 72 hours. No results found for: FOL, RBCF   No results for input(s): PH, PCO2, PO2 in the last 72 hours. No results for input(s): CPK, CKNDX, TROIQ in the last 72 hours.     No lab exists for component: CPKMB  No results found for: CHOL, CHOLX, CHLST, CHOLV, HDL, HDLP, LDL, LDLC, DLDLP, TGLX, TRIGL, TRIGP, CHHD, CHHDX  Lab Results   Component Value Date/Time    Glucose (POC) 213 (H) 08/16/2020 09:28 PM    Glucose (POC) 252 (H) 08/16/2020 04:46 PM    Glucose (POC) 300 (H) 08/16/2020 11:20 AM    Glucose (POC) 202 (H) 08/16/2020 07:39 AM    Glucose (POC) 283 (H) 08/15/2020 09:13 PM     No results found for: COLOR, APPRN, SPGRU, REFSG, ALFONSO, PROTU, GLUCU, KETU, BILU, UROU, SUNSHINE, LEUKU, GLUKE, EPSU, BACTU, WBCU, RBCU, CASTS, UCRY      Medications Reviewed:     Current Facility-Administered Medications   Medication Dose Route Frequency    [START ON 8/17/2020] Vancomycin Random 8/17 with AM labs    Other ONCE    insulin glargine (LANTUS) injection 20 Units  20 Units SubCUTAneous DAILY    insulin lispro (HUMALOG) injection 5 Units  5 Units SubCUTAneous TIDAC    timolol (TIMOPTIC) 0.25% ophthalmic solution  1 Drop Both Eyes BID    cefepime (MAXIPIME) 1 g in 0.9% sodium chloride (MBP/ADV) 50 mL  1 g IntraVENous Q24H    epoetin dayn-epbx (RETACRIT) injection 20,000 Units  20,000 Units SubCUTAneous Q MON, WED & FRI    clopidogreL (PLAVIX) tablet 75 mg  75 mg Oral DAILY    isosorbide mononitrate ER (IMDUR) tablet 60 mg  60 mg Oral DAILY    latanoprost (XALATAN) 0.005 % ophthalmic solution 1 Drop  1 Drop Both Eyes QHS    atorvastatin (LIPITOR) tablet 20 mg  20 mg Oral DAILY    tamsulosin (FLOMAX) capsule 0.4 mg  0.4 mg Oral QHS    sodium chloride (NS) flush 5-40 mL  5-40 mL IntraVENous Q8H    sodium chloride (NS) flush 5-40 mL  5-40 mL IntraVENous PRN    acetaminophen (TYLENOL) tablet 650 mg  650 mg Oral Q4H PRN    heparin (porcine) injection 5,000 Units  5,000 Units SubCUTAneous Q8H    glucose chewable tablet 16 g  4 Tab Oral PRN    glucagon (GLUCAGEN) injection 1 mg  1 mg IntraMUSCular PRN    dextrose 10% infusion 0-250 mL  0-250 mL IntraVENous PRN    Vancomycin- Pharmacy to Dose    Other Rx Dosing/Monitoring    insulin lispro (HUMALOG) injection   SubCUTAneous AC&HS     ______________________________________________________________________  EXPECTED LENGTH OF STAY: 9d 19h  ACTUAL LENGTH OF STAY:          4                 Angelica Swanson MD

## 2020-08-17 NOTE — OP NOTES
[address]  OPERATIVE REPORT    Name:  Montserrat Kimble  MR#:  [id number]  :  1955  ACCOUNT #:  [de-identified]  DATE OF SERVICE:  2020      PREOPERATIVE DIAGNOSIS:  Nonhealing right heel wound. POSTOPERATIVE DIAGNOSIS:  Nonhealing right heel wound. PROCEDURE PERFORMED:  Right below-knee amputation. SURGEON:  Sergey Serna MD    ASSISTANT:  [place].    ANESTHESIA:  General.    COMPLICATIONS:  None. SPECIMENS REMOVED:  Right distal lower leg, ankle, and foot. IMPLANTS:  None. ESTIMATED BLOOD LOSS:  100 mL. INDICATIONS:  The patient is a 61-year-old diabetic male with end-stage renal disease on hemodialysis. He had a left below-knee amputation several weeks ago for a nonhealing left foot wound. He has had a right heel decubitus wound for several months. The wound has worsened. Based on the extent of his tissue loss, it is not felt that his foot is salvageable and he will undergo below-knee amputation. PROCEDURE:  The patient's right leg was prepped and draped. A circumferential incision was made midway between the right knee and ankle to include a posterior skin and muscle flap. The soft tissues were divided anteriorly, medially, and laterally exposing the tibia and fibula. The tibia was divided with a giggly saw and the fibula with a bone cutter. The posterior soft tissues were then divided using an amputation knife removing the distal lower leg and foot. Hemostasis was obtained. The incision was closed with Vicryl subcutaneous and fascial sutures and skin staples. Dressings were applied and the patient was returned to the recovery room in stable condition.         Francia Causey MD      [id number]  D:  2020 11:16  T:  2020 13:20  JOB #:  [id number]

## 2020-08-17 NOTE — PROGRESS NOTES
[facility]   [address] [facility], [address] [place] [address], [facility]  Phone: [phone]   [phone]       Nephrology Progress Note  Melissa Ortiz     1955     [id number]  Date of Admission : 8/12/2020 08/17/20    CC: Follow up for ESRD     Assessment and Plan   ESRD- HD   - dialyzes MWF @ [address] . F/B Dr Gracy Muller  - HD today w/ PRBCs    PVD   - s/p Left BKA 7/20   - R BKA 8/17    Anemia in CKD:  - hgb 6.9 preop  - will transfuse 2 units w/ dialysis today    HTN   - continue home meds     Sec [facility]     Type II DM - per Primary team      Interval History:  Seen in the PACU post BKA. Lethargic. BP and O2 sats stable.  hgb 6.9 this AM preop. For HD later today. Unable to obtain ROS at this time. Review of Systems: A comprehensive review of systems was negative except for that written in the HPI.     Current Medications:   Current Facility-Administered Medications   Medication Dose Route Frequency    lactated Ringers infusion 100 mL  100 mL IntraVENous CONTINUOUS    0.9% sodium chloride infusion  25 mL/hr IntraVENous CONTINUOUS    oxyCODONE IR (ROXICODONE) tablet 5 mg  5 mg Oral PRN    fentaNYL citrate (PF) injection 25 mcg  25 mcg IntraVENous Multiple    morphine 10 mg/ml injection 2 mg  2 mg IntraVENous Multiple    HYDROmorphone (PF) (DILAUDID) injection 0.2 mg  0.2 mg IntraVENous Q10MIN    ondansetron (ZOFRAN) injection 4 mg  4 mg IntraVENous PRN    midazolam (VERSED) injection 0.5 mg  0.5 mg IntraVENous Q5MIN PRN    diphenhydrAMINE (BENADRYL) injection 12.5 mg  12.5 mg IntraVENous PRN    ePHEDrine in NS (PF) (MISTOLE) 10 mg/mL in NS syringe 5 mg  5 mg IntraVENous PRN    diphenhydrAMINE (BENADRYL) 50 mg/mL injection        Vancomycin Random 8/17 with AM labs    Other ONCE    insulin lispro (HUMALOG) injection 10 Units  10 Units SubCUTAneous TIDAC    insulin glargine (LANTUS) injection 20 Units  20 Units SubCUTAneous DAILY    timolol (TIMOPTIC) 0.25% ophthalmic solution  1 Drop Both Eyes BID    cefepime (MAXIPIME) 1 g in 0.9% sodium chloride (MBP/ADV) 50 mL  1 g IntraVENous Q24H    epoetin dyan-epbx (RETACRIT) injection 20,000 Units  20,000 Units SubCUTAneous Q MON, WED & FRI    clopidogreL (PLAVIX) tablet 75 mg  75 mg Oral DAILY    isosorbide mononitrate ER (IMDUR) tablet 60 mg  60 mg Oral DAILY    latanoprost (XALATAN) 0.005 % ophthalmic solution 1 Drop  1 Drop Both Eyes QHS    atorvastatin (LIPITOR) tablet 20 mg  20 mg Oral DAILY    tamsulosin (FLOMAX) capsule 0.4 mg  0.4 mg Oral QHS    sodium chloride (NS) flush 5-40 mL  5-40 mL IntraVENous Q8H    sodium chloride (NS) flush 5-40 mL  5-40 mL IntraVENous PRN    acetaminophen (TYLENOL) tablet 650 mg  650 mg Oral Q4H PRN    heparin (porcine) injection 5,000 Units  5,000 Units SubCUTAneous Q8H    glucose chewable tablet 16 g  4 Tab Oral PRN    glucagon (GLUCAGEN) injection 1 mg  1 mg IntraMUSCular PRN    dextrose 10% infusion 0-250 mL  0-250 mL IntraVENous PRN    Vancomycin- Pharmacy to Dose    Other Rx Dosing/Monitoring    insulin lispro (HUMALOG) injection   SubCUTAneous AC&HS      Allergies   Allergen Reactions    Augmentin [Amoxicillin-Pot Clavulanate] Hives    Penicillins Other (comments)     States skin peeling with penicillin    Zoster Vaccine Live Hives       Objective:  Vitals:    Vitals:    08/17/20 1145 08/17/20 1150 08/17/20 1155 08/17/20 1200   BP: 131/47   130/48   Pulse: 75 74 76 76   Resp: 24 23 22 27   Temp:       SpO2: 100% 100% 100% 100%   Weight:       Height:         Intake and Output:  08/17 0701 - 08/17 1900  In: 200 [I.V.:200]  Out: 20   08/15 1901 - 08/17 0700  In: 1631.3 [I.V.:1631.3]  Out: -     Physical Examination:    General: NAD,Conversant   Neck:  Supple, no mass  Resp:  Lungs CTA B/L, no wheezing , normal respiratory effort  CV:  RRR,  no murmur or rub,trace LE edema  GI:  Soft, NT, + Bowel sounds, no hepatosplenomegaly  Neurologic:  Non focal  Psych:             AAO x 3 appropriate affect   Ext                  Left BKA. R heel in dressing   Access          LUE AVF + thrill     []    High complexity decision making was performed  []    Patient is at high-risk of decompensation with multiple organ involvement    Lab Data Personally Reviewed: I have reviewed all the pertinent labs, microbiology data and radiology studies during assessment.     Recent Labs     08/17/20  0423 08/16/20  0120 08/15/20  0319    136 135*   K 3.7 3.8 3.2*    101 100   CO2 25 26 28   * 239* 171*   BUN 53* 41* 26*   CREA 9.20* 7.06* 5.18*   CA 8.7 8.5 8.6   MG 1.9  --   --    PHOS 2.0*  --   --      Recent Labs     08/17/20 0423   WBC 12.0*   HGB 6.9*   HCT 23.0*        No results found for: SDES  Lab Results   Component Value Date/Time    Culture result: NO GROWTH 4 DAYS 08/13/2020 12:32 AM    Culture result: NO GROWTH 5 DAYS 08/12/2020 03:55 PM    Culture result: SCANT  MIXED SKIN MAHESH ISOLATED   03/17/2017 03:08 PM    Culture result: NO ANAEROBES ISOLATED 03/17/2017 03:08 PM    Culture result: NO GROWTH 2 DAYS 06/24/2016 10:25 PM     Recent Results (from the past 24 hour(s))   GLUCOSE, POC    Collection Time: 08/16/20  4:46 PM   Result Value Ref Range    Glucose (POC) 252 (H) 65 - 100 mg/dL    Performed by [employer], POC    Collection Time: 08/16/20  9:28 PM   Result Value Ref Range    Glucose (POC) 213 (H) 65 - 100 mg/dL    Performed by [facility]    METABOLIC PANEL, BASIC    Collection Time: 08/17/20  4:23 AM   Result Value Ref Range    Sodium 136 136 - 145 mmol/L    Potassium 3.7 3.5 - 5.1 mmol/L    Chloride 102 97 - 108 mmol/L    CO2 25 21 - 32 mmol/L    Anion gap 9 5 - 15 mmol/L    Glucose 155 (H) 65 - 100 mg/dL    BUN 53 (H) 6 - 20 MG/DL    Creatinine 9.20 (H) 0.70 - 1.30 MG/DL    BUN/Creatinine ratio 6 (L) 12 - 20      GFR est AA 7 (L) >60 ml/min/1.73m2    GFR est non-AA 6 (L) >60 ml/min/1.73m2    Calcium 8.7 8.5 - 10.1 MG/DL   CBC W/O DIFF    Collection Time: 08/17/20  4:23 AM   Result Value Ref Range    WBC 12.0 (H) 4.1 - 11.1 K/uL    RBC 2.29 (L) 4.10 - 5.70 M/uL    HGB 6.9 (L) 12.1 - 17.0 g/dL    HCT 23.0 (L) 36.6 - 50.3 %    .4 (H) 80.0 - 99.0 FL    MCH 30.1 26.0 - 34.0 PG    MCHC 30.0 30.0 - 36.5 g/dL    RDW 15.9 (H) 11.5 - 14.5 %    PLATELET 523 046 - 264 K/uL    MPV 9.5 8.9 - 12.9 FL    NRBC 0.5 (H) 0  WBC    ABSOLUTE NRBC 0.06 (H) 0.00 - 0.01 K/uL   MAGNESIUM    Collection Time: 08/17/20  4:23 AM   Result Value Ref Range    Magnesium 1.9 1.6 - 2.4 mg/dL   PHOSPHORUS    Collection Time: 08/17/20  4:23 AM   Result Value Ref Range    Phosphorus 2.0 (L) 2.6 - 4.7 MG/DL   VANCOMYCIN, RANDOM    Collection Time: 08/17/20  4:23 AM   Result Value Ref Range    Vancomycin, random 19.0 UG/ML   GLUCOSE, POC    Collection Time: 08/17/20  4:46 AM   Result Value Ref Range    Glucose (POC) 181 (H) 65 - 100 mg/dL    Performed by Jaden Hargrove (SHAMEKA)    GLUCOSE, POC    Collection Time: 08/17/20  7:25 AM   Result Value Ref Range    Glucose (POC) 164 (H) 65 - 100 mg/dL    Performed by Julio CÁRDENAS (CON)    GLUCOSE, POC    Collection Time: 08/17/20  8:00 AM   Result Value Ref Range    Glucose (POC) 165 (H) 65 - 100 mg/dL    Performed by Re Aguilera    TYPE & SCREEN    Collection Time: 08/17/20  9:50 AM   Result Value Ref Range    Crossmatch Expiration 08/20/2020     ABO/Rh(D) PENDING     Antibody screen PENDING     Antibody ID PENDING    GLUCOSE, POC    Collection Time: 08/17/20 10:04 AM   Result Value Ref Range    Glucose (POC) 145 (H) 65 - 100 mg/dL    Performed by Kirit Garrido    GLUCOSE, POC    Collection Time: 08/17/20 11:26 AM   Result Value Ref Range    Glucose (POC) 174 (H) 65 - 100 mg/dL    Performed by Jeanna Grigsby            Total time spent with patient:  xxx   min. Care Plan discussed with:  Patient     Family      RN      Consulting Physician [address],         I have reviewed the flowsheets.   Chart and Pertinent Notes have been reviewed. No change in PMH ,family and social history from Consult note.       Prema Spence MD

## 2020-08-17 NOTE — PROGRESS NOTES
8/17/2020 -  JJ:  - RUR: 20%  - Disposition is for return to [place] [address]  - Patient will need COVID test prior to discharge  - Patient will need BLS transport  - Patient to continue HD with MWF schedule at [facility]  - Patient will need 2nd IM Letter prior to discharge    - Patient to have right BKA today, 8/17  - Patient to have HD post-op  - Patient will need to be seen by PT and OT post-op  - ABX continue  CRM: Binta Lew, MPH, [address] [facility]; Z: [phone]

## 2020-08-17 NOTE — PROGRESS NOTES
2330 Bedside and Verbal shift change report given to Melissa Rizzo (oncoming nurse) by Karel Dominguez RN (offgoing nurse). Report included the following information SBAR, Kardex, MAR and Alarm Parameters . 0730 Bedside shift change report given to RN (oncoming nurse) by Silvano Echeverria RN (offgoing nurse). Report included the following information SBAR, Kardex, MAR and Alarm Parameters .

## 2020-08-18 LAB
ABO + RH BLD: NORMAL
ANION GAP SERPL CALC-SCNC: 9 MMOL/L (ref 5–15)
ANTIGENS PRESENT RBC DONR: NORMAL
ANTIGENS PRESENT RBC DONR: NORMAL
BACTERIA SPEC CULT: NORMAL
BACTERIA SPEC CULT: NORMAL
BLD PROD TYP BPU: NORMAL
BLD PROD TYP BPU: NORMAL
BLOOD BANK CMNT PATIENT-IMP: NORMAL
BLOOD GROUP ANTIBODIES SERPL: NORMAL
BPU ID: NORMAL
BPU ID: NORMAL
BUN SERPL-MCNC: 35 MG/DL (ref 6–20)
BUN/CREAT SERPL: 5 (ref 12–20)
CALCIUM SERPL-MCNC: 8.9 MG/DL (ref 8.5–10.1)
CHLORIDE SERPL-SCNC: 104 MMOL/L (ref 97–108)
CO2 SERPL-SCNC: 25 MMOL/L (ref 21–32)
CREAT SERPL-MCNC: 6.76 MG/DL (ref 0.7–1.3)
CROSSMATCH RESULT,%XM: NORMAL
CROSSMATCH RESULT,%XM: NORMAL
ERYTHROCYTE [DISTWIDTH] IN BLOOD BY AUTOMATED COUNT: 15.6 % (ref 11.5–14.5)
GLUCOSE BLD STRIP.AUTO-MCNC: 135 MG/DL (ref 65–100)
GLUCOSE BLD STRIP.AUTO-MCNC: 149 MG/DL (ref 65–100)
GLUCOSE BLD STRIP.AUTO-MCNC: 164 MG/DL (ref 65–100)
GLUCOSE BLD STRIP.AUTO-MCNC: 89 MG/DL (ref 65–100)
GLUCOSE SERPL-MCNC: 171 MG/DL (ref 65–100)
HBV SURFACE AB SER QL: REACTIVE
HBV SURFACE AB SER-ACNC: >1000 MIU/ML
HCT VFR BLD AUTO: 30.2 % (ref 36.6–50.3)
HGB BLD-MCNC: 9.5 G/DL (ref 12.1–17)
MCH RBC QN AUTO: 30.7 PG (ref 26–34)
MCHC RBC AUTO-ENTMCNC: 31.5 G/DL (ref 30–36.5)
MCV RBC AUTO: 97.7 FL (ref 80–99)
NRBC # BLD: 0.05 K/UL (ref 0–0.01)
NRBC BLD-RTO: 0.4 PER 100 WBC
PLATELET # BLD AUTO: 312 K/UL (ref 150–400)
PMV BLD AUTO: 9.6 FL (ref 8.9–12.9)
POTASSIUM SERPL-SCNC: 3.6 MMOL/L (ref 3.5–5.1)
RBC # BLD AUTO: 3.09 M/UL (ref 4.1–5.7)
SERVICE CMNT-IMP: ABNORMAL
SERVICE CMNT-IMP: NORMAL
SODIUM SERPL-SCNC: 138 MMOL/L (ref 136–145)
SPECIMEN EXP DATE BLD: NORMAL
STATUS OF UNIT,%ST: NORMAL
STATUS OF UNIT,%ST: NORMAL
UNIT DIVISION, %UDIV: 0
UNIT DIVISION, %UDIV: 0
WBC # BLD AUTO: 13 K/UL (ref 4.1–11.1)

## 2020-08-18 PROCEDURE — 74011000258 HC RX REV CODE- 258

## 2020-08-18 PROCEDURE — 74011636637 HC RX REV CODE- 636/637

## 2020-08-18 PROCEDURE — 80048 BASIC METABOLIC PNL TOTAL CA: CPT

## 2020-08-18 PROCEDURE — 97535 SELF CARE MNGMENT TRAINING: CPT | Performed by: OCCUPATIONAL THERAPIST

## 2020-08-18 PROCEDURE — 85027 COMPLETE CBC AUTOMATED: CPT

## 2020-08-18 PROCEDURE — 97165 OT EVAL LOW COMPLEX 30 MIN: CPT | Performed by: OCCUPATIONAL THERAPIST

## 2020-08-18 PROCEDURE — 65270000029 HC RM PRIVATE

## 2020-08-18 PROCEDURE — 82962 GLUCOSE BLOOD TEST: CPT

## 2020-08-18 PROCEDURE — 97161 PT EVAL LOW COMPLEX 20 MIN: CPT

## 2020-08-18 PROCEDURE — 74011250636 HC RX REV CODE- 250/636

## 2020-08-18 PROCEDURE — 86706 HEP B SURFACE ANTIBODY: CPT

## 2020-08-18 PROCEDURE — 74011250637 HC RX REV CODE- 250/637: Performed by: NURSE PRACTITIONER

## 2020-08-18 PROCEDURE — 74011250637 HC RX REV CODE- 250/637

## 2020-08-18 PROCEDURE — 36415 COLL VENOUS BLD VENIPUNCTURE: CPT

## 2020-08-18 PROCEDURE — 97530 THERAPEUTIC ACTIVITIES: CPT

## 2020-08-18 RX ORDER — MORPHINE SULFATE 2 MG/ML
2 INJECTION, SOLUTION INTRAMUSCULAR; INTRAVENOUS
Status: DISCONTINUED | OUTPATIENT
Start: 2020-08-18 | End: 2020-08-22 | Stop reason: HOSPADM

## 2020-08-18 RX ORDER — HYDROCODONE BITARTRATE AND ACETAMINOPHEN 10; 325 MG/1; MG/1
1 TABLET ORAL
Status: DISCONTINUED | OUTPATIENT
Start: 2020-08-18 | End: 2020-08-22 | Stop reason: HOSPADM

## 2020-08-18 RX ADMIN — TIMOLOL MALEATE 1 DROP: 2.5 SOLUTION/ DROPS OPHTHALMIC at 09:02

## 2020-08-18 RX ADMIN — HYDROCODONE BITARTRATE AND ACETAMINOPHEN 1 TABLET: 5; 325 TABLET ORAL at 18:17

## 2020-08-18 RX ADMIN — ISOSORBIDE MONONITRATE 60 MG: 30 TABLET ORAL at 08:55

## 2020-08-18 RX ADMIN — CLOPIDOGREL BISULFATE 75 MG: 75 TABLET ORAL at 08:55

## 2020-08-18 RX ADMIN — INSULIN LISPRO 10 UNITS: 100 INJECTION, SOLUTION INTRAVENOUS; SUBCUTANEOUS at 09:01

## 2020-08-18 RX ADMIN — CEFEPIME HYDROCHLORIDE 1 G: 1 INJECTION, POWDER, FOR SOLUTION INTRAMUSCULAR; INTRAVENOUS at 18:09

## 2020-08-18 RX ADMIN — HYDROCODONE BITARTRATE AND ACETAMINOPHEN 1 TABLET: 5; 325 TABLET ORAL at 13:05

## 2020-08-18 RX ADMIN — LATANOPROST 1 DROP: 50 SOLUTION OPHTHALMIC at 01:50

## 2020-08-18 RX ADMIN — HEPARIN SODIUM 5000 UNITS: 5000 INJECTION INTRAVENOUS; SUBCUTANEOUS at 21:11

## 2020-08-18 RX ADMIN — ATORVASTATIN CALCIUM 20 MG: 20 TABLET, FILM COATED ORAL at 08:55

## 2020-08-18 RX ADMIN — HYDROCODONE BITARTRATE AND ACETAMINOPHEN 1 TABLET: 5; 325 TABLET ORAL at 08:54

## 2020-08-18 RX ADMIN — SODIUM CHLORIDE 750 MG: 900 INJECTION, SOLUTION INTRAVENOUS at 01:42

## 2020-08-18 RX ADMIN — INSULIN LISPRO 10 UNITS: 100 INJECTION, SOLUTION INTRAVENOUS; SUBCUTANEOUS at 17:33

## 2020-08-18 RX ADMIN — TAMSULOSIN HYDROCHLORIDE 0.4 MG: 0.4 CAPSULE ORAL at 21:11

## 2020-08-18 RX ADMIN — HYDROCODONE BITARTRATE AND ACETAMINOPHEN 1 TABLET: 5; 325 TABLET ORAL at 04:23

## 2020-08-18 RX ADMIN — LATANOPROST 1 DROP: 50 SOLUTION OPHTHALMIC at 21:10

## 2020-08-18 RX ADMIN — INSULIN LISPRO 10 UNITS: 100 INJECTION, SOLUTION INTRAVENOUS; SUBCUTANEOUS at 13:05

## 2020-08-18 RX ADMIN — HEPARIN SODIUM 5000 UNITS: 5000 INJECTION INTRAVENOUS; SUBCUTANEOUS at 06:26

## 2020-08-18 RX ADMIN — TIMOLOL MALEATE 1 DROP: 2.5 SOLUTION/ DROPS OPHTHALMIC at 19:27

## 2020-08-18 RX ADMIN — Medication 10 ML: at 18:12

## 2020-08-18 RX ADMIN — HEPARIN SODIUM 5000 UNITS: 5000 INJECTION INTRAVENOUS; SUBCUTANEOUS at 13:05

## 2020-08-18 RX ADMIN — INSULIN GLARGINE 20 UNITS: 100 INJECTION, SOLUTION SUBCUTANEOUS at 08:55

## 2020-08-18 NOTE — PROGRESS NOTES
[address] [facility]  Hospitalist Group                                                                                          Hospitalist Progress Note  Mearl Goodpasture, MD  Answering service: [phone] OR [phone] from in house phone        Date of Service:  2020  NAME:  Ravi Jorge  :  1955  MRN:  [id number]      Admission Summary:   Ravi Jorge is a 75 y.o. male who presents with right foot ulcer. Patient presented to the ER with a right heel wound. Per patient and the patient's family member that the wound has been there for about 6 to 8 months, he has been getting wound care for the same, but the wound got worse and starting hurting. Today it was noticed that the wound was draining, patient was sent to the ER for further management and evaluation. Patient continues to have pain and discomfort in his right lower extremity. Patient recently had a BKA in July due to peripheral arterial disease. Interval history / Subjective:   Patient was without complaints on exam  Seen by vascular surgery with right BKA done 20  Significant pain this am- increased pain med doses     Assessment & Plan:     Sepsis due to Right foot cellulitis/gangrene with osteomyelitis :   broad-spectrum IV antibiotics= cont for 48hrs postop- then DC,    Heel was initially debrided - then Vascular surgery completed R BKA- 20  Cont plavix for PVD     ESRD:   hemodialysis MWF,    Nephrology consulted and following     anemia of chronic disease:   Monitor Hb=9.5    2 units were transfused with dialysis 20     Hypokalemia: repleted,  Monitor     Diabetes mellitus type 2: Poorly controlled on admission w hyperglycemia  Improved glucose control with current insulin, Accu-Cheks diet control and close monitoring.   adjusting insulin regimen    HLD- cont statin  BPH- cont flomax  Glaucoma- cont home eyedrops    Code status: FULL  DVT prophylaxis: Heparin    Care Plan discussed with: Patient/Family  Anticipated Disposition: SNF/LTC  Anticipated Discharge: Greater than 48 hours     Hospital Problems  Date Reviewed: 3/17/2017          Codes Class Noted POA    Sepsis ([facility] [address] ICD-10-CM: A41.9  ICD-9-CM: 038.9, 995.91  8/12/2020 Unknown                Review of Systems:   A comprehensive review of systems was negative except for that written in the HPI. Vital Signs:    Last 24hrs VS reviewed since prior progress note. Most recent are:  Visit Vitals  BP (!) 122/93 (BP 1 Location: Right arm, BP Patient Position: At rest)   Pulse 88   Temp 99.4 °F (37.4 °C)   Resp 18   Ht 6' 1\" (1.854 m)   Wt 83 kg (182 lb 15.7 oz)   SpO2 96%   BMI 24.14 kg/m²         Intake/Output Summary (Last 24 hours) at 8/18/2020 0810  Last data filed at 8/17/2020 2020  Gross per 24 hour   Intake 200 ml   Output 2520 ml   Net -2320 ml        Physical Examination:             Constitutional:  No acute distress,awake and alert    ENT:  Oral mucosa dry   Resp:  CTA bilaterally. No wheezing/rhonchi/rales. No accessory muscle use   CV:  Regular rhythm, normal rate, no murmurs, gallops, rubs    GI:  Soft, non distended, non tender. normoactive bowel sounds,    Musculoskeletal:  left BKA, staples intact and healing well, R BKA in brace    Neurologic:  no focal weakness     Psych:  flat affect       Data Review:    Review and/or order of clinical lab test  Review and/or order of tests in the radiology section of CPT  Review and/or order of tests in the medicine section of CPT      Labs:     Recent Labs     08/18/20 0521 08/17/20 0423   WBC 13.0* 12.0*   HGB 9.5* 6.9*   HCT 30.2* 23.0*    380     Recent Labs     08/18/20  0521 08/17/20 0423 08/16/20  0120    136 136   K 3.6 3.7 3.8    102 101   CO2 25 25 26   BUN 35* 53* 41*   CREA 6.76* 9.20* 7.06*   * 155* 239*   CA 8.9 8.7 8.5   MG  --  1.9  --    PHOS  --  2.0*  --      No results for input(s): ALT, AP, TBIL, TBILI, TP, ALB, GLOB, GGT, AML, LPSE in the last 72 hours.     No lab exists for component: SGOT, GPT, AMYP, HLPSE  No results for input(s): INR, PTP, APTT, INREXT, INREXT in the last 72 hours. No results for input(s): FE, TIBC, PSAT, FERR in the last 72 hours. No results found for: FOL, RBCF   No results for input(s): PH, PCO2, PO2 in the last 72 hours. No results for input(s): CPK, CKNDX, TROIQ in the last 72 hours.     No lab exists for component: CPKMB  No results found for: CHOL, CHOLX, CHLST, CHOLV, HDL, HDLP, LDL, LDLC, DLDLP, TGLX, TRIGL, TRIGP, CHHD, CHHDX  Lab Results   Component Value Date/Time    Glucose (POC) 90 08/17/2020 10:11 PM    Glucose (POC) 186 (H) 08/17/2020 01:10 PM    Glucose (POC) 174 (H) 08/17/2020 11:26 AM    Glucose (POC) 145 (H) 08/17/2020 10:04 AM    Glucose (POC) 165 (H) 08/17/2020 08:00 AM     No results found for: COLOR, APPRN, SPGRU, REFSG, ALFONSO, PROTU, GLUCU, KETU, BILU, UROU, SUNSHINE, LEUKU, GLUKE, EPSU, BACTU, WBCU, RBCU, CASTS, UCRY      Medications Reviewed:     Current Facility-Administered Medications   Medication Dose Route Frequency    0.9% sodium chloride infusion 250 mL  250 mL IntraVENous PRN    HYDROcodone-acetaminophen (NORCO) 5-325 mg per tablet 1 Tab  1 Tab Oral Q4H PRN    insulin lispro (HUMALOG) injection 10 Units  10 Units SubCUTAneous TIDAC    insulin glargine (LANTUS) injection 20 Units  20 Units SubCUTAneous DAILY    timolol (TIMOPTIC) 0.25% ophthalmic solution  1 Drop Both Eyes BID    cefepime (MAXIPIME) 1 g in 0.9% sodium chloride (MBP/ADV) 50 mL  1 g IntraVENous Q24H    epoetin dyan-epbx (RETACRIT) injection 20,000 Units  20,000 Units SubCUTAneous Q MON, WED & FRI    clopidogreL (PLAVIX) tablet 75 mg  75 mg Oral DAILY    isosorbide mononitrate ER (IMDUR) tablet 60 mg  60 mg Oral DAILY    latanoprost (XALATAN) 0.005 % ophthalmic solution 1 Drop  1 Drop Both Eyes QHS    atorvastatin (LIPITOR) tablet 20 mg  20 mg Oral DAILY    tamsulosin (FLOMAX) capsule 0.4 mg  0.4 mg Oral QHS    sodium chloride (NS) flush 5-40 mL 5-40 mL IntraVENous PRN    acetaminophen (TYLENOL) tablet 650 mg  650 mg Oral Q4H PRN    heparin (porcine) injection 5,000 Units  5,000 Units SubCUTAneous Q8H    glucose chewable tablet 16 g  4 Tab Oral PRN    glucagon (GLUCAGEN) injection 1 mg  1 mg IntraMUSCular PRN    dextrose 10% infusion 0-250 mL  0-250 mL IntraVENous PRN    Vancomycin- Pharmacy to Dose    Other Rx Dosing/Monitoring    insulin lispro (HUMALOG) injection   SubCUTAneous AC&HS     ______________________________________________________________________  EXPECTED LENGTH OF STAY: 9d 19h  ACTUAL LENGTH OF STAY:          6                 Mearl Goodpasture, MD

## 2020-08-18 NOTE — PROGRESS NOTES
[facility] Adult  Hospitalist Group                                                                                          Hospitalist Progress Note  Carol Reese MD  Answering service: [phone] OR [phone] from in house phone        Date of Service:  2020  NAME:  Hansa Moreno  :  1955  MRN:  [id number]      Admission Summary:   Hansa Moreno is a 75 y.o. male who presents with right foot ulcer. Patient presented to the ER with a right heel wound. Per patient and the patient's family member that the wound has been there for about 6 to 8 months, he has been getting wound care for the same, but the wound got worse and starting hurting. Today it was noticed that the wound was draining, patient was sent to the ER for further management and evaluation. Patient continues to have pain and discomfort in his right lower extremity. Patient recently had a BKA in July due to peripheral arterial disease. Interval history / Subjective:   Patient was without complaints on exam  Seen by vascular surgery with right BKA done earlier today     Assessment & Plan:     Sepsis due to Right foot cellulitis/gangrene with osteomyelitis :   broad-spectrum IV antibiotics,    Heel was debrided but likely needs amputation to reach a cure  Vascular surgery completed R BKA- 20  Cont plavix for PVD     ESRD:   hemodialysis MWF,    Nephrology consulted and following     anemia of chronic disease:   Monitor Hb, <7;   2 units to be transfused with dialysis today     Hypokalemia: repleted,  Monitor     Diabetes mellitus type 2: Poorly controlled on admission w hyperglycemia  cont sliding scale insulin, Accu-Cheks diet control and close monitoring.   adjusting insulin regimen    HLD- cont statin  BPH- cont flomax  Glaucoma- cont home eyedrops    Code status: FULL  DVT prophylaxis: Heparin    Care Plan discussed with: Patient/Family  Anticipated Disposition: SNF/LTC  Anticipated Discharge: Greater than 48 hours Hospital Problems  Date Reviewed: 3/17/2017          Codes Class Noted POA    Sepsis [facility]) ICD-10-CM: A41.9  ICD-9-CM: 038.9, 995.91  8/12/2020 Unknown                Review of Systems:   A comprehensive review of systems was negative except for that written in the HPI. Vital Signs:    Last 24hrs VS reviewed since prior progress note. Most recent are:  Visit Vitals  /74 (BP 1 Location: Right arm, BP Patient Position: At rest)   Pulse 86   Temp 98.6 °F (37 °C)   Resp 16   Ht 6' 1\" (1.854 m)   Wt 88.1 kg (194 lb 3.6 oz)   SpO2 99%   BMI 25.62 kg/m²         Intake/Output Summary (Last 24 hours) at 8/17/2020 2307  Last data filed at 8/17/2020 2020  Gross per 24 hour   Intake 200 ml   Output 2520 ml   Net -2320 ml        Physical Examination:             Constitutional:  No acute distress,awake and alert    ENT:  Oral mucosa dry   Resp:  CTA bilaterally. No wheezing/rhonchi/rales. No accessory muscle use   CV:  Regular rhythm, normal rate, no murmurs, gallops, rubs    GI:  Soft, non distended, non tender. normoactive bowel sounds,    Musculoskeletal:  left BKA, staples intact and healing well, R BKA in brace    Neurologic:  no focal weakness     Psych:  flat affect       Data Review:    Review and/or order of clinical lab test  Review and/or order of tests in the radiology section of CPT  Review and/or order of tests in the medicine section of CPT      Labs:     Recent Labs     08/17/20 0423   WBC 12.0*   HGB 6.9*   HCT 23.0*        Recent Labs     08/17/20  0423 08/16/20  0120 08/15/20  0319    136 135*   K 3.7 3.8 3.2*    101 100   CO2 25 26 28   BUN 53* 41* 26*   CREA 9.20* 7.06* 5.18*   * 239* 171*   CA 8.7 8.5 8.6   MG 1.9  --   --    PHOS 2.0*  --   --      No results for input(s): ALT, AP, TBIL, TBILI, TP, ALB, GLOB, GGT, AML, LPSE in the last 72 hours.     No lab exists for component: SGOT, GPT, AMYP, HLPSE  No results for input(s): INR, PTP, APTT, INREXT, INREXT in the last 72 hours. No results for input(s): FE, TIBC, PSAT, FERR in the last 72 hours. No results found for: FOL, RBCF   No results for input(s): PH, PCO2, PO2 in the last 72 hours. No results for input(s): CPK, CKNDX, TROIQ in the last 72 hours.     No lab exists for component: CPKMB  No results found for: CHOL, CHOLX, CHLST, CHOLV, HDL, HDLP, LDL, LDLC, DLDLP, TGLX, TRIGL, TRIGP, CHHD, CHHDX  Lab Results   Component Value Date/Time    Glucose (POC) 90 08/17/2020 10:11 PM    Glucose (POC) 186 (H) 08/17/2020 01:10 PM    Glucose (POC) 174 (H) 08/17/2020 11:26 AM    Glucose (POC) 145 (H) 08/17/2020 10:04 AM    Glucose (POC) 165 (H) 08/17/2020 08:00 AM     No results found for: COLOR, APPRN, SPGRU, REFSG, ALFONSO, PROTU, GLUCU, KETU, BILU, UROU, SUNSHINE, LEUKU, GLUKE, EPSU, BACTU, WBCU, RBCU, CASTS, UCRY      Medications Reviewed:     Current Facility-Administered Medications   Medication Dose Route Frequency    diphenhydrAMINE (BENADRYL) 50 mg/mL injection        0.9% sodium chloride infusion 250 mL  250 mL IntraVENous PRN    vancomycin (VANCOCIN) 750 mg in 0.9% sodium chloride (MBP/ADV) 250 mL  750 mg IntraVENous ONCE    HYDROcodone-acetaminophen (NORCO) 5-325 mg per tablet 1 Tab  1 Tab Oral Q4H PRN    insulin lispro (HUMALOG) injection 10 Units  10 Units SubCUTAneous TIDAC    insulin glargine (LANTUS) injection 20 Units  20 Units SubCUTAneous DAILY    timolol (TIMOPTIC) 0.25% ophthalmic solution  1 Drop Both Eyes BID    cefepime (MAXIPIME) 1 g in 0.9% sodium chloride (MBP/ADV) 50 mL  1 g IntraVENous Q24H    epoetin dyan-epbx (RETACRIT) injection 20,000 Units  20,000 Units SubCUTAneous Q MON, WED & FRI    clopidogreL (PLAVIX) tablet 75 mg  75 mg Oral DAILY    isosorbide mononitrate ER (IMDUR) tablet 60 mg  60 mg Oral DAILY    latanoprost (XALATAN) 0.005 % ophthalmic solution 1 Drop  1 Drop Both Eyes QHS    atorvastatin (LIPITOR) tablet 20 mg  20 mg Oral DAILY    tamsulosin (FLOMAX) capsule 0.4 mg  0.4 mg Oral QHS    sodium chloride (NS) flush 5-40 mL  5-40 mL IntraVENous PRN    acetaminophen (TYLENOL) tablet 650 mg  650 mg Oral Q4H PRN    heparin (porcine) injection 5,000 Units  5,000 Units SubCUTAneous Q8H    glucose chewable tablet 16 g  4 Tab Oral PRN    glucagon (GLUCAGEN) injection 1 mg  1 mg IntraMUSCular PRN    dextrose 10% infusion 0-250 mL  0-250 mL IntraVENous PRN    Vancomycin- Pharmacy to Dose    Other Rx Dosing/Monitoring    insulin lispro (HUMALOG) injection   SubCUTAneous AC&HS     ______________________________________________________________________  EXPECTED LENGTH OF STAY: 9d 19h  ACTUAL LENGTH OF STAY:          5                 Brittany Brito MD

## 2020-08-18 NOTE — PROGRESS NOTES
Problem: Mobility Impaired (Adult and Pediatric)  Goal: *Acute Goals and Plan of Care (Insert Text)  Description: FUNCTIONAL STATUS PRIOR TO ADMISSION: The patient was functional at the wheelchair level and required moderate assistance for transfers to the chair using sliding board. HOME SUPPORT PRIOR TO ADMISSION: The patient lived at [address] [place] [address], reporting receiving PT and OT. Physical Therapy Goals  Initiated 8/18/2020  1. Patient will move from supine to sit and sit to supine , scoot up and down, and roll side to side in bed with supervision/set-up within 7 day(s). 2.  Patient will tolerate sitting EOB for approx 5-8 min x supervision  within 7 day(s). 3.  Patient will transfer from bed to chair and chair to bed with moderate assistance  using the least restrictive device within 7 day(s). 4.  Patient will perform sit to stand with moderate assistance  within 7 day(s). Outcome: Not Met    PHYSICAL THERAPY EVALUATION  Patient: Debra Gracia (3 y.o. male)  Date: 8/18/2020  Primary Diagnosis: Sepsis ([facility] [A41.9]  Procedure(s) (LRB):  RIGHT BELOW KNEE AMPUTATION (Right) 1 Day Post-Op   Precautions:   Fall(BLE BKA)    ASSESSMENT  Based on the objective data described below, the patient presents with decreased activity tolerance, balance deficits, weakness, and post op BKA pain. Pt questionable historian and increased time to respond to questions. Pt agreeable to attempt sitting EOB. Pt required mod A and assistance to move RLE toward EOB. Pt unable to tolerate sitting EOB without support and reporting significant pain. Pt returned to supine, required total A to reposition. Educated pt on knee flexion/extension on LLE and bilateral SLR and quad sets. Pt will require continued education. Current Level of Function Impacting Discharge (mobility/balance): max A for bed mobility    Functional Outcome Measure:   The patient scored 5/100 on the Barthel Index outcome measure which is indicative of patient is 95% dependent on others. Other factors to consider for discharge: fall risk,  2 person A, [facility], from NH requiring assistance     Patient will benefit from skilled therapy intervention to address the above noted impairments. PLAN :  Recommendations and Planned Interventions: bed mobility training, transfer training, gait training, therapeutic exercises, neuromuscular re-education, patient and family training/education and therapeutic activities      Frequency/Duration: Patient will be followed by physical therapy:  5 times a week to address goals. Recommendation for discharge: (in order for the patient to meet his/her long term goals)  Therapy up to 5 days/week in SNF setting    This discharge recommendation:  Has not yet been discussed the attending provider and/or case management    IF patient discharges home will need the following DME: hospital bed and mechanical lift         SUBJECTIVE:   Patient stated I am a little confused.     OBJECTIVE DATA SUMMARY:   HISTORY:    Past Medical History:   Diagnosis Date    CAD (coronary artery disease)      2014, MI, CABG    Chronic kidney disease     on dialysis MWF    Diabetes ([facility]     ESRD (end stage renal disease) ([facility]     Glaucoma     Heart failure ([facility]     chronic diastolic HF per cardiology note    Hyperlipidemia     Hypertension     Ill-defined condition     overweight BMI 27.2    PAD (peripheral artery disease) ([facility]     Stroke [facility]) August 2011    residual left side weakness stroke x 2 per cardiology note     Past Surgical History:   Procedure Laterality Date    CARDIAC SURG PROCEDURE UNLIST      cavbg 2014 x3 VESSELS    HX CHOLECYSTECTOMY  02/2020    HX CORONARY ARTERY BYPASS GRAFT  February 2014    HX HEART CATHETERIZATION  01/24/2014    HX OTHER SURGICAL Left 06/2016    last toe on left foot amputated, for nonhealing toe ulcer    HX VASCULAR ACCESS Right Brenden Catheter    removed when fistula healed    VASCULAR SURGERY PROCEDURE UNLIST      fistula left arm       Personal factors and/or comorbidities impacting plan of care: [facility]    Home Situation  Home Environment: [address] [place] [address] Name: Kasi  One/Two Story Residence: One story  Living Alone: Yes  Support Systems: Family member(s)  Patient Expects to be Discharged to[de-identified] Assisted living  Current DME Used/Available at Home: Wheelchair    EXAMINATION/PRESENTATION/DECISION MAKING:   Critical Behavior:  Neurologic State: Drowsy, Eyes open spontaneously  Orientation Level: Oriented X4  Cognition: Appropriate decision making, Appropriate for age attention/concentration, Appropriate safety awareness, Follows commands     Hearing: Auditory  Auditory Impairment: None  Skin:  intact  Edema: none noted  Range Of Motion:  AROM: Generally decreased, functional           PROM: Generally decreased, functional           Strength:    Strength: Grossly decreased, non-functional    Coordination:  Coordination: Generally decreased, functional  Vision:      Functional Mobility:  Bed Mobility:  Rolling: Moderate assistance  Supine to Sit: Maximum assistance; Additional time  Sit to Supine: Maximum assistance; Additional time  Scooting: Total assistance     Balance:   Sitting: Impaired; With support  Sitting - Static: Poor (constant support)  Sitting - Dynamic: Poor (constant support)    Functional Measure:  Barthel Index:    Bathin  Bladder: 0  Bowels: 0  Groomin  Dressin  Feedin  Mobility: 0  Stairs: 0  Toilet Use: 0  Transfer (Bed to Chair and Back): 0  Total: 5/100       The Barthel ADL Index: Guidelines  1. The index should be used as a record of what a patient does, not as a record of what a patient could do. 2. The main aim is to establish degree of independence from any help, physical or verbal, however minor and for whatever reason. 3. The need for supervision renders the patient not independent.   4. A patient's performance should be established using the best available evidence. Asking the patient, friends/relatives and nurses are the usual sources, but direct observation and common sense are also important. However direct testing is not needed. 5. Usually the patient's performance over the preceding 24-48 hours is important, but occasionally longer periods will be relevant. 6. Middle categories imply that the patient supplies over 50 per cent of the effort. 7. Use of aids to be independent is allowed. Ki Keita., Barthel, D.W. (9728). Functional evaluation: the Barthel Index. [facility] (14)2. DESTINI Long, Brittany Titus., Joseph Morton., [place], [address] (1999). Measuring the change indisability after inpatient rehabilitation; comparison of the responsiveness of the Barthel Index and Functional [place] Measure. Journal of Neurology, Neurosurgery, and Psychiatry, 66(4), 126-972. Juan Vasquez, NMelaJ.A, MADDI Lu, & MATHEUS CortezA. (2004.) Assessment of post-stroke quality of life in cost-effectiveness studies: The usefulness of the Barthel Index and the EuroQoL-5D. Quality of Life Research, 15, 783-67            Physical Therapy Evaluation Charge Determination   History Examination Presentation Decision-Making   HIGH Complexity :3+ comorbidities / personal factors will impact the outcome/ POC  LOW Complexity : 1-2 Standardized tests and measures addressing body structure, function, activity limitation and / or participation in recreation  MEDIUM Complexity : Evolving with changing characteristics  LOW Complexity : FOTO score of       Based on the above components, the patient evaluation is determined to be of the following complexity level: LOW     Pain Rating:  Pt reported LLE pain, did not quantify    Activity Tolerance:   Poor  Please refer to the flowsheet for vital signs taken during this treatment.     After treatment patient left in no apparent distress:   Supine in bed, Call bell within reach and Side rails x 3    COMMUNICATION/EDUCATION:   The patients plan of care was discussed with: Occupational therapist and Registered nurse. Fall prevention education was provided and the patient/caregiver indicated understanding., Patient/family have participated as able in goal setting and plan of care. and Patient/family agree to work toward stated goals and plan of care.     Thank you for this referral.  Tay Aguilar, PT , DPT   Time Calculation: 16 mins

## 2020-08-18 NOTE — PROCEDURES
[facility] Ac[facility]  [phone]    Vitals   Pre   Post   Assessment   Pre   Post     Temp  98 97.9 LOC  Lethargic and oriented x 2 same   HR   75 79 Lungs   Clear  same   B/P  152/69 151/61 Cardiac   HRR same   Resp   16 16 Skin   Wounds to R foot and L BKA  same   Pain level  0 10 Edema    None noted   same   Orders:    Duration:   Start:   1650 End:    2020 Total:   3.5 hours   Dialyzer:   revaclear   K Bath:   2K   Ca Bath:   2.5CA   Na/Bicarb:   140   Target Fluid Removal:   2500 ml    Access     Type & Location:   Left upper arm AVF with no s/s of infection, + bruit and thrill, cannulated  with two 15 gauge needles without difficulty   Labs     Obtained/Reviewed   Critical Results Called   Date when labs were drawn-  Hgb-    HGB   Date Value Ref Range Status   08/17/2020 6.9 (L) 12.1 - 17.0 g/dL Final     K-    Potassium   Date Value Ref Range Status   08/17/2020 3.7 3.5 - 5.1 mmol/L Final     Ca-   Calcium   Date Value Ref Range Status   08/17/2020 8.7 8.5 - 10.1 MG/DL Final     Bun-   BUN   Date Value Ref Range Status   08/17/2020 53 (H) 6 - 20 MG/DL Final     Creat-   Creatinine   Date Value Ref Range Status   08/17/2020 9.20 (H) 0.70 - 1.30 MG/DL Final     Comment:     INVESTIGATED PER DELTA CHECK PROTOCOL        Medications/ Blood Products Given     Name   Dose   Route and Time           2 units of PRBC  @ 1717 and 1750        Blood Volume Processed (BVP):    71.2 Net Fluid   Removed:  2500 ml   Comments   Time Out Done: 1640  Primary Nurse Rpt Pre:LILA Frias  Primary Nurse Rpt Post:LILA Young  Pt Education: procedural  Care Plan: Continue current plan of care  Tx Summary: 1650- Received pt in suite post- op. Assessement and vitals were completed. Lans and medications were reviewed. HD was started using left upper arm aVF without any issues.   1717-One unit of PRBC was started   1745- 1 Unit of PRBC was completed, with no issues   1750- 2nd unit of PRBC was started  1845- 2nd unit of PRBC was completed with no issues  2020- HD was completed and all possible blood was returned the patient. Pt tlerated treatment well. Primary nurse was given a report. Admiting Diagnosis: R foot infection  Pt's previous clinic- [facility]  Consent signed - Informed Consent Verified: Yes (08/17/20 1650)  Janettita Consent - verified  Hepatitis Status- negative/immune 1/15/20  Machine #- Machine Number: [id number] (08/17/20 1650)  Telemetry status-remote  Pre-dialysis wt. -         TRANSFER - IN REPORT:    Verbal report received from [facility], [address] [place] [address] on [facility]  being received from [phone] for ordered procedure      Report consisted of patients Situation, Background, Assessment and   Recommendations(SBAR). Information from the following report(s) SBAR was reviewed with the receiving nurse. Opportunity for questions and clarification was provided. Assessment completed upon patients arrival to unit and care assumed. TRANSFER - OUT REPORT:    Verbal report given to [facility]  being transferred to [facility] for routine progression of care       Report consisted of patients Situation, Background, Assessment and   Recommendations(SBAR). Information from the following report(s) SBAR was reviewed with the receiving nurse. Opportunity for questions and clarification was provided.       Patient transported with:   [employer]

## 2020-08-18 NOTE — PROGRESS NOTES
Bedside and Verbal shift change report given to Matthew Bailey RN (oncoming nurse) by Jason Simons RN (offgoing nurse). Report included the following information SBAR, Kardex, Intake/Output, MAR and Recent Results.

## 2020-08-18 NOTE — PROGRESS NOTES
Vascular:    Stable POD #1 after right BKA, pain controlled    Leg dressing intact    Transfused on dialysis yesterday    Should be ready to return to SNF later in week.

## 2020-08-18 NOTE — PROGRESS NOTES
[facility]   [address] [facility], [address] [place] [address] Rd [address], [facility]  Phone: [phone]   [phone]       Nephrology Progress Note  Maged Decree     1955     [id number]  Date of Admission : 8/12/2020 08/18/20    CC: Follow up for ESRD     Assessment and Plan   ESRD- HD   - dialyzes MWF @ [address] . F/B Dr Sulma Rivero  - HD tomorrow per routine    PVD   - s/p Left BKA 7/20   - R BKA 8/17    Anemia in CKD:  - transfused 2 units on 8/17  - ARSALAN w/ HD    HTN   - continue home meds     Sec [facility]     Type II DM - per Primary team      Interval History:  Seen and examined. Feeling ok. No cp, sob, n/v/d. Pain controlled.  hgb better this AM.    Review of Systems: A comprehensive review of systems was negative except for that written in the HPI.     Current Medications:   Current Facility-Administered Medications   Medication Dose Route Frequency    HYDROcodone-acetaminophen (NORCO)  mg tablet 1 Tab  1 Tab Oral Q4H PRN    morphine injection 2 mg  2 mg IntraVENous Q4H PRN    0.9% sodium chloride infusion 250 mL  250 mL IntraVENous PRN    HYDROcodone-acetaminophen (NORCO) 5-325 mg per tablet 1 Tab  1 Tab Oral Q4H PRN    insulin lispro (HUMALOG) injection 10 Units  10 Units SubCUTAneous TIDAC    insulin glargine (LANTUS) injection 20 Units  20 Units SubCUTAneous DAILY    timolol (TIMOPTIC) 0.25% ophthalmic solution  1 Drop Both Eyes BID    cefepime (MAXIPIME) 1 g in 0.9% sodium chloride (MBP/ADV) 50 mL  1 g IntraVENous Q24H    epoetin dyan-epbx (RETACRIT) injection 20,000 Units  20,000 Units SubCUTAneous Q MON, WED & FRI    clopidogreL (PLAVIX) tablet 75 mg  75 mg Oral DAILY    isosorbide mononitrate ER (IMDUR) tablet 60 mg  60 mg Oral DAILY    latanoprost (XALATAN) 0.005 % ophthalmic solution 1 Drop  1 Drop Both Eyes QHS    atorvastatin (LIPITOR) tablet 20 mg  20 mg Oral DAILY    tamsulosin (FLOMAX) capsule 0.4 mg  0.4 mg Oral QHS    sodium chloride (NS) flush 5-40 mL  5-40 mL IntraVENous PRN    acetaminophen (TYLENOL) tablet 650 mg  650 mg Oral Q4H PRN    heparin (porcine) injection 5,000 Units  5,000 Units SubCUTAneous Q8H    glucose chewable tablet 16 g  4 Tab Oral PRN    glucagon (GLUCAGEN) injection 1 mg  1 mg IntraMUSCular PRN    dextrose 10% infusion 0-250 mL  0-250 mL IntraVENous PRN    Vancomycin- Pharmacy to Dose    Other Rx Dosing/Monitoring    insulin lispro (HUMALOG) injection   SubCUTAneous AC&HS      Allergies   Allergen Reactions    Augmentin [Amoxicillin-Pot Clavulanate] Hives    Penicillins Other (comments)     States skin peeling with penicillin    Zoster Vaccine Live Hives       Objective:  Vitals:    Vitals:    08/18/20 0020 08/18/20 0407 08/18/20 0820 08/18/20 1230   BP: 160/72 (!) 122/93 169/78 124/66   Pulse: 82 88 73 83   Resp: 16 18 18 16   Temp: 98.9 °F (37.2 °C) 99.4 °F (37.4 °C) 99.9 °F (37.7 °C) 98.8 °F (37.1 °C)   SpO2: 95% 96% 96% 99%   Weight:  83 kg (182 lb 15.7 oz)     Height:         Intake and Output:  No intake/output data recorded. 08/16 1901 - 08/18 0700  In: 200 [I.V.:200]  Out: 2520     Physical Examination:    General: NAD,Conversant   Neck:  Supple, no mass  Resp:  Lungs CTA B/L, no wheezing , normal respiratory effort  CV:  RRR,  no murmur or rub,trace LE edema  GI:  Soft, NT, + Bowel sounds, no hepatosplenomegaly  Neurologic:  Non focal  Psych:             AAO x 3 appropriate affect   Ext                  Left BKA. R heel in dressing   Access          LUE AVF + thrill     []    High complexity decision making was performed  []    Patient is at high-risk of decompensation with multiple organ involvement    Lab Data Personally Reviewed: I have reviewed all the pertinent labs, microbiology data and radiology studies during assessment.     Recent Labs     08/18/20  0521 08/17/20  0423 08/16/20  0120    136 136   K 3.6 3.7 3.8    102 101   CO2 25 25 26   * 155* 239*   BUN 35* 53* 41*   CREA 6.76* 9.20* 7.06*   CA 8.9 8.7 8.5   MG  --  1.9  --    PHOS  --  2.0*  --      Recent Labs     08/18/20  0521 08/17/20  0423   WBC 13.0* 12.0*   HGB 9.5* 6.9*   HCT 30.2* 23.0*    380     No results found for: SDES  Lab Results   Component Value Date/Time    Culture result: NO GROWTH 5 DAYS 08/13/2020 12:32 AM    Culture result: NO GROWTH 6 DAYS 08/12/2020 03:55 PM    Culture result: SCANT  MIXED SKIN MAHESH ISOLATED   03/17/2017 03:08 PM    Culture result: NO ANAEROBES ISOLATED 03/17/2017 03:08 PM    Culture result: NO GROWTH 2 DAYS 06/24/2016 10:25 PM     Recent Results (from the past 24 hour(s))   GLUCOSE, POC    Collection Time: 08/17/20  1:10 PM   Result Value Ref Range    Glucose (POC) 186 (H) 65 - 100 mg/dL    Performed by 50 Aleyda Valdivia, POC    Collection Time: 08/17/20 10:11 PM   Result Value Ref Range    Glucose (POC) 90 65 - 100 mg/dL    Performed by 500 Tony Argueta, BASIC    Collection Time: 08/18/20  5:21 AM   Result Value Ref Range    Sodium 138 136 - 145 mmol/L    Potassium 3.6 3.5 - 5.1 mmol/L    Chloride 104 97 - 108 mmol/L    CO2 25 21 - 32 mmol/L    Anion gap 9 5 - 15 mmol/L    Glucose 171 (H) 65 - 100 mg/dL    BUN 35 (H) 6 - 20 MG/DL    Creatinine 6.76 (H) 0.70 - 1.30 MG/DL    BUN/Creatinine ratio 5 (L) 12 - 20      GFR est AA 10 (L) >60 ml/min/1.73m2    GFR est non-AA 8 (L) >60 ml/min/1.73m2    Calcium 8.9 8.5 - 10.1 MG/DL   CBC W/O DIFF    Collection Time: 08/18/20  5:21 AM   Result Value Ref Range    WBC 13.0 (H) 4.1 - 11.1 K/uL    RBC 3.09 (L) 4.10 - 5.70 M/uL    HGB 9.5 (L) 12.1 - 17.0 g/dL    HCT 30.2 (L) 36.6 - 50.3 %    MCV 97.7 80.0 - 99.0 FL    MCH 30.7 26.0 - 34.0 PG    MCHC 31.5 30.0 - 36.5 g/dL    RDW 15.6 (H) 11.5 - 14.5 %    PLATELET 722 770 - 571 K/uL    MPV 9.6 8.9 - 12.9 FL    NRBC 0.4 (H) 0  WBC    ABSOLUTE NRBC 0.05 (H) 0.00 - 0.01 K/uL   GLUCOSE, POC    Collection Time: 08/18/20  8:10 AM   Result Value Ref Range    Glucose (POC) 149 (H) 65 - 100 mg/dL Performed by Clementina Doll, POC    Collection Time: 08/18/20 12:25 PM   Result Value Ref Range    Glucose (POC) 89 65 - 100 mg/dL    Performed by Kendal Good            Total time spent with patient:  xxx   min. Care Plan discussed with:  Patient     Family      RN      Consulting Physician [facility],         I have reviewed the flowsheets. Chart and Pertinent Notes have been reviewed. No change in PMH ,family and social history from Consult note.       Lisha Greenberg MD

## 2020-08-18 NOTE — PROGRESS NOTES
Problem: Self Care Deficits Care Plan (Adult)  Goal: *Acute Goals and Plan of Care (Insert Text)  Description:   FUNCTIONAL STATUS PRIOR TO ADMISSION: patient with left BKA 7/20, now with right BKA, patient was at [facility] and required assist with bathing dressing all transfers and reported having wheelchair, however unknown how often in chair     HOME SUPPORT: from long term care    Occupational Therapy Goals  Initiated 8/18/2020  1. Patient will perform static sitting edge of bed or long sitting > or = 5 minutes with minimal assistance assist within 7 day(s). 2.  Patient will perform bed mobility and scooting to head of bed with minimal assistance/contact guard assist within 7 day(s). 3.  Patient will tolerate > or = 10 minutes functional activity without complaint of pain within 7 day(s). 4.  Patient will verbalize desensitization techniques for BKA and perform x's 5 minutes within 7 day(s). 5.  Patient will perform UE and core strengthening exercises at bed level x's 10 minutes with < or = 2 rest breaks within 7 day(s). Outcome: Not Met    OCCUPATIONAL THERAPY EVALUATION  Patient: Philly Escobar (21 y.o. male)  Date: 8/18/2020  Primary Diagnosis: Sepsis ([facility] [address] [A41.9]  Procedure(s) (LRB):  RIGHT BELOW KNEE AMPUTATION (Right) 1 Day Post-Op   Precautions:   Fall, Skin    ASSESSMENT  Based on the objective data described below, the patient presents with recent left BKA last month and now right BKA. Patient with decreased functional UE and core strength to perform bed mobility and scooting in bed. Will need to return to rehab. Completed grooming supported in bed. Current Level of Function Impacting Discharge (ADLs/self-care): total assist LE ADL's, toileting, max assist UE ADL's, unable to sit edge of bed, max assist to scoot to head of bed    Functional Outcome Measure:   The patient scored 5/100 on the Barthel Index outcome measure   Other factors to consider for discharge: per chart LTC     Patient will benefit from skilled therapy intervention to address the above noted impairments. PLAN :  Recommendations and Planned Interventions: self care training, functional mobility training, therapeutic exercise, balance training, therapeutic activities, endurance activities, patient education, home safety training, and family training/education    Frequency/Duration: Patient will be followed by occupational therapy 3 times a week to address goals. Recommendation for discharge: (in order for the patient to meet his/her long term goals)  Therapy up to 5 days/week in SNF setting    This discharge recommendation:  Has not yet been discussed the attending provider and/or case management    IF patient discharges home will need the following DME: none       SUBJECTIVE:   Patient stated I can't cause this leg is sore.  stated with efforts to push himself to head of bed    OBJECTIVE DATA SUMMARY:   HISTORY:   Past Medical History:   Diagnosis Date    CAD (coronary artery disease)      2014, MI, CABG    Chronic kidney disease     on dialysis MWF    Diabetes ([facility] [address]     ESRD (end stage renal disease) ([facility] [address]     Glaucoma     Heart failure ([facility] [address]     chronic diastolic HF per cardiology note    Hyperlipidemia     Hypertension     Ill-defined condition     overweight BMI 27.2    PAD (peripheral artery disease) ([facility] [address]     Stroke [facility]) August 2011    residual left side weakness stroke x 2 per cardiology note     Past Surgical History:   Procedure Laterality Date    CARDIAC SURG PROCEDURE UNLIST      cavbg 2014 x3 VESSELS    HX CHOLECYSTECTOMY  02/2020    HX CORONARY ARTERY BYPASS GRAFT  February 2014    HX HEART CATHETERIZATION  01/24/2014    HX OTHER SURGICAL Left 06/2016    last toe on left foot amputated, for nonhealing toe ulcer    HX VASCULAR ACCESS Right Brenden Catheter    removed when fistula healed    VASCULAR SURGERY PROCEDURE UNLIST      fistula left arm       Expanded or extensive additional review of patient history: Home Situation  Home Environment: [address] [place] [address] Name: Kasi  One/Two Story Residence: One story  Living Alone: Yes  Support Systems: Family member(s)  Patient Expects to be Discharged to[de-identified] Skilled nursing facility  Current DME Used/Available at Home: Wheelchair    Hand dominance: Right    EXAMINATION OF PERFORMANCE DEFICITS:  Cognitive/Behavioral Status:  Neurologic State: Alert  Orientation Level: Oriented X4  Cognition: Decreased command following  Perception: Appears intact  Perseveration: No perseveration noted  Safety/Judgement: Awareness of environment; Fall prevention; Insight into deficits    Skin: Left BKA healing, stitches removed, right BKA with knee immobilizer in place    Edema: none noted    Hearing: Auditory  Auditory Impairment: None    Vision/Perceptual:                                Corrective Lenses: Glasses    Range of Motion:  AROM: Generally decreased, functional  PROM: Generally decreased, functional                      Strength:  Strength: Grossly decreased, non-functional                Coordination:  Coordination: Generally decreased, functional  Fine Motor Skills-Upper: Left Intact; Right Intact    Gross Motor Skills-Upper: Left Intact; Right Intact      Balance:  Sitting: Impaired; With support  Sitting - Static: Poor (constant support)  Sitting - Dynamic: Poor (constant support)    Functional Mobility and Transfers for ADLs:  Bed Mobility:  Rolling: Moderate assistance  Supine to Sit: Maximum assistance; Additional time  Sit to Supine: Maximum assistance; Additional time  Scooting: Total assistance    Transfers:  Sit to Stand: Other (comment)(n/a)  Bed to Chair: Total assistance(aurelia)  Bathroom Mobility: Dependent/total assistance  Toilet Transfer : Total assistance    ADL Assessment:  Feeding: Setup;Modified independent    Oral Facial Hygiene/Grooming: Setup;Supervision    Bathing: Maximum assistance; Other (comment)(bed level)    Upper Body Dressing: Moderate assistance    Lower Body Dressing: Total assistance    Toileting: Total assistance;Maximum assistance                ADL Intervention and task modifications:   Educated on role of OT, use of call bell, benefit of increased activity, positioning of LE's in extension and locked foot of bed to prevent prolonged knee flexion, instructed on positioning of bed in supine/flat to increase ability to use UE's to push self to head of bed, required max assist to total assist and verbalized pain in right LE     Instructed to perform grooming tasks daily at bed level and participate in ADLs as able    Cognitive Retraining  Safety/Judgement: Awareness of environment; Fall prevention; Insight into deficits       Functional Measure:  Barthel Index:    Bathin  Bladder: 0  Bowels: 0  Groomin  Dressin  Feedin  Mobility: 0  Stairs: 0  Toilet Use: 0  Transfer (Bed to Chair and Back): 0  Total: 5/100        The Barthel ADL Index: Guidelines  1. The index should be used as a record of what a patient does, not as a record of what a patient could do. 2. The main aim is to establish degree of independence from any help, physical or verbal, however minor and for whatever reason. 3. The need for supervision renders the patient not independent. 4. A patient's performance should be established using the best available evidence. Asking the patient, friends/relatives and nurses are the usual sources, but direct observation and common sense are also important. However direct testing is not needed. 5. Usually the patient's performance over the preceding 24-48 hours is important, but occasionally longer periods will be relevant. 6. Middle categories imply that the patient supplies over 50 per cent of the effort. 7. Use of aids to be independent is allowed. Nate Underwood., Barthel, D.W. (1493). Functional evaluation: the Barthel Index. [address] [facility] (14)2.   Laurent Hodgkins [place], J.J.M.F, Nicole Nicolas., Jacqueline Pereyra., Ayesha, EZE. (1999). Measuring the change indisability after inpatient rehabilitation; comparison of the responsiveness of the Barthel Index and Functional [place] Measure. Journal of Neurology, Neurosurgery, and Psychiatry, 66(4), 713-031. RANJANA Henry, MADDI Lu, & Emmett Liam M.A. (2004.) Assessment of post-stroke quality of life in cost-effectiveness studies: The usefulness of the Barthel Index and the EuroQoL-5D. Quality of Life Research, 15, 476-31         Occupational Therapy Evaluation Charge Determination   History Examination Decision-Making   LOW Complexity : Brief history review  MEDIUM Complexity : 3-5 performance deficits relating to physical, cognitive , or psychosocial skils that result in activity limitations and / or participation restrictions HIGH Complexity : Patient presents with comorbidities that affect occupational performance. Signifigant modification of tasks or assistance (eg, physical or verbal) with assessment (s) is necessary to enable patient to complete evaluation       Based on the above components, the patient evaluation is determined to be of the following complexity level: LOW   Pain Rating:  Not rated, right LE    Activity Tolerance:   Fair  Please refer to the flowsheet for vital signs taken during this treatment. After treatment patient left in no apparent distress:    Supine in bed, Call bell within reach, Bed / chair alarm activated, and Side rails x 3    COMMUNICATION/EDUCATION:   The patients plan of care was discussed with: Physical therapist and Registered nurse. Home safety education was provided and the patient/caregiver indicated understanding. and Patient/family have participated as able in goal setting and plan of care. This patients plan of care is appropriate for delegation to [facility].     Thank you for this referral.  Haroon Patterson OTR/L  Time Calculation: 22 mins

## 2020-08-18 NOTE — PROGRESS NOTES
8/18/2020 -   JJ:  - RUR: 20%  - Disposition is for return to [facility]  - Patient will need COVID test prior to discharge  - Patient will need BLS transport  - Patient to continue HD with MWF schedule at [facility]  - Patient received 2nd IM letter 8/18  - Patient may be ready for discharge 8/19    - Patient is POD#1 from right BKA  - ABX continue    10:00 -   CM attempted to [address] ([phone]) and left message for Gaye to return call to .   CRM: Balbir Bradford, MPH, [address] [place] [address]; Z: [phone]

## 2020-08-19 LAB
ERYTHROCYTE [DISTWIDTH] IN BLOOD BY AUTOMATED COUNT: 16.4 % (ref 11.5–14.5)
GLUCOSE BLD STRIP.AUTO-MCNC: 132 MG/DL (ref 65–100)
GLUCOSE BLD STRIP.AUTO-MCNC: 205 MG/DL (ref 65–100)
GLUCOSE BLD STRIP.AUTO-MCNC: 89 MG/DL (ref 65–100)
HCT VFR BLD AUTO: 30.3 % (ref 36.6–50.3)
HGB BLD-MCNC: 9.6 G/DL (ref 12.1–17)
MCH RBC QN AUTO: 30.9 PG (ref 26–34)
MCHC RBC AUTO-ENTMCNC: 31.7 G/DL (ref 30–36.5)
MCV RBC AUTO: 97.4 FL (ref 80–99)
NRBC # BLD: 0 K/UL (ref 0–0.01)
NRBC BLD-RTO: 0 PER 100 WBC
PLATELET # BLD AUTO: 331 K/UL (ref 150–400)
PMV BLD AUTO: 9.5 FL (ref 8.9–12.9)
RBC # BLD AUTO: 3.11 M/UL (ref 4.1–5.7)
SERVICE CMNT-IMP: ABNORMAL
SERVICE CMNT-IMP: ABNORMAL
SERVICE CMNT-IMP: NORMAL
WBC # BLD AUTO: 12.3 K/UL (ref 4.1–11.1)

## 2020-08-19 PROCEDURE — 74011250636 HC RX REV CODE- 250/636

## 2020-08-19 PROCEDURE — 65270000029 HC RM PRIVATE

## 2020-08-19 PROCEDURE — 74011250637 HC RX REV CODE- 250/637: Performed by: NURSE PRACTITIONER

## 2020-08-19 PROCEDURE — 82962 GLUCOSE BLOOD TEST: CPT

## 2020-08-19 PROCEDURE — 85027 COMPLETE CBC AUTOMATED: CPT

## 2020-08-19 PROCEDURE — 87635 SARS-COV-2 COVID-19 AMP PRB: CPT

## 2020-08-19 PROCEDURE — 74011636637 HC RX REV CODE- 636/637

## 2020-08-19 PROCEDURE — 74011000258 HC RX REV CODE- 258

## 2020-08-19 PROCEDURE — 36415 COLL VENOUS BLD VENIPUNCTURE: CPT

## 2020-08-19 PROCEDURE — 90935 HEMODIALYSIS ONE EVALUATION: CPT

## 2020-08-19 PROCEDURE — 74011250637 HC RX REV CODE- 250/637

## 2020-08-19 PROCEDURE — 94760 N-INVAS EAR/PLS OXIMETRY 1: CPT

## 2020-08-19 RX ORDER — DIPHENHYDRAMINE HYDROCHLORIDE 50 MG/ML
25 INJECTION, SOLUTION INTRAMUSCULAR; INTRAVENOUS
Status: DISCONTINUED | OUTPATIENT
Start: 2020-08-19 | End: 2020-08-22 | Stop reason: HOSPADM

## 2020-08-19 RX ADMIN — HYDROCODONE BITARTRATE AND ACETAMINOPHEN 1 TABLET: 5; 325 TABLET ORAL at 12:51

## 2020-08-19 RX ADMIN — ISOSORBIDE MONONITRATE 60 MG: 30 TABLET ORAL at 09:26

## 2020-08-19 RX ADMIN — INSULIN LISPRO 10 UNITS: 100 INJECTION, SOLUTION INTRAVENOUS; SUBCUTANEOUS at 12:44

## 2020-08-19 RX ADMIN — ATORVASTATIN CALCIUM 20 MG: 20 TABLET, FILM COATED ORAL at 09:26

## 2020-08-19 RX ADMIN — VANCOMYCIN HYDROCHLORIDE 750 MG: 750 INJECTION, POWDER, LYOPHILIZED, FOR SOLUTION INTRAVENOUS at 20:51

## 2020-08-19 RX ADMIN — TIMOLOL MALEATE 1 DROP: 2.5 SOLUTION/ DROPS OPHTHALMIC at 11:10

## 2020-08-19 RX ADMIN — HYDROCODONE BITARTRATE AND ACETAMINOPHEN 1 TABLET: 5; 325 TABLET ORAL at 03:37

## 2020-08-19 RX ADMIN — HEPARIN SODIUM 5000 UNITS: 5000 INJECTION INTRAVENOUS; SUBCUTANEOUS at 04:38

## 2020-08-19 RX ADMIN — HEPARIN SODIUM 5000 UNITS: 5000 INJECTION INTRAVENOUS; SUBCUTANEOUS at 12:41

## 2020-08-19 RX ADMIN — INSULIN LISPRO 2 UNITS: 100 INJECTION, SOLUTION INTRAVENOUS; SUBCUTANEOUS at 12:43

## 2020-08-19 RX ADMIN — LATANOPROST 1 DROP: 50 SOLUTION OPHTHALMIC at 20:57

## 2020-08-19 RX ADMIN — TAMSULOSIN HYDROCHLORIDE 0.4 MG: 0.4 CAPSULE ORAL at 20:51

## 2020-08-19 RX ADMIN — CEFEPIME HYDROCHLORIDE 1 G: 1 INJECTION, POWDER, FOR SOLUTION INTRAMUSCULAR; INTRAVENOUS at 19:36

## 2020-08-19 RX ADMIN — HEPARIN SODIUM 5000 UNITS: 5000 INJECTION INTRAVENOUS; SUBCUTANEOUS at 20:51

## 2020-08-19 RX ADMIN — INSULIN GLARGINE 20 UNITS: 100 INJECTION, SOLUTION SUBCUTANEOUS at 09:27

## 2020-08-19 RX ADMIN — CLOPIDOGREL BISULFATE 75 MG: 75 TABLET ORAL at 09:26

## 2020-08-19 RX ADMIN — EPOETIN ALFA-EPBX 20000 UNITS: 10000 INJECTION, SOLUTION INTRAVENOUS; SUBCUTANEOUS at 20:51

## 2020-08-19 NOTE — ROUTINE PROCESS
Bedside shift change report given to Hampton Osler (oncoming nurse) by Tuan Choi (offgoing nurse). Report included the following information SBAR.

## 2020-08-19 NOTE — PROGRESS NOTES
JJ: Return to [place] [address] when medically stable. Patient will need COVID testing prior to discharge (test ordered today) Patient to continue HD with MWF schedule at [facility]. Patient will need BLS transport at discharge. CM received call from [place] at the [address] [place] [address]. CM gave update. They can accept patient back into LTC when medically stable.      SHERRY Chawla/CRM

## 2020-08-19 NOTE — PROGRESS NOTES
Problem: Heart Failure: Day 5  Goal: Activity/Safety  Outcome: Progressing Towards Goal  Goal: Nutrition/Diet  Outcome: Progressing Towards Goal  Goal: Medications  Outcome: Progressing Towards Goal  Goal: Respiratory  Outcome: Progressing Towards Goal

## 2020-08-19 NOTE — PROGRESS NOTES
[facility]   [address] [facility], [facility] [address], [facility]  Phone: [phone]   [phone]       Nephrology Progress Note  Addis Torres     1955     [id number]  Date of Admission : 8/12/2020 08/19/20    CC: Follow up for ESRD     Assessment and Plan   ESRD- HD   - dialyzes MWF @ [address] . F/B Dr Abimael Mccracken  - HD for later today    PVD   - s/p Left BKA 7/20   - R BKA 8/17    Anemia in CKD:  - transfused 2 units on 8/17  - ARSALAN w/ HD    HTN   - continue home meds     Sec [facility]     Type II DM - per Primary team      Interval History:  Seen and examined. Feeling ok. Resting in bed. No cp, sob, n/v/d. Pain controlled. Review of Systems: A comprehensive review of systems was negative except for that written in the HPI.     Current Medications:   Current Facility-Administered Medications   Medication Dose Route Frequency    HYDROcodone-acetaminophen (NORCO)  mg tablet 1 Tab  1 Tab Oral Q4H PRN    morphine injection 2 mg  2 mg IntraVENous Q4H PRN    0.9% sodium chloride infusion 250 mL  250 mL IntraVENous PRN    HYDROcodone-acetaminophen (NORCO) 5-325 mg per tablet 1 Tab  1 Tab Oral Q4H PRN    insulin lispro (HUMALOG) injection 10 Units  10 Units SubCUTAneous TIDAC    insulin glargine (LANTUS) injection 20 Units  20 Units SubCUTAneous DAILY    timolol (TIMOPTIC) 0.25% ophthalmic solution  1 Drop Both Eyes BID    cefepime (MAXIPIME) 1 g in 0.9% sodium chloride (MBP/ADV) 50 mL  1 g IntraVENous Q24H    epoetin dyan-epbx (RETACRIT) injection 20,000 Units  20,000 Units SubCUTAneous Q MON, WED & FRI    clopidogreL (PLAVIX) tablet 75 mg  75 mg Oral DAILY    isosorbide mononitrate ER (IMDUR) tablet 60 mg  60 mg Oral DAILY    latanoprost (XALATAN) 0.005 % ophthalmic solution 1 Drop  1 Drop Both Eyes QHS    atorvastatin (LIPITOR) tablet 20 mg  20 mg Oral DAILY    tamsulosin (FLOMAX) capsule 0.4 mg  0.4 mg Oral QHS    sodium chloride (NS) flush 5-40 mL  5-40 mL IntraVENous PRN    acetaminophen (TYLENOL) tablet 650 mg  650 mg Oral Q4H PRN    heparin (porcine) injection 5,000 Units  5,000 Units SubCUTAneous Q8H    glucose chewable tablet 16 g  4 Tab Oral PRN    glucagon (GLUCAGEN) injection 1 mg  1 mg IntraMUSCular PRN    dextrose 10% infusion 0-250 mL  0-250 mL IntraVENous PRN    Vancomycin- Pharmacy to Dose    Other Rx Dosing/Monitoring    insulin lispro (HUMALOG) injection   SubCUTAneous AC&HS      Allergies   Allergen Reactions    Augmentin [Amoxicillin-Pot Clavulanate] Hives    Penicillins Other (comments)     States skin peeling with penicillin    Zoster Vaccine Live Hives       Objective:  Vitals:    Vitals:    08/19/20 0309 08/19/20 0506 08/19/20 0553 08/19/20 0815   BP: 144/77   163/81   Pulse: 84   84   Resp: 18   18   Temp: 97.7 °F (36.5 °C)   98.7 °F (37.1 °C)   SpO2: 99%  98% 98%   Weight:  85.2 kg (187 lb 13.3 oz)     Height:         Intake and Output:  No intake/output data recorded. 08/17 1901 - 08/19 0700  In: 5 [P.O.:720]  Out: 2500     Physical Examination:    General: NAD,Conversant   Neck:  Supple, no mass  Resp:  Lungs CTA B/L, no wheezing , normal respiratory effort  CV:  RRR,  no murmur or rub,trace LE edema  GI:  Soft, NT, + Bowel sounds, no hepatosplenomegaly  Neurologic:  Non focal  Psych:             AAO x 3 appropriate affect   Ext                  Left BKA. R heel in dressing   Access          LUE AVF + thrill     []    High complexity decision making was performed  []    Patient is at high-risk of decompensation with multiple organ involvement    Lab Data Personally Reviewed: I have reviewed all the pertinent labs, microbiology data and radiology studies during assessment.     Recent Labs     08/18/20 0521 08/17/20  0423    136   K 3.6 3.7    102   CO2 25 25   * 155*   BUN 35* 53*   CREA 6.76* 9.20*   CA 8.9 8.7   MG  --  1.9   PHOS  --  2.0*     Recent Labs     08/19/20  0447 08/18/20  0521 08/17/20  0423   WBC 12.3* 13.0* 12.0*   HGB 9.6* 9.5* 6.9*   HCT 30.3* 30.2* 23.0*    312 380     No results found for: SDES  Lab Results   Component Value Date/Time    Culture result: NO GROWTH 5 DAYS 08/13/2020 12:32 AM    Culture result: NO GROWTH 6 DAYS 08/12/2020 03:55 PM    Culture result: SCANT  MIXED SKIN MAHESH ISOLATED   03/17/2017 03:08 PM    Culture result: NO ANAEROBES ISOLATED 03/17/2017 03:08 PM    Culture result: NO GROWTH 2 DAYS 06/24/2016 10:25 PM     Recent Results (from the past 24 hour(s))   GLUCOSE, POC    Collection Time: 08/18/20 12:25 PM   Result Value Ref Range    Glucose (POC) 89 65 - 100 mg/dL    Performed by [address] [place] [address] AB    Collection Time: 08/18/20 12:35 PM   Result Value Ref Range    Hepatitis B surface Ab >1,000.00 mIU/mL    Hep B surface Ab Interp. REACTIVE (A) NR     GLUCOSE, POC    Collection Time: 08/18/20  4:42 PM   Result Value Ref Range    Glucose (POC) 135 (H) 65 - 100 mg/dL    Performed by [address] [place] [address], POC    Collection Time: 08/18/20  9:08 PM   Result Value Ref Range    Glucose (POC) 164 (H) 65 - 100 mg/dL    Performed by Chestine Jewels    CBC W/O DIFF    Collection Time: 08/19/20  4:47 AM   Result Value Ref Range    WBC 12.3 (H) 4.1 - 11.1 K/uL    RBC 3.11 (L) 4.10 - 5.70 M/uL    HGB 9.6 (L) 12.1 - 17.0 g/dL    HCT 30.3 (L) 36.6 - 50.3 %    MCV 97.4 80.0 - 99.0 FL    MCH 30.9 26.0 - 34.0 PG    MCHC 31.7 30.0 - 36.5 g/dL    RDW 16.4 (H) 11.5 - 14.5 %    PLATELET 976 560 - 284 K/uL    MPV 9.5 8.9 - 12.9 FL    NRBC 0.0 0  WBC    ABSOLUTE NRBC 0.00 0.00 - 0.01 K/uL   GLUCOSE, POC    Collection Time: 08/19/20  6:11 AM   Result Value Ref Range    Glucose (POC) 132 (H) 65 - 100 mg/dL    Performed by Chandan Carlos    GLUCOSE, POC    Collection Time: 08/19/20 11:37 AM   Result Value Ref Range    Glucose (POC) 205 (H) 65 - 100 mg/dL    Performed by Kavya Kovacs            Total time spent with patient:  xxx   min.                                Care Plan discussed with:  Patient     Family      RN      Consulting Physician [facility]        I have reviewed the flowsheets. Chart and Pertinent Notes have been reviewed. No change in PMH ,family and social history from Consult note.       Leonardo Cueto MD

## 2020-08-19 NOTE — DIALYSIS
[facility] Team [facility]  [phone]    Vitals   Pre   Post   Assessment   Pre   Post     Temp  97.7  98.9 LOC  axox4 axox4   HR   83 88 Lungs   clear  clear   B/P   126/52 155/62 Cardiac   nsr  nsr   Resp   18 18 Skin   Warm,dry, intact  warm,dry,intact   Pain level  0 0 Edema  generalized     generalized   Orders:    Duration:   Start:    1500 End:    1800 Total:   3.0   Dialyzer:   Dialyzer/Set Up Inspection: Revaclear (08/19/20 1410)   K Bath:   Dialysate K (mEq/L): 2 (08/19/20 1410)   Ca Bath:   Dialysate CA (mEq/L): 2.5 (08/19/20 1410)   Na/Bicarb:   Dialysate NA (mEq/L): 140 (08/19/20 1410)   Target Fluid Removal:   Goal/Amount of Fluid to Remove (mL): 2500 mL (08/19/20 1410)   Access     Type & Location:   HARSHA AVF: skin CDI. No s/s of infection. No issues with cannulation or hemostasis.  High A/V pressure requires lower BFR of 400       Labs     Obtained/Reviewed   Critical Results Called   Date when labs were drawn-  Hgb-    HGB   Date Value Ref Range Status   08/19/2020 9.6 (L) 12.1 - 17.0 g/dL Final     K-    Potassium   Date Value Ref Range Status   08/18/2020 3.6 3.5 - 5.1 mmol/L Final     Ca-   Calcium   Date Value Ref Range Status   08/18/2020 8.9 8.5 - 10.1 MG/DL Final     Bun-   BUN   Date Value Ref Range Status   08/18/2020 35 (H) 6 - 20 MG/DL Final     Comment:     INVESTIGATED PER DELTA CHECK PROTOCOL     Creat-   Creatinine   Date Value Ref Range Status   08/18/2020 6.76 (H) 0.70 - 1.30 MG/DL Final     Comment:     INVESTIGATED PER DELTA CHECK PROTOCOL        Medications/ Blood Products Given     Name   Dose   Route and Time     None ordered                Blood Volume Processed (BVP):    63 Net Fluid   Removed:  1950   Comments   Time Out Done: 1430  Primary Nurse Rpt Pre: Mehdi Villeda RN  Primary Nurse Rpt Cornelia Frazier RN  Pt Education:access use, and Rx./ symptoms and treatments  Care Plan: rerturn to room, continue tx  Per md.   Tx Summary: Pt arrived to HD [address]. Consent signed & on file. SBAR received from Primary RN. 1445: Pt cannulated with 33W needles per policy & without issue. VSS. 1500 Dialysis Tx initiated. Pt A/V pressures were high, and required a BFR of 400 over 450, to run at a safe level. Pt had little problems during tx, until the last 45 minutes.  spikes revealed possible clots in line, but TMP did not support. Many interventions performed, pump finally lowered to 250 to keep blood circulating. Dr. David Cheatham. Spoke to , and was given  ok to end treatment early. 1800: Tx ended. VSS. All possible blood returned to patient. Many clots visible in line. Hemostasis achieved without issue. Dressing CDI. SBAR given to Primary, RN. Pt. Returned to room. All Dialysis related medications have been reviewed.      Admiting Diagnosis:  Pt's previous clinic-  Consent signed - Informed Consent Verified: Yes (08/19/20 1410)  [facility] Consent -on file   Hepatitis Status- 08/18/20 immune  Machine #- Machine Number: [id number] (08/19/20 1410)  Telemetry status-bedside

## 2020-08-19 NOTE — PROGRESS NOTES
Per Jesus Landeros RN patient is still itching. This RNwill pull the benadryl that Dr. Tin Hoyt ordered and this RN or Mireya Lance RN will take to 5W and administer to patient      1724-Per dialysis nurse Jesus Landeros RN he will give benadryl to patient      1816-Per Jesus Landeros RN patients lines clotted so he could not give benadryl. Per Shadi [place] was taken off and lines clotted with 40 minutes left in the treatment.   Per Jesus Landeros MD aware

## 2020-08-19 NOTE — PROGRESS NOTES
Problem: Falls - Risk of  Goal: *Absence of Falls  Description: Document Sue Kendall Fall Risk and appropriate interventions in the flowsheet.   Outcome: Progressing Towards Goal  Note: Fall Risk Interventions:  Mobility Interventions: Patient to call before getting OOB, Utilize walker, cane, or other assistive device, Communicate number of staff needed for ambulation/transfer, Bed/chair exit alarm    Mentation Interventions: Adequate sleep, hydration, pain control, Bed/chair exit alarm, Door open when patient unattended, More frequent rounding, Reorient patient    Medication Interventions: Evaluate medications/consider consulting pharmacy, Patient to call before getting OOB, Teach patient to arise slowly, Bed/chair exit alarm    Elimination Interventions: Call light in reach, Patient to call for help with toileting needs

## 2020-08-19 NOTE — PROGRESS NOTES
2100 Primary Nurse Lita Wilcox and Ita Diana RN performed a dual skin assessment on this patient Impairment noted- see wound doc flow sheet  Jose score is 14  Patient has bilateral BKAs      Bedside and Verbal shift change report given to Ita Diana RN (oncoming nurse) by Gisela Mendez RN (offgoing nurse). Report included the following information SBAR.

## 2020-08-20 LAB
GLUCOSE BLD STRIP.AUTO-MCNC: 116 MG/DL (ref 65–100)
GLUCOSE BLD STRIP.AUTO-MCNC: 154 MG/DL (ref 65–100)
GLUCOSE BLD STRIP.AUTO-MCNC: 230 MG/DL (ref 65–100)
GLUCOSE BLD STRIP.AUTO-MCNC: 88 MG/DL (ref 65–100)
SERVICE CMNT-IMP: ABNORMAL
SERVICE CMNT-IMP: NORMAL

## 2020-08-20 PROCEDURE — 65270000029 HC RM PRIVATE

## 2020-08-20 PROCEDURE — 74011636637 HC RX REV CODE- 636/637

## 2020-08-20 PROCEDURE — 94760 N-INVAS EAR/PLS OXIMETRY 1: CPT

## 2020-08-20 PROCEDURE — 74011250636 HC RX REV CODE- 250/636

## 2020-08-20 PROCEDURE — 82962 GLUCOSE BLOOD TEST: CPT

## 2020-08-20 PROCEDURE — 74011250637 HC RX REV CODE- 250/637

## 2020-08-20 RX ADMIN — ISOSORBIDE MONONITRATE 60 MG: 30 TABLET ORAL at 09:48

## 2020-08-20 RX ADMIN — LATANOPROST 1 DROP: 50 SOLUTION OPHTHALMIC at 22:00

## 2020-08-20 RX ADMIN — HEPARIN SODIUM 5000 UNITS: 5000 INJECTION INTRAVENOUS; SUBCUTANEOUS at 04:59

## 2020-08-20 RX ADMIN — ATORVASTATIN CALCIUM 20 MG: 20 TABLET, FILM COATED ORAL at 09:48

## 2020-08-20 RX ADMIN — CLOPIDOGREL BISULFATE 75 MG: 75 TABLET ORAL at 09:48

## 2020-08-20 RX ADMIN — HEPARIN SODIUM 5000 UNITS: 5000 INJECTION INTRAVENOUS; SUBCUTANEOUS at 21:50

## 2020-08-20 RX ADMIN — Medication 10 ML: at 15:35

## 2020-08-20 RX ADMIN — TAMSULOSIN HYDROCHLORIDE 0.4 MG: 0.4 CAPSULE ORAL at 21:51

## 2020-08-20 RX ADMIN — Medication 10 ML: at 22:00

## 2020-08-20 RX ADMIN — HEPARIN SODIUM 5000 UNITS: 5000 INJECTION INTRAVENOUS; SUBCUTANEOUS at 13:08

## 2020-08-20 RX ADMIN — TIMOLOL MALEATE 1 DROP: 2.5 SOLUTION/ DROPS OPHTHALMIC at 17:31

## 2020-08-20 RX ADMIN — TIMOLOL MALEATE 1 DROP: 2.5 SOLUTION/ DROPS OPHTHALMIC at 09:48

## 2020-08-20 RX ADMIN — INSULIN LISPRO 1 UNITS: 100 INJECTION, SOLUTION INTRAVENOUS; SUBCUTANEOUS at 21:51

## 2020-08-20 NOTE — PROGRESS NOTES
Vascular:    Left BKA incision looks good. OK with me to go to SNF when otherwise ready. Follow up with me 3 weeks post op.

## 2020-08-20 NOTE — PROGRESS NOTES
Bedside and Verbal shift change report given to Manuela Pimentel RN (oncoming nurse) by Anayeli Brunner RN (offgoing nurse). Report included the following information SBAR.

## 2020-08-20 NOTE — PROGRESS NOTES
Paged Dr. Jeison Randle regarding patient's 0937 blood sugar of 154. Inquiring if 10 units of Humalog and sliding scale dose of Humalog should be held due to patient not eating.    1655 Per Dr. Jeison Randle, hold 1630 Humalog due to above reasons.

## 2020-08-20 NOTE — PROGRESS NOTES
Per Dr. Astrid Garcia, hold 0900 Lantus due to BS of 88 at 0608. Per Dr. Astrid Garcia, will reassess at lunch time.

## 2020-08-20 NOTE — PROGRESS NOTES
Spiritual Care Assessment/Progress Note  [facility]      NAME: Andriy Chen      MRN: [id number]  AGE: 75 y.o.  SEX: male  Taoist Affiliation: Adventism   Language: English     8/20/2020     Total Time (in minutes): 10     Spiritual Assessment begun in [address] [address] through conversation with:         [x]Patient        [] Family    [] Friend(s)        Reason for Consult: Initial/Spiritual assessment, patient floor     Spiritual beliefs: (Please include comment if needed)     [x] Identifies with a pauly tradition: Adventism        [] Supported by a pauly community:            [] Claims no spiritual orientation:           [] Seeking spiritual identity:                [] Adheres to an individual form of spirituality:           [] Not able to assess:                           Identified resources for coping:      [] Prayer                               [] Music                  [] Guided Imagery     [x] Family/friends                 [] Pet visits     [] Devotional reading                         [] Unknown     [] Other:                                               Interventions offered during this visit: (See comments for more details)    Patient Interventions: Affirmation of emotions/emotional suffering, Affirmation of pauly, Coping skills reviewed/reinforced, Iconic (affirming the presence of God/Higher Power)           Plan of Care:     [] Support spiritual and/or cultural needs    [] Support AMD and/or advance care planning process      [] Support grieving process   [] Coordinate Rites and/or Rituals    [] Coordination with community clergy   [] No spiritual needs identified at this time   [] Detailed Plan of Care below (See Comments)  [] Make referral to Music Therapy  [] Make referral to Pet Therapy     [] Make referral to Addiction services  [] Make referral to [facility]  [] Make referral to Spiritual Care Partner  [] No future visits requested        [x] Follow up visits as needed     Comments:  visit for initial spiritual assessment. Patient reclining in bed, resting. Awakened to knock at door and verbal greeting. Much eye contact, calm, appears a little confused at times, but also just woke up. Says he is doing okay. Provided spiritual presence and listening as he spoke briefly about his present thoughts, feelings, and concerns. Did not wish to speak much about his health or the loss of his leg during this admission saying he is doing alright and not in need of anything at this time. Informed him of availability of  and pastoral care services. He appeared encouraged as a result of this visit and expressed gratitude for this visit. Visited by Rev. Evan Graham MDiv, [facility], [facility]   paging service: [phone])

## 2020-08-20 NOTE — PROGRESS NOTES
Problem: Diabetes Self-Management  Goal: *Disease process and treatment process  Description: Define diabetes and identify own type of diabetes; list 3 options for treating diabetes. Outcome: Progressing Towards Goal  Goal: *Developing strategies to promote health/change behavior  Description: Define the ABC's of diabetes; identify appropriate screenings, schedule and personal plan for screenings. Outcome: Progressing Towards Goal  Goal: *Monitoring blood glucose, interpreting and using results  Description: Identify recommended blood glucose targets  and personal targets. Outcome: Progressing Towards Goal     Problem: Falls - Risk of  Goal: *Absence of Falls  Description: Document Richie Cardona Fall Risk and appropriate interventions in the flowsheet. Outcome: Progressing Towards Goal  Note: Fall Risk Interventions:  Mobility Interventions: Communicate number of staff needed for ambulation/transfer, Strengthening exercises (ROM-active/passive), Patient to call before getting OOB    Mentation Interventions: Adequate sleep, hydration, pain control, Update white board    Medication Interventions: Evaluate medications/consider consulting pharmacy    Elimination Interventions: Call light in reach              Problem: Pressure Injury - Risk of  Goal: *Prevention of pressure injury  Description: Document Jose Scale and appropriate interventions in the flowsheet. Outcome: Progressing Towards Goal  Note: Pressure Injury Interventions:  Sensory Interventions: Assess changes in LOC, Keep linens dry and wrinkle-free, Minimize linen layers, Turn and reposition approx. every two hours (pillows and wedges if needed)    Moisture Interventions: Absorbent underpads, Minimize layers    Activity Interventions: Pressure redistribution bed/mattress(bed type)    Mobility Interventions: HOB 30 degrees or less, PT/OT evaluation, Turn and reposition approx.  every two hours(pillow and wedges)    Nutrition Interventions: Offer support with meals,snacks and hydration    Friction and Shear Interventions: HOB 30 degrees or less, Lift team/patient mobility team, Transferring/repositioning devices                Problem: Patient Education: Go to Patient Education Activity  Goal: Patient/Family Education  Outcome: Progressing Towards Goal     Problem: Heart Failure: Day 5  Goal: Activity/Safety  Outcome: Progressing Towards Goal  Goal: Nutrition/Diet  Outcome: Progressing Towards Goal

## 2020-08-20 NOTE — PROGRESS NOTES
[facility]   [address] [facility], [facility] [address], [facility]  Phone: [phone]   [phone]       Nephrology Progress Note  Mahad Oconnell     1955     [id number]  Date of Admission : 8/12/2020 08/20/20    CC: Follow up for ESRD     Assessment and Plan   ESRD- HD   - dialyzes MWF @ [address] . F/B Dr Lauren Michelle  - HD for tomorrow if here    PVD   - s/p Left BKA 7/20   - R BKA 8/17    Anemia in CKD:  - transfused 2 units on 8/17  - ARSALAN w/ HD    HTN   - continue home meds     Sec [facility]     Type II DM - per Primary team      Interval History:  Seen and examined. Feeling ok. Resting in bed. No cp, sob, n/v/d. Pain controlled. Awaiting d/c home. Review of Systems: A comprehensive review of systems was negative except for that written in the HPI.     Current Medications:   Current Facility-Administered Medications   Medication Dose Route Frequency    diphenhydrAMINE (BENADRYL) injection 25 mg  25 mg IntraVENous Q6H PRN    HYDROcodone-acetaminophen (NORCO)  mg tablet 1 Tab  1 Tab Oral Q4H PRN    morphine injection 2 mg  2 mg IntraVENous Q4H PRN    0.9% sodium chloride infusion 250 mL  250 mL IntraVENous PRN    HYDROcodone-acetaminophen (NORCO) 5-325 mg per tablet 1 Tab  1 Tab Oral Q4H PRN    insulin lispro (HUMALOG) injection 10 Units  10 Units SubCUTAneous TIDAC    insulin glargine (LANTUS) injection 20 Units  20 Units SubCUTAneous DAILY    timolol (TIMOPTIC) 0.25% ophthalmic solution  1 Drop Both Eyes BID    epoetin dyan-epbx (RETACRIT) injection 20,000 Units  20,000 Units SubCUTAneous Q MON, WED & FRI    clopidogreL (PLAVIX) tablet 75 mg  75 mg Oral DAILY    isosorbide mononitrate ER (IMDUR) tablet 60 mg  60 mg Oral DAILY    latanoprost (XALATAN) 0.005 % ophthalmic solution 1 Drop  1 Drop Both Eyes QHS    atorvastatin (LIPITOR) tablet 20 mg  20 mg Oral DAILY    tamsulosin (FLOMAX) capsule 0.4 mg  0.4 mg Oral QHS    sodium chloride (NS) flush 5-40 mL  5-40 mL IntraVENous PRN    acetaminophen (TYLENOL) tablet 650 mg  650 mg Oral Q4H PRN    heparin (porcine) injection 5,000 Units  5,000 Units SubCUTAneous Q8H    glucose chewable tablet 16 g  4 Tab Oral PRN    glucagon (GLUCAGEN) injection 1 mg  1 mg IntraMUSCular PRN    dextrose 10% infusion 0-250 mL  0-250 mL IntraVENous PRN    insulin lispro (HUMALOG) injection   SubCUTAneous AC&HS      Allergies   Allergen Reactions    Augmentin [Amoxicillin-Pot Clavulanate] Hives    Penicillins Other (comments)     States skin peeling with penicillin    Zoster Vaccine Live Hives       Objective:  Vitals:    Vitals:    08/19/20 1929 08/20/20 0315 08/20/20 0559 08/20/20 0848   BP: 168/69 148/73  171/68   Pulse: 94 88  92   Resp: 18 14     Temp: 98.2 °F (36.8 °C) 98.6 °F (37 °C)  98.7 °F (37.1 °C)   SpO2:  97%  99%   Weight:   82.4 kg (181 lb 10.5 oz)    Height:         Intake and Output:  No intake/output data recorded. 08/18 1901 - 08/20 0700  In: -   Out: 1950     Physical Examination:    General: NAD,Conversant   Neck:  Supple, no mass  Resp:  Lungs CTA B/L, no wheezing , normal respiratory effort  CV:  RRR,  no murmur or rub,trace LE edema  GI:  Soft, NT, + Bowel sounds, no hepatosplenomegaly  Neurologic:  Non focal  Psych:             AAO x 3 appropriate affect   Ext                  Left BKA. R heel in dressing   Access          LUE AVF + thrill     []    High complexity decision making was performed  []    Patient is at high-risk of decompensation with multiple organ involvement    Lab Data Personally Reviewed: I have reviewed all the pertinent labs, microbiology data and radiology studies during assessment.     Recent Labs     08/18/20  0521      K 3.6      CO2 25   *   BUN 35*   CREA 6.76*   CA 8.9     Recent Labs     08/19/20  0447 08/18/20  0521   WBC 12.3* 13.0*   HGB 9.6* 9.5*   HCT 30.3* 30.2*    312     No results found for: SDES  Lab Results   Component Value Date/Time    Culture result: NO GROWTH 5 DAYS 08/13/2020 12:32 AM    Culture result: NO GROWTH 6 DAYS 08/12/2020 03:55 PM    Culture result: SCANT  MIXED SKIN MAHESH ISOLATED   03/17/2017 03:08 PM    Culture result: NO ANAEROBES ISOLATED 03/17/2017 03:08 PM    Culture result: NO GROWTH 2 DAYS 06/24/2016 10:25 PM     Recent Results (from the past 24 hour(s))   SARS-COV-2    Collection Time: 08/19/20  9:05 PM   Result Value Ref Range    Specimen source Nasopharyngeal      SARS-CoV-2 PENDING     Specimen source Nasopharyngeal      Specimen type NP Swab      Health status Symptomatic Testing     GLUCOSE, POC    Collection Time: 08/19/20  9:39 PM   Result Value Ref Range    Glucose (POC) 89 65 - 100 mg/dL    Performed by [address], POC    Collection Time: 08/20/20  6:08 AM   Result Value Ref Range    Glucose (POC) 88 65 - 100 mg/dL    Performed by Pauly Felipe    GLUCOSE, POC    Collection Time: 08/20/20 11:14 AM   Result Value Ref Range    Glucose (POC) 116 (H) 65 - 100 mg/dL    Performed by Truong Davidson            Total time spent with patient:  xxx   min. Care Plan discussed with:  Patient     Family      RN      Consulting Physician [facility],         I have reviewed the flowsheets. Chart and Pertinent Notes have been reviewed. No change in PMH ,family and social history from Consult note.       Kayla Landis MD

## 2020-08-20 NOTE — PROGRESS NOTES
Dr. Lara Pro paged regarding patient's blood sugar of 116 at 1114. Inquiring about whether 1130 Humalog should be given. [address] [facility] Dr. Lara Pro advised to give no Humalog or Lantus based on blood sugar of 116 at 1114.

## 2020-08-20 NOTE — PROGRESS NOTES
[address] [facility]  Hospitalist Group                                                                                          Hospitalist Progress Note  Carol Reese MD  Answering service: [phone] OR [phone] from in house phone      NAME:  Hansa Moreno  :  1955  MRN:  [id number]      Admission Summary:   Hansa Moreno is a 75 y.o. male who presents with right foot ulcer. Patient presented to the ER with a right heel wound. Per patient and the patient's family member that the wound has been there for about 6 to 8 months, he has been getting wound care for the same, but the wound got worse and starting hurting. Today it was noticed that the wound was draining, patient was sent to the ER for further management and evaluation. Patient continues to have pain and discomfort in his right lower extremity. Patient recently had a BKA in July due to peripheral arterial disease. Interval history / Subjective:   Patient was without complaints on exam  Seen by vascular surgery with right BKA done 20   pain controlled on current pain med doses  COVID test ordered to return to facility     Assessment & Plan:     Sepsis due to Right foot cellulitis/gangrene with osteomyelitis :   broad-spectrum IV antibiotics= cont for 48hrs postop- then DC,    Heel was initially debrided - then Vascular surgery completed R BKA- 20  Cont plavix for PVD     ESRD:   hemodialysis MWF,    Nephrology consulted and following     anemia of chronic disease:   Monitor Hb=9.5    2 units were transfused with dialysis 20     Hypokalemia: repleted,  Monitor     Diabetes mellitus type 2: Poorly controlled on admission w hyperglycemia  Improved glucose control with current insulin, Accu-Cheks diet control and close monitoring.   adjusting insulin regimen    HLD- cont statin  BPH- cont flomax  Glaucoma- cont home eyedrops    Code status: FULL  DVT prophylaxis: Heparin    Care Plan discussed with: Patient/Family  Anticipated Disposition: SNF/LTC  Anticipated Discharge: tomorrow     Hospital Problems  Date Reviewed: 3/17/2017          Codes Class Noted POA    Sepsis ([facility] [address] ICD-10-CM: A41.9  ICD-9-CM: 038.9, 995.91  8/12/2020 Unknown                Review of Systems:   A comprehensive review of systems was negative except for that written in the HPI. Vital Signs:    Last 24hrs VS reviewed since prior progress note. Most recent are:  Visit Vitals  /69   Pulse 94   Temp 98.2 °F (36.8 °C)   Resp 18   Ht 6' 1\" (1.854 m)   Wt 85.2 kg (187 lb 13.3 oz)   SpO2 100%   BMI 24.78 kg/m²         Intake/Output Summary (Last 24 hours) at 8/20/2020 0010  Last data filed at 8/19/2020 1800  Gross per 24 hour   Intake    Output 1950 ml   Net -1950 ml        Physical Examination:             Constitutional:  No acute distress,awake and alert    ENT:  Oral mucosa dry   Resp:  CTA bilaterally. No wheezing/rhonchi/rales. No accessory muscle use   CV:  Regular rhythm, normal rate, no murmurs, gallops, rubs    GI:  Soft, non distended, non tender. normoactive bowel sounds,    Musculoskeletal:  left BKA, staples intact and healing well, R BKA in brace    Neurologic:  no focal weakness     Psych:  flat affect       Data Review:    Review and/or order of clinical lab test  Review and/or order of tests in the radiology section of CPT  Review and/or order of tests in the medicine section of CPT      Labs:     Recent Labs     08/19/20  0447 08/18/20  0521   WBC 12.3* 13.0*   HGB 9.6* 9.5*   HCT 30.3* 30.2*    312     Recent Labs     08/18/20  0521 08/17/20  0423    136   K 3.6 3.7    102   CO2 25 25   BUN 35* 53*   CREA 6.76* 9.20*   * 155*   CA 8.9 8.7   MG  --  1.9   PHOS  --  2.0*     No results for input(s): ALT, AP, TBIL, TBILI, TP, ALB, GLOB, GGT, AML, LPSE in the last 72 hours.     No lab exists for component: SGOT, GPT, AMYP, HLPSE  No results for input(s): INR, PTP, APTT, INREXT, INREXT in the last 72 hours. No results for input(s): FE, TIBC, PSAT, FERR in the last 72 hours. No results found for: FOL, RBCF   No results for input(s): PH, PCO2, PO2 in the last 72 hours. No results for input(s): CPK, CKNDX, TROIQ in the last 72 hours.     No lab exists for component: CPKMB  No results found for: CHOL, CHOLX, CHLST, CHOLV, HDL, HDLP, LDL, LDLC, DLDLP, TGLX, TRIGL, TRIGP, CHHD, CHHDX  Lab Results   Component Value Date/Time    Glucose (POC) 89 08/19/2020 09:39 PM    Glucose (POC) 205 (H) 08/19/2020 11:37 AM    Glucose (POC) 132 (H) 08/19/2020 06:11 AM    Glucose (POC) 164 (H) 08/18/2020 09:08 PM    Glucose (POC) 135 (H) 08/18/2020 04:42 PM     No results found for: COLOR, APPRN, SPGRU, REFSG, ALFONSO, PROTU, GLUCU, KETU, BILU, UROU, SUNSHINE, LEUKU, GLUKE, EPSU, BACTU, WBCU, RBCU, CASTS, UCRY      Medications Reviewed:     Current Facility-Administered Medications   Medication Dose Route Frequency    diphenhydrAMINE (BENADRYL) injection 25 mg  25 mg IntraVENous Q6H PRN    HYDROcodone-acetaminophen (NORCO)  mg tablet 1 Tab  1 Tab Oral Q4H PRN    morphine injection 2 mg  2 mg IntraVENous Q4H PRN    0.9% sodium chloride infusion 250 mL  250 mL IntraVENous PRN    HYDROcodone-acetaminophen (NORCO) 5-325 mg per tablet 1 Tab  1 Tab Oral Q4H PRN    insulin lispro (HUMALOG) injection 10 Units  10 Units SubCUTAneous TIDAC    insulin glargine (LANTUS) injection 20 Units  20 Units SubCUTAneous DAILY    timolol (TIMOPTIC) 0.25% ophthalmic solution  1 Drop Both Eyes BID    epoetin dyan-epbx (RETACRIT) injection 20,000 Units  20,000 Units SubCUTAneous Q MON, WED & FRI    clopidogreL (PLAVIX) tablet 75 mg  75 mg Oral DAILY    isosorbide mononitrate ER (IMDUR) tablet 60 mg  60 mg Oral DAILY    latanoprost (XALATAN) 0.005 % ophthalmic solution 1 Drop  1 Drop Both Eyes QHS    atorvastatin (LIPITOR) tablet 20 mg  20 mg Oral DAILY    tamsulosin (FLOMAX) capsule 0.4 mg  0.4 mg Oral QHS    sodium chloride (NS) flush 5-40 mL  5-40 mL IntraVENous PRN    acetaminophen (TYLENOL) tablet 650 mg  650 mg Oral Q4H PRN    heparin (porcine) injection 5,000 Units  5,000 Units SubCUTAneous Q8H    glucose chewable tablet 16 g  4 Tab Oral PRN    glucagon (GLUCAGEN) injection 1 mg  1 mg IntraMUSCular PRN    dextrose 10% infusion 0-250 mL  0-250 mL IntraVENous PRN    Vancomycin- Pharmacy to Dose    Other Rx Dosing/Monitoring    insulin lispro (HUMALOG) injection   SubCUTAneous AC&HS     ______________________________________________________________________  EXPECTED LENGTH OF STAY: 9d 19h  ACTUAL LENGTH OF STAY:          8                 Emily Aden MD

## 2020-08-20 NOTE — PROGRESS NOTES
[facility]  Hospitalist Group                                                                                          Hospitalist Progress Note  Maki Morton MD  Answering service: [phone] OR [phone] from in house phone      NAME:  Melissa Ortiz  :  1955  MRN:  [id number]      Admission Summary:     Melissa Ortiz is a 75 y.o. male who presents with right foot ulcer. Patient presented to the ER with a right heel wound. Per patient and the patient's family member that the wound has been there for about 6 to 8 months, he has been getting wound care for the same, but the wound got worse and starting hurting. Today it was noticed that the wound was draining, patient was sent to the ER for further management and evaluation. Patient continues to have pain and discomfort in his right lower extremity. Patient recently had a BKA in July due to peripheral arterial disease. Interval history / Subjective:     No acute complaint.      Assessment & Plan:     Sepsis  -Sepsis due to Right foot cellulitis/gangrene with osteomyelitis  -Heel was initially debrided - then Vascular surgery completed right BKA-   -Completed antibiotics    Peripheral vascular disease  -Continue Plavix     ESRD  -On hemodialysis MWF  -Nephrology following     Anemia  -Anemia of chronic kidney disease  -Status post transfusion of 2 units PRBCs with dialysis 20  -H&H stable     Hypokalemia  -Repleted     Diabetes mellitus   -Poorly controlled on admission with hyperglycemia  -Holding Lantus today due to poor p.o. intake    Dyslipidemia  -On statin    BPH  -On Flomax    Glaucoma  -Continue home eyedrops    Code status: FULL  DVT prophylaxis: Heparin    Care Plan discussed with: Patient/Family  Anticipated Disposition: SNF/LTC  Anticipated Discharge: Placement pending to do pending COVID result     Hospital Problems  Date Reviewed: 3/17/2017          Codes Class Noted POA    Sepsis ([facility] [address] ICD-10-CM: A41.9  ICD-9-CM: 038.9, 995.91  8/12/2020 Unknown                Review of Systems:   A comprehensive review of systems was negative except for that written in the HPI. Vital Signs:    Last 24hrs VS reviewed since prior progress note. Most recent are:  Visit Vitals  /60 (BP 1 Location: Right arm, BP Patient Position: At rest)   Pulse 82   Temp 98.2 °F (36.8 °C)   Resp 16   Ht 6' 1\" (1.854 m)   Wt 82.4 kg (181 lb 10.5 oz)   SpO2 98%   BMI 23.97 kg/m²         Intake/Output Summary (Last 24 hours) at 8/20/2020 1547  Last data filed at 8/19/2020 1800  Gross per 24 hour   Intake    Output 1950 ml   Net -1950 ml        Physical Examination:             Constitutional:  No acute distress,awake and alert    ENT:  Oral mucosa dry   Resp:  CTA bilaterally. No wheezing/rhonchi/rales. No accessory muscle use   CV:  Regular rhythm, normal rate, no murmurs, gallops, rubs    GI:  Soft, non distended, non tender. normoactive bowel sounds,    Musculoskeletal:  left BKA, staples intact and healing well, R BKA in brace    Neurologic:  no focal weakness     Psych:  flat affect       Data Review:    Review and/or order of clinical lab test  Review and/or order of tests in the radiology section of CPT  Review and/or order of tests in the medicine section of CPT      Labs:     Recent Labs     08/19/20  0447 08/18/20  0521   WBC 12.3* 13.0*   HGB 9.6* 9.5*   HCT 30.3* 30.2*    312     Recent Labs     08/18/20  0521      K 3.6      CO2 25   BUN 35*   CREA 6.76*   *   CA 8.9     No results for input(s): ALT, AP, TBIL, TBILI, TP, ALB, GLOB, GGT, AML, LPSE in the last 72 hours. No lab exists for component: SGOT, GPT, AMYP, HLPSE  No results for input(s): INR, PTP, APTT, INREXT, INREXT in the last 72 hours. No results for input(s): FE, TIBC, PSAT, FERR in the last 72 hours. No results found for: FOL, RBCF   No results for input(s): PH, PCO2, PO2 in the last 72 hours.   No results for input(s): CPK, CKNDX, TROIQ in the last 72 hours.     No lab exists for component: CPKMB  No results found for: CHOL, CHOLX, CHLST, CHOLV, HDL, HDLP, LDL, LDLC, DLDLP, TGLX, TRIGL, TRIGP, CHHD, CHHDX  Lab Results   Component Value Date/Time    Glucose (POC) 116 (H) 08/20/2020 11:14 AM    Glucose (POC) 88 08/20/2020 06:08 AM    Glucose (POC) 89 08/19/2020 09:39 PM    Glucose (POC) 205 (H) 08/19/2020 11:37 AM    Glucose (POC) 132 (H) 08/19/2020 06:11 AM     No results found for: COLOR, APPRN, SPGRU, REFSG, ALFONSO, PROTU, GLUCU, KETU, BILU, UROU, SUNSHINE, LEUKU, GLUKE, EPSU, BACTU, WBCU, RBCU, CASTS, UCRY      Medications Reviewed:     Current Facility-Administered Medications   Medication Dose Route Frequency    diphenhydrAMINE (BENADRYL) injection 25 mg  25 mg IntraVENous Q6H PRN    HYDROcodone-acetaminophen (NORCO)  mg tablet 1 Tab  1 Tab Oral Q4H PRN    morphine injection 2 mg  2 mg IntraVENous Q4H PRN    0.9% sodium chloride infusion 250 mL  250 mL IntraVENous PRN    HYDROcodone-acetaminophen (NORCO) 5-325 mg per tablet 1 Tab  1 Tab Oral Q4H PRN    insulin lispro (HUMALOG) injection 10 Units  10 Units SubCUTAneous TIDAC    insulin glargine (LANTUS) injection 20 Units  20 Units SubCUTAneous DAILY    timolol (TIMOPTIC) 0.25% ophthalmic solution  1 Drop Both Eyes BID    epoetin dyan-epbx (RETACRIT) injection 20,000 Units  20,000 Units SubCUTAneous Q MON, WED & FRI    clopidogreL (PLAVIX) tablet 75 mg  75 mg Oral DAILY    isosorbide mononitrate ER (IMDUR) tablet 60 mg  60 mg Oral DAILY    latanoprost (XALATAN) 0.005 % ophthalmic solution 1 Drop  1 Drop Both Eyes QHS    atorvastatin (LIPITOR) tablet 20 mg  20 mg Oral DAILY    tamsulosin (FLOMAX) capsule 0.4 mg  0.4 mg Oral QHS    sodium chloride (NS) flush 5-40 mL  5-40 mL IntraVENous PRN    acetaminophen (TYLENOL) tablet 650 mg  650 mg Oral Q4H PRN    heparin (porcine) injection 5,000 Units  5,000 Units SubCUTAneous Q8H    glucose chewable tablet 16 g  4 Tab Oral PRN    glucagon (GLUCAGEN) injection 1 mg  1 mg IntraMUSCular PRN    dextrose 10% infusion 0-250 mL  0-250 mL IntraVENous PRN    insulin lispro (HUMALOG) injection   SubCUTAneous AC&HS     ______________________________________________________________________  EXPECTED LENGTH OF STAY: 9d 19h  ACTUAL LENGTH OF STAY:          8                 Radha Cyr MD

## 2020-08-20 NOTE — PROGRESS NOTES
Bedside and Verbal shift change report given to Isha Becker RN (oncoming nurse) by Bernadine Osorio RN (offgoing nurse). Report included the following information SBAR, Procedure Summary and MAR.

## 2020-08-20 NOTE — PROGRESS NOTES
Bedside and Verbal shift change report given to [address] [place] [address] Saad, RN (oncoming nurse) by Morales Gudino RN (offgoing nurse). Report included the following information SBAR.

## 2020-08-21 LAB
GLUCOSE BLD STRIP.AUTO-MCNC: 131 MG/DL (ref 65–100)
GLUCOSE BLD STRIP.AUTO-MCNC: 180 MG/DL (ref 65–100)
GLUCOSE BLD STRIP.AUTO-MCNC: 232 MG/DL (ref 65–100)
GLUCOSE BLD STRIP.AUTO-MCNC: 86 MG/DL (ref 65–100)
HEALTH STATUS, XMCV2T: NORMAL
SARS-COV-2, COV2NT: NOT DETECTED
SERVICE CMNT-IMP: ABNORMAL
SERVICE CMNT-IMP: NORMAL
SOURCE, COVRS: NORMAL
SPECIMEN SOURCE, FCOV2M: NORMAL
SPECIMEN TYPE, XMCV1T: NORMAL

## 2020-08-21 PROCEDURE — 90935 HEMODIALYSIS ONE EVALUATION: CPT

## 2020-08-21 PROCEDURE — 74011000250 HC RX REV CODE- 250

## 2020-08-21 PROCEDURE — 74011636637 HC RX REV CODE- 636/637

## 2020-08-21 PROCEDURE — 65270000029 HC RM PRIVATE

## 2020-08-21 PROCEDURE — 74011250637 HC RX REV CODE- 250/637

## 2020-08-21 PROCEDURE — 94760 N-INVAS EAR/PLS OXIMETRY 1: CPT

## 2020-08-21 PROCEDURE — 82962 GLUCOSE BLOOD TEST: CPT

## 2020-08-21 PROCEDURE — 74011250636 HC RX REV CODE- 250/636

## 2020-08-21 RX ORDER — INSULIN GLARGINE 300 U/ML
10 INJECTION, SOLUTION SUBCUTANEOUS DAILY
Qty: 1 ADJUSTABLE DOSE PRE-FILLED PEN SYRINGE | Refills: 0 | Status: SHIPPED
Start: 2020-08-21

## 2020-08-21 RX ADMIN — HEPARIN SODIUM 5000 UNITS: 5000 INJECTION INTRAVENOUS; SUBCUTANEOUS at 22:50

## 2020-08-21 RX ADMIN — TIMOLOL MALEATE 1 DROP: 2.5 SOLUTION/ DROPS OPHTHALMIC at 19:28

## 2020-08-21 RX ADMIN — ATORVASTATIN CALCIUM 20 MG: 20 TABLET, FILM COATED ORAL at 09:12

## 2020-08-21 RX ADMIN — TAMSULOSIN HYDROCHLORIDE 0.4 MG: 0.4 CAPSULE ORAL at 22:50

## 2020-08-21 RX ADMIN — LATANOPROST 1 DROP: 50 SOLUTION OPHTHALMIC at 23:28

## 2020-08-21 RX ADMIN — EPOETIN ALFA-EPBX 20000 UNITS: 10000 INJECTION, SOLUTION INTRAVENOUS; SUBCUTANEOUS at 22:50

## 2020-08-21 RX ADMIN — HEPARIN SODIUM 5000 UNITS: 5000 INJECTION INTRAVENOUS; SUBCUTANEOUS at 12:16

## 2020-08-21 RX ADMIN — INSULIN LISPRO 2 UNITS: 100 INJECTION, SOLUTION INTRAVENOUS; SUBCUTANEOUS at 12:12

## 2020-08-21 RX ADMIN — INSULIN GLARGINE 20 UNITS: 100 INJECTION, SOLUTION SUBCUTANEOUS at 09:09

## 2020-08-21 RX ADMIN — ISOSORBIDE MONONITRATE 60 MG: 30 TABLET ORAL at 09:12

## 2020-08-21 RX ADMIN — INSULIN LISPRO 10 UNITS: 100 INJECTION, SOLUTION INTRAVENOUS; SUBCUTANEOUS at 09:10

## 2020-08-21 RX ADMIN — CLOPIDOGREL BISULFATE 75 MG: 75 TABLET ORAL at 09:12

## 2020-08-21 RX ADMIN — TIMOLOL MALEATE 1 DROP: 2.5 SOLUTION/ DROPS OPHTHALMIC at 09:15

## 2020-08-21 RX ADMIN — INSULIN LISPRO 10 UNITS: 100 INJECTION, SOLUTION INTRAVENOUS; SUBCUTANEOUS at 19:25

## 2020-08-21 RX ADMIN — INSULIN LISPRO 10 UNITS: 100 INJECTION, SOLUTION INTRAVENOUS; SUBCUTANEOUS at 12:12

## 2020-08-21 RX ADMIN — HEPARIN SODIUM 5000 UNITS: 5000 INJECTION INTRAVENOUS; SUBCUTANEOUS at 04:40

## 2020-08-21 NOTE — PROGRESS NOTES
Occupational Therapy    Completed chart review and attempted OT session. Patient off the floor for dialysis. Will continue to follow for OT services. Plans to dc to SNF.        Thank you,    Walter Valentin, OT

## 2020-08-21 NOTE — PROGRESS NOTES
JJ: Return to the [facility]. [facility] transport is set #[phone] is set for 7 PM.    Noted discharge orders. CM called the [address] of [address] and left message. CM spoke with RN. The patient needs dialysis prior to discharge and they cannot dialyze until 1 or 2 PM.     3:32 PM: CM called [facility] #[phone] and obtained auth for stretcher transport. CM arranged stretcher transport with [facility] for 7 PM. [id number]. The patient is set with dialysis transportation MW at 7:40 AM through [facility]. The transport for Monday will be through [address] (#[phone]). The facility is aware. IM letter has been delivered. CM received call from Juhi García with [facility]. They do not contract with [facility]. [facility] will be here to transport patient at 7 PM. Rn is aware. Andra Viveros at the [address] [place] [address] is aware of transport time. CM spoke with daughter Ora Bowles. She is aware of discharge and transport time, as well as dialysis transportation that has been arranged. Discharge folder located on hard chart to include ambulance form and discharge papers. RN to follow with Trevor, [facility] and call report to #[phone].     Alexi Barajas, BSW/CRM

## 2020-08-21 NOTE — PROCEDURES
[facility] Dialysis Team [facility]  [phone]    Vitals   Pre   Post   Assessment   Pre   Post     Temp  97.7  97.6 LOC  Lethargic, but appropriate  Oriented x 2 Lethargic  Oriented x 2   HR   78 86 Lungs   Clear, denies SOB  clear   B/P   127/57 113/48   Cardiac   HRR/ monitored remotely  HRR   Resp   18 18 Skin   WDI/ drsg on rt leg  WDI   Pain level  Pain Intensity 1: 0 (08/21/20 0832) 0/10 Edema    gen puffiness   None noted   Orders:    Duration:   Start:    1500 End:    1830 Total:   3.5 hrs   Dialyzer:   Dialyzer/Set Up Inspection: [employer]([id number]/ [id number]) (08/21/20 1455)   K Bath:   Dialysate K (mEq/L): 2 (08/21/20 1455)   Ca Bath:   Dialysate CA (mEq/L): 2.5 (08/21/20 1455)   Na/Bicarb:   Dialysate NA (mEq/L): 140 (08/21/20 1455)   Target Fluid Removal:   Goal/Amount of Fluid to Remove (mL): 2500 mL (08/21/20 1455)   Access     Type & Location:   HARSHA-AVF:  +T/B, no redness/ swelling. Access cleaned w/ alcohol and CHG. Cannulated x 2 w/ 15g x 1\" needles. +flashes/ aspir/ NS flushes. Secured well. Labs     Obtained/Reviewed   Critical Results Called   Date when labs were drawn-  Hgb-    HGB   Date Value Ref Range Status   08/19/2020 9.6 (L) 12.1 - 17.0 g/dL Final     K-    Potassium   Date Value Ref Range Status   08/18/2020 3.6 3.5 - 5.1 mmol/L Final     Ca-   Calcium   Date Value Ref Range Status   08/18/2020 8.9 8.5 - 10.1 MG/DL Final     Bun-   BUN   Date Value Ref Range Status   08/18/2020 35 (H) 6 - 20 MG/DL Final     Comment:     INVESTIGATED PER DELTA CHECK PROTOCOL     Creat-   Creatinine   Date Value Ref Range Status   08/18/2020 6.76 (H) 0.70 - 1.30 MG/DL Final     Comment:     INVESTIGATED PER DELTA CHECK PROTOCOL        Medications/ Blood Products Given     Name   Dose   Route and Time           No HD meds ordered or given          Blood Volume Processed (BVP):    79 Net Fluid   Removed:  2500   Comments   Time Out Done: yes, by LEANDER at [id number] [facility]  Primary Nurse Rpt Pre:  MICHAELLE Nikki, RN  Primary Nurse Rpt Post:  Anita Rizzo RN  Pt Education:  Discussed procedure and cannulation sites  Care Plan:  Continue to do HD txs as ordered. Tx Summary:  Tolerated tx well. At end, blood in circuit returned w/ 300ml NS. Cannulas removed x 2. Pressure held to each site till no bleeding noted--approx 5 mins each. +T/B noted  Admiting Diagnosis:  BKA  Pt's previous clinic-  Consent signed - Informed Consent Verified: No (08/21/20 1455)  Ananya Consent - obtained  Hepatitis Status- Hep B AB > 1000 1/15/20                             Hep B Ag neg 1/15/20  Machine #- Machine Number: Mick Otero (08/21/20 1455)  Telemetry status-monitored remotely  Pre-dialysis wt. - Pre-Dialysis Weight: 86.5 kg (190 lb 11.2 oz) (08/21/20 1455)

## 2020-08-21 NOTE — DISCHARGE INSTRUCTIONS

## 2020-08-21 NOTE — DISCHARGE SUMMARY
Discharge Summary       PATIENT ID: Jose Roth  MRN: [id number]   YOB: 1955    DATE OF ADMISSION: 8/12/2020  3:26 PM    DATE OF DISCHARGE: 8/21/2020   PRIMARY CARE PROVIDER: Chavez Pope MD     ATTENDING PHYSICIAN: Keiry Calle  DISCHARGING PROVIDER: Angélica Murrieta MD    To contact this individual call [phone] and ask the  to page. If unavailable ask to be transferred the Adult Hospitalist Department. CONSULTATIONS: IP CONSULT TO NEPHROLOGY    PROCEDURES/SURGERIES: Procedure(s):  RIGHT BELOW KNEE AMPUTATION    ADMITTING [address] [place] [address] COURSE:     HPI  Clarence Gomez a 75 y. o. male who presents with right foot ulcer. Patient presented to the ER with a right heel wound.  Per patient and the patient's family member that the wound has been there for about 6 to 8 months, he has been getting wound care for the same, but the wound got worse and starting hurting. Shekhar Radha it was noticed that the wound was draining, patient was sent to the ER for further management and evaluation. Bibiana Carreno continues to have pain and discomfort in his right lower extremity.  Patient recently had a BKA in July due to peripheral arterial disease. Hospital Course  1. Sepsis  Patient presents with sepsis due to right foot cellulitis/gangrene with osteomyelitis. Initially right heel was debrided and then subsequently underwent right below-knee amputation by vascular surgery. Patient has completed antibiotics during this admission. Patient to continue Plavix for peripheral vascular disease, to follow-up with vascular surgery in 3 weeks as outpatient. 2.  Diabetes  Patient's Lantus dose was adjusted to 10 units as patient with somewhat poor p.o. intake. Needs follow-up with PCP for reevaluation. 3.  End-stage renal disease  On hemodialysis Monday Wednesday Friday schedule. DISCHARGE DIAGNOSES / PLAN:      1. Sepsis  2. Cellulitis  3. Peripheral vascular disease  4.  End-stage renal disease  5. Anemia  6. Diabetes  7. Dyslipidemia  8. BPH  9. Glaucoma     ADDITIONAL CARE RECOMMENDATIONS:     None    PENDING TEST RESULTS:   At the time of discharge the following test results are still pending: None    FOLLOW UP APPOINTMENTS:    Follow-up Information     Follow up With Specialties Details Why Contact Info    Vivi Melgar MD [facility]   [address] [phone]               DIET: Diabetic Diet and Renal Diet  Oral Nutritional Supplements: No Oral Supplement prescribed    ACTIVITY: PT/OT Eval and Treat    WOUND CARE: As per instruction from vascular surgery    EQUIPMENT needed: None      DISCHARGE MEDICATIONS:  Current Discharge Medication List      CONTINUE these medications which have CHANGED    Details   insulin glargine U-300 conc (Toujeo SoloStar U-300 Insulin) 300 unit/mL (1.5 mL) inpn pen 10 Units by SubCUTAneous route daily. Qty: 1 Adjustable Dose Pre-filled Pen Syringe, Refills: 0         CONTINUE these medications which have NOT CHANGED    Details   calcium acetate (PHOSLO) 667 mg cap Take 3 Caps by mouth three (3) times daily (with meals). FOLIC ACID/VIT BCOMP,C (DIALYVITE PO) Take 1 Tab by mouth daily. latanoprost (XALATAN) 0.005 % ophthalmic solution Administer 1 Drop to both eyes nightly. levobunolol (BETAGAN) 0.5 % ophthalmic solution Administer 1 Drop to both eyes daily. multivitamin (ONE A DAY) tablet Take 1 Tab by mouth daily. clopidogreL (PLAVIX) 75 mg tab Take 75 mg by mouth. traZODone (DESYREL) 50 mg tablet Take 150 mg by mouth nightly. docusate sodium (COLACE) 100 mg capsule Take 100 mg by mouth two (2) times daily as needed. isosorbide mononitrate ER (IMDUR) 60 mg CR tablet Take 60 mg by mouth daily. Hold during dialysis sessions      tamsulosin (FLOMAX) 0.4 mg capsule Take 0.4 mg by mouth nightly. simvastatin (ZOCOR) 40 mg tablet Take 40 mg by mouth nightly. NOTIFY YOUR PHYSICIAN FOR ANY OF THE FOLLOWING:   Fever over 101 degrees for 24 hours. Chest pain, shortness of breath, fever, chills, nausea, vomiting, diarrhea, change in mentation, falling, weakness, bleeding. Severe pain or pain not relieved by medications. Or, any other signs or symptoms that you may have questions about. DISPOSITION:    Home With:   OT  PT  HH  RN       Long term SNF/Inpatient Rehab    Independent/assisted living    Hospice    Other:       PATIENT CONDITION AT DISCHARGE:     Functional status    Poor     Deconditioned     Independent      Cognition     Lucid     Forgetful     Dementia      Catheters/lines (plus indication)    Wilkinson     PICC     PEG     None      Code status     Full code     DNR      PHYSICAL EXAMINATION AT DISCHARGE:  General:          Alert, cooperative, no distress, appears stated age. HEENT:           Atraumatic, anicteric sclerae, pink conjunctivae                          No oral ulcers, mucosa moist, throat clear, dentition fair  Neck:               Supple, symmetrical  Lungs:             Clear to auscultation bilaterally. No Wheezing or Rhonchi. No rales. Chest wall:      No tenderness  No Accessory muscle use. Heart:              Regular  rhythm,  No  murmur   No edema  Abdomen:        Soft, non-tender. Not distended. Bowel sounds normal  Extremities:     No cyanosis. No clubbing,                            Skin turgor normal, Capillary refill normal  Skin:                Not pale. Not Jaundiced  No rashes   Psych:             Not anxious or agitated.   Neurologic:      Alert, moves all extremities, answers questions appropriately and responds to commands       CHRONIC MEDICAL DIAGNOSES:  Problem List as of 8/21/2020 Date Reviewed: 3/17/2017          Codes Class Noted - Resolved    Sepsis ([facility] ICD-10-CM: A41.9  ICD-9-CM: 038.9, 995.91  8/12/2020 - Present        Atherosclerosis of native arteries of the extremities with gangrene ([facility] ICD-10-CM: I70.269  ICD-9-CM: 440.24  7/21/2020 - Present        Abscess of left foot ICD-10-CM: N90.373  ICD-9-CM: 682.7  3/17/2017 - Present        Osteomyelitis, chronic, ankle or foot ([facility] ICD-10-CM: T06.146  ICD-9-CM: 730.17  6/8/2016 - Present        HTN (hypertension) (Chronic) ICD-10-CM: I10  ICD-9-CM: 401.9  6/8/2016 - Present        DM type 2 causing renal disease (HCC) (Chronic) ICD-10-CM: E11.29  ICD-9-CM: 250.40  6/8/2016 - Present        ESRD on dialysis [facility]) (Chronic) ICD-10-CM: N18.6, Z99.2  ICD-9-CM: 585.6, V45.11  6/8/2016 - Present        CAD (coronary artery disease) (Chronic) ICD-10-CM: I25.10  ICD-9-CM: 414.00  6/8/2016 - Present        History of CVA (cerebrovascular accident) (Chronic) ICD-10-CM: R66.82  ICD-9-CM: V12.54  6/8/2016 - Present        Hyperkalemia ICD-10-CM: E87.5  ICD-9-CM: 276.7  6/8/2016 - Present              Greater than 60 minutes were spent with the patient on counseling and coordination of care    Signed:   Baylee Downing MD  8/21/2020  9:13 AM

## 2020-08-21 NOTE — PROGRESS NOTES
Problem: Falls - Risk of  Goal: *Absence of Falls  Description: Document Calos Temple Fall Risk and appropriate interventions in the flowsheet. Outcome: Progressing Towards Goal  Note: Fall Risk Interventions:  Mobility Interventions: Communicate number of staff needed for ambulation/transfer    Mentation Interventions: Adequate sleep, hydration, pain control, Bed/chair exit alarm, Door open when patient unattended, More frequent rounding, Reorient patient    Medication Interventions: Evaluate medications/consider consulting pharmacy, Bed/chair exit alarm    Elimination Interventions: Call light in reach              Problem: Pressure Injury - Risk of  Goal: *Prevention of pressure injury  Description: Document Jose Scale and appropriate interventions in the flowsheet.   Outcome: Progressing Towards Goal  Note: Pressure Injury Interventions:  Sensory Interventions: Assess changes in LOC, Check visual cues for pain, Float heels, Keep linens dry and wrinkle-free, Minimize linen layers    Moisture Interventions: Absorbent underpads, Check for incontinence Q2 hours and as needed    Activity Interventions: Increase time out of bed, Pressure redistribution bed/mattress(bed type), PT/OT evaluation    Mobility Interventions: Float heels, HOB 30 degrees or less, Pressure redistribution bed/mattress (bed type), PT/OT evaluation    Nutrition Interventions: Document food/fluid/supplement intake    Friction and Shear Interventions: HOB 30 degrees or less, Lift sheet, Lift team/patient mobility team, Minimize layers                Problem: General Medical Care Plan  Goal: *Vital signs within specified parameters  Outcome: Progressing Towards Goal

## 2020-08-21 NOTE — PROGRESS NOTES
Patient off of the floor for dialysis, then discharging to The [address] [place] [address].   Carolina Primer

## 2020-08-21 NOTE — PROGRESS NOTES
Bedside shift change report given to Vira (oncoming nurse) by Manuela Pimentel (offgoing nurse). Report included the following information SBAR, Kardex, Intake/Output, MAR and Recent Results.

## 2020-08-21 NOTE — PROGRESS NOTES
[facility]   [address] [facility], [address] [place] [address] Rd [address], [facility]  Phone: [phone]   [phone]       Nephrology Progress Note  Gia Schaefer     1955     [id number]  Date of Admission : 8/12/2020 08/21/20    CC: Follow up for ESRD     Assessment and Plan   ESRD- HD   - dialyzes MWF @ [address] . F/B Dr Kate Villalba  - HD for today  -  Ciro Nissen for d/c after dialysis    PVD   - s/p Left BKA 7/20   - R BKA 8/17    Anemia in CKD:  - transfused 2 units on 8/17  - ARSALAN w/ HD    HTN   - continue home meds     [facility]     Type II DM - per Primary team      Interval History:  Seen and examined. Feeling ok. Resting in bed. No cp, sob, n/v/d. Dialysis for this afternoon. Review of Systems: A comprehensive review of systems was negative except for that written in the HPI.     Current Medications:   Current Facility-Administered Medications   Medication Dose Route Frequency    diphenhydrAMINE (BENADRYL) injection 25 mg  25 mg IntraVENous Q6H PRN    HYDROcodone-acetaminophen (NORCO)  mg tablet 1 Tab  1 Tab Oral Q4H PRN    morphine injection 2 mg  2 mg IntraVENous Q4H PRN    0.9% sodium chloride infusion 250 mL  250 mL IntraVENous PRN    HYDROcodone-acetaminophen (NORCO) 5-325 mg per tablet 1 Tab  1 Tab Oral Q4H PRN    insulin lispro (HUMALOG) injection 10 Units  10 Units SubCUTAneous TIDAC    insulin glargine (LANTUS) injection 20 Units  20 Units SubCUTAneous DAILY    timolol (TIMOPTIC) 0.25% ophthalmic solution  1 Drop Both Eyes BID    epoetin dyan-epbx (RETACRIT) injection 20,000 Units  20,000 Units SubCUTAneous Q MON, WED & FRI    clopidogreL (PLAVIX) tablet 75 mg  75 mg Oral DAILY    isosorbide mononitrate ER (IMDUR) tablet 60 mg  60 mg Oral DAILY    latanoprost (XALATAN) 0.005 % ophthalmic solution 1 Drop  1 Drop Both Eyes QHS    atorvastatin (LIPITOR) tablet 20 mg  20 mg Oral DAILY    tamsulosin (FLOMAX) capsule 0.4 mg  0.4 mg Oral QHS    sodium chloride (NS) flush 5-40 mL 5-40 mL IntraVENous PRN    acetaminophen (TYLENOL) tablet 650 mg  650 mg Oral Q4H PRN    heparin (porcine) injection 5,000 Units  5,000 Units SubCUTAneous Q8H    glucose chewable tablet 16 g  4 Tab Oral PRN    glucagon (GLUCAGEN) injection 1 mg  1 mg IntraMUSCular PRN    dextrose 10% infusion 0-250 mL  0-250 mL IntraVENous PRN    insulin lispro (HUMALOG) injection   SubCUTAneous AC&HS      Allergies   Allergen Reactions    Augmentin [Amoxicillin-Pot Clavulanate] Hives    Penicillins Other (comments)     States skin peeling with penicillin    Zoster Vaccine Live Hives       Objective:  Vitals:    Vitals:    08/20/20 2015 08/21/20 0240 08/21/20 0442 08/21/20 0832   BP: 125/67 159/71  141/52   Pulse: 81 96  85   Resp: 16 16  15   Temp: 98.4 °F (36.9 °C) 97.8 °F (36.6 °C)  98.6 °F (37 °C)   SpO2: 100% 100%  98%   Weight:   85 kg (187 lb 6.3 oz)    Height:         Intake and Output:  No intake/output data recorded. No intake/output data recorded. Physical Examination:    General: NAD,Conversant   Neck:  Supple, no mass  Resp:  Lungs CTA B/L, no wheezing , normal respiratory effort  CV:  RRR,  no murmur or rub,trace LE edema  GI:  Soft, NT, + Bowel sounds, no hepatosplenomegaly  Neurologic:  Non focal  Psych:             AAO x 3 appropriate affect   Ext                  Left BKA. R heel in dressing   Access          LUE AVF + thrill     []    High complexity decision making was performed  []    Patient is at high-risk of decompensation with multiple organ involvement    Lab Data Personally Reviewed: I have reviewed all the pertinent labs, microbiology data and radiology studies during assessment. No results for input(s): NA, K, CL, CO2, GLU, BUN, CREA, CA, MG, PHOS, ALB, TBIL, ALT, INR, INREXT, INREXT in the last 72 hours.     No lab exists for component: SGOT  Recent Labs     08/19/20  0447   WBC 12.3*   HGB 9.6*   HCT 30.3*        No results found for: SDES  Lab Results   Component Value Date/Time Culture result: NO GROWTH 5 DAYS 08/13/2020 12:32 AM    Culture result: NO GROWTH 6 DAYS 08/12/2020 03:55 PM    Culture result: SCANT  MIXED SKIN MAHESH ISOLATED   03/17/2017 03:08 PM    Culture result: NO ANAEROBES ISOLATED 03/17/2017 03:08 PM    Culture result: NO GROWTH 2 DAYS 06/24/2016 10:25 PM     Recent Results (from the past 24 hour(s))   GLUCOSE, POC    Collection Time: 08/20/20  4:23 PM   Result Value Ref Range    Glucose (POC) 154 (H) 65 - 100 mg/dL    Performed by [address], POC    Collection Time: 08/20/20  9:22 PM   Result Value Ref Range    Glucose (POC) 230 (H) 65 - 100 mg/dL    Performed by Clifford MYLES    GLUCOSE, POC    Collection Time: 08/21/20  6:30 AM   Result Value Ref Range    Glucose (POC) 180 (H) 65 - 100 mg/dL    Performed by Evan Mcdaniels, POC    Collection Time: 08/21/20 11:16 AM   Result Value Ref Range    Glucose (POC) 232 (H) 65 - 100 mg/dL    Performed by Karli Meyer            Total time spent with patient:  xxx   min. Care Plan discussed with:  Patient     Family      RN      Consulting Physician [facility],         I have reviewed the flowsheets. Chart and Pertinent Notes have been reviewed. No change in PMH ,family and social history from Consult note.       Luisito Barker MD

## 2020-08-22 VITALS
WEIGHT: 183.64 LBS | RESPIRATION RATE: 16 BRPM | HEART RATE: 85 BPM | BODY MASS INDEX: 24.34 KG/M2 | HEIGHT: 73 IN | TEMPERATURE: 98.8 F | OXYGEN SATURATION: 98 % | SYSTOLIC BLOOD PRESSURE: 150 MMHG | DIASTOLIC BLOOD PRESSURE: 58 MMHG

## 2020-08-22 LAB
GLUCOSE BLD STRIP.AUTO-MCNC: 133 MG/DL (ref 65–100)
SERVICE CMNT-IMP: ABNORMAL

## 2020-08-22 PROCEDURE — 74011636637 HC RX REV CODE- 636/637

## 2020-08-22 PROCEDURE — 74011250637 HC RX REV CODE- 250/637

## 2020-08-22 PROCEDURE — 82962 GLUCOSE BLOOD TEST: CPT

## 2020-08-22 PROCEDURE — 74011250636 HC RX REV CODE- 250/636

## 2020-08-22 RX ADMIN — INSULIN LISPRO 10 UNITS: 100 INJECTION, SOLUTION INTRAVENOUS; SUBCUTANEOUS at 09:06

## 2020-08-22 RX ADMIN — HEPARIN SODIUM 5000 UNITS: 5000 INJECTION INTRAVENOUS; SUBCUTANEOUS at 05:24

## 2020-08-22 RX ADMIN — TIMOLOL MALEATE 1 DROP: 2.5 SOLUTION/ DROPS OPHTHALMIC at 08:21

## 2020-08-22 RX ADMIN — ISOSORBIDE MONONITRATE 60 MG: 30 TABLET ORAL at 08:14

## 2020-08-22 RX ADMIN — INSULIN GLARGINE 20 UNITS: 100 INJECTION, SOLUTION SUBCUTANEOUS at 09:05

## 2020-08-22 RX ADMIN — ATORVASTATIN CALCIUM 20 MG: 20 TABLET, FILM COATED ORAL at 08:14

## 2020-08-22 RX ADMIN — CLOPIDOGREL BISULFATE 75 MG: 75 TABLET ORAL at 08:14

## 2020-08-22 NOTE — PROGRESS NOTES
RN received a call from [facility] with [facility] about transportation this morning to the [facility]. She stated transport would be here shortly to pick him up. RN called the [facility] to update of arrival time, but no answer. RN yesterday called report. [phone] - [facility] aware patient is being discharged now.

## 2020-08-22 NOTE — PROGRESS NOTES
Bedside and Verbal shift change report given to Bisi Gonzalez RN (oncoming nurse) by Christine Villa RN (offgoing nurse). Report included the following information SBAR.

## 2020-08-22 NOTE — PROGRESS NOTES
Bedside and Verbal shift change report given to Stevan Tejeda (oncoming nurse) by Eagle Hogan (offgoing nurse). Report included the following information SBAR and Kardex. 19:45- RN contacted Marilia Davis with [facility] about status of transport which was supposed to arrive at 7 PM. Marilia Davis said that she would be in contact with transport and call the nurse's station \"once she knew what was going on\". 19:50- Marilia Davis said that the transport had broken down in [facility]. The  claimed that he had been in contact with primary nurse, but RN had received no communication from the transport. Marilia Davis indicated that transport could still possibly come tonight, but she would call and find out for sure. 20:45- RN called Marilia Davis again to see if transport was coming. She indicated that it would not be coming and the earliest she could get another transport was by 09:30 in the morning. Marilia Davis gave RN a number ([phone]) to try and get in touch with transport distributor. RN called and spoke with Jesus Delatorre, who said he would reach out to the transport again and also confirmed that they would not be able to come tonight, and the earliest transport would be at 09:30 in the morning. Nightshift charge nurse aware and RN also called and told the [facility] that patient would not be coming until tomorrow.

## 2020-08-27 ENCOUNTER — HOSPITAL ENCOUNTER (EMERGENCY)
Age: 65
Discharge: NURSING, REHAB OR LONG-TERM CARE | End: 2020-08-27
Attending: EMERGENCY MEDICINE
Payer: MEDICARE

## 2020-08-27 VITALS
SYSTOLIC BLOOD PRESSURE: 107 MMHG | DIASTOLIC BLOOD PRESSURE: 53 MMHG | HEART RATE: 111 BPM | RESPIRATION RATE: 15 BRPM | OXYGEN SATURATION: 97 % | TEMPERATURE: 98.7 F

## 2020-08-27 DIAGNOSIS — I77.0 A-V FISTULA (HCC): Primary | ICD-10-CM

## 2020-08-27 LAB
ALBUMIN SERPL-MCNC: 2.6 G/DL (ref 3.5–5)
ALBUMIN/GLOB SERPL: 0.5 {RATIO} (ref 1.1–2.2)
ALP SERPL-CCNC: 64 U/L (ref 45–117)
ALT SERPL-CCNC: 21 U/L (ref 12–78)
ANION GAP SERPL CALC-SCNC: 6 MMOL/L (ref 5–15)
APTT PPP: 28.3 SEC (ref 22.1–32)
AST SERPL-CCNC: 14 U/L (ref 15–37)
BASOPHILS # BLD: 0 K/UL (ref 0–0.1)
BASOPHILS NFR BLD: 0 % (ref 0–1)
BILIRUB SERPL-MCNC: 0.4 MG/DL (ref 0.2–1)
BUN SERPL-MCNC: 27 MG/DL (ref 6–20)
BUN/CREAT SERPL: 4 (ref 12–20)
CALCIUM SERPL-MCNC: 9.4 MG/DL (ref 8.5–10.1)
CHLORIDE SERPL-SCNC: 96 MMOL/L (ref 97–108)
CO2 SERPL-SCNC: 33 MMOL/L (ref 21–32)
CREAT SERPL-MCNC: 6.02 MG/DL (ref 0.7–1.3)
DIFFERENTIAL METHOD BLD: ABNORMAL
EOSINOPHIL # BLD: 0.1 K/UL (ref 0–0.4)
EOSINOPHIL NFR BLD: 2 % (ref 0–7)
ERYTHROCYTE [DISTWIDTH] IN BLOOD BY AUTOMATED COUNT: 17.8 % (ref 11.5–14.5)
GLOBULIN SER CALC-MCNC: 5 G/DL (ref 2–4)
GLUCOSE SERPL-MCNC: 264 MG/DL (ref 65–100)
HCT VFR BLD AUTO: 36.5 % (ref 36.6–50.3)
HGB BLD-MCNC: 11 G/DL (ref 12.1–17)
IMM GRANULOCYTES # BLD AUTO: 0 K/UL (ref 0–0.04)
IMM GRANULOCYTES NFR BLD AUTO: 0 % (ref 0–0.5)
INR PPP: 1.2 (ref 0.9–1.1)
LYMPHOCYTES # BLD: 1.1 K/UL (ref 0.8–3.5)
LYMPHOCYTES NFR BLD: 14 % (ref 12–49)
MCH RBC QN AUTO: 30.7 PG (ref 26–34)
MCHC RBC AUTO-ENTMCNC: 30.1 G/DL (ref 30–36.5)
MCV RBC AUTO: 102 FL (ref 80–99)
MONOCYTES # BLD: 0.4 K/UL (ref 0–1)
MONOCYTES NFR BLD: 6 % (ref 5–13)
NEUTS SEG # BLD: 5.7 K/UL (ref 1.8–8)
NEUTS SEG NFR BLD: 77 % (ref 32–75)
NRBC # BLD: 0 K/UL (ref 0–0.01)
NRBC BLD-RTO: 0 PER 100 WBC
PLATELET # BLD AUTO: 498 K/UL (ref 150–400)
PMV BLD AUTO: 9.1 FL (ref 8.9–12.9)
POTASSIUM SERPL-SCNC: 4 MMOL/L (ref 3.5–5.1)
PROT SERPL-MCNC: 7.6 G/DL (ref 6.4–8.2)
PROTHROMBIN TIME: 12.4 SEC (ref 9–11.1)
RBC # BLD AUTO: 3.58 M/UL (ref 4.1–5.7)
SODIUM SERPL-SCNC: 135 MMOL/L (ref 136–145)
THERAPEUTIC RANGE,PTTT: NORMAL SECS (ref 58–77)
WBC # BLD AUTO: 7.3 K/UL (ref 4.1–11.1)

## 2020-08-27 PROCEDURE — 74011000258 HC RX REV CODE- 258: Performed by: EMERGENCY MEDICINE

## 2020-08-27 PROCEDURE — 36415 COLL VENOUS BLD VENIPUNCTURE: CPT

## 2020-08-27 PROCEDURE — 74011250636 HC RX REV CODE- 250/636: Performed by: EMERGENCY MEDICINE

## 2020-08-27 PROCEDURE — 80053 COMPREHEN METABOLIC PANEL: CPT

## 2020-08-27 PROCEDURE — 85730 THROMBOPLASTIN TIME PARTIAL: CPT

## 2020-08-27 PROCEDURE — 85025 COMPLETE CBC W/AUTO DIFF WBC: CPT

## 2020-08-27 PROCEDURE — 99284 EMERGENCY DEPT VISIT MOD MDM: CPT

## 2020-08-27 PROCEDURE — 85610 PROTHROMBIN TIME: CPT

## 2020-08-27 PROCEDURE — 96374 THER/PROPH/DIAG INJ IV PUSH: CPT

## 2020-08-27 RX ADMIN — DESMOPRESSIN ACETATE 20 MCG: 4 SOLUTION INTRAVENOUS at 11:19

## 2020-08-27 NOTE — PROGRESS NOTES
Care Management - Received call from nursing to assist with transport home for patient. Sent referral via [employer] to [facility]. ETA was 17:05. Requested sooner time. Same time available. Rescheduled with [facility]. ETA is 20 minutes. Canceled [facility]. Completed PCS. Will place on clipboard.     HERIBERTO Villarreal

## 2020-08-27 NOTE — ED NOTES
Report called to Rowan Worthington at [facility] is here to transport home. Patient verbalized understanding of discharge instructions.

## 2020-08-27 NOTE — ED PROVIDER NOTES
HPI .  Patient has a history of coronary disease, myocardial infarction 2014, diabetes, hyperlipidemia, hypertension, peripheral artery disease, CVA, glaucoma, and bilateral BKA. Patient has a fistula dialysis access left bicep area. Patient was dialyzed yesterday. When he took the dressing off today there was a lot of bleeding. EMS describes a large pool of blood. They applied a pressure dressing and bleeding is controlled. Patient denies dizziness, diaphoresis, and near syncope. He denies loss of consciousness. He has no complaints other than the bleeding episode.     Past Medical History:   Diagnosis Date    CAD (coronary artery disease)      2014, MI, CABG    Chronic kidney disease     on dialysis MWF    Diabetes ([facility]     ESRD (end stage renal disease) ([facility]     Glaucoma     Heart failure ([facility]     chronic diastolic HF per cardiology note    Hyperlipidemia     Hypertension     Ill-defined condition     overweight BMI 27.2    PAD (peripheral artery disease) ([facility]     Stroke [facility]) August 2011    residual left side weakness stroke x 2 per cardiology note       Past Surgical History:   Procedure Laterality Date    CARDIAC SURG PROCEDURE UNLIST      cavbg 2014 x3 VESSELS    HX CHOLECYSTECTOMY  02/2020    HX CORONARY ARTERY BYPASS GRAFT  February 2014    HX HEART CATHETERIZATION  01/24/2014    HX OTHER SURGICAL Left 06/2016    last toe on left foot amputated, for nonhealing toe ulcer    HX VASCULAR ACCESS Right Brenden Catheter    removed when fistula healed    VASCULAR SURGERY PROCEDURE UNLIST      fistula left arm         Family History:   Problem Relation Age of Onset    Cancer Mother         \"bone\"   [facility] Stroke Father         aneurysm    Hypertension Sister     Glaucoma Sister     Heart Disease Brother         STENT    Other Brother         ENLARGED PROSTATE    HIV/AIDS Brother         FROM A BLOOD TRANSFUSION    Heart Attack Brother     Anesth Problems Neg Hx        Social History Socioeconomic History    Marital status: SINGLE     Spouse name: Not on file    Number of children: Not on file    Years of education: Not on file    Highest education level: Not on file   Occupational History    Not on file   Social Needs    Financial resource strain: Not on file    Food insecurity     Worry: Not on file     Inability: Not on file    Transportation needs     Medical: Not on file     Non-medical: Not on file   Tobacco Use    Smoking status: Never Smoker    Smokeless tobacco: Never Used   Substance and Sexual Activity    Alcohol use: No    Drug use: No    Sexual activity: Not on file   Lifestyle    Physical activity     Days per week: Not on file     Minutes per session: Not on file    Stress: Not on file   Relationships    Social connections     Talks on phone: Not on file     Gets together: Not on file     Attends Cheondoism service: Not on file     Active member of club or organization: Not on file     Attends meetings of clubs or organizations: Not on file     Relationship status: Not on file    Intimate partner violence     Fear of current or ex partner: Not on file     Emotionally abused: Not on file     Physically abused: Not on file     Forced sexual activity: Not on file   Other Topics Concern    Not on file   Social History Narrative    Not on file         ALLERGIES: Augmentin [amoxicillin-pot clavulanate]; Penicillins; and Zoster vaccine live    Review of Systems   All other systems reviewed and are negative. There were no vitals filed for this visit. Physical Exam  Vitals signs and nursing note reviewed. Constitutional:       Appearance: He is well-developed. HENT:      Head: Normocephalic and atraumatic. Eyes:      Pupils: Pupils are equal, round, and reactive to light. Neck:      Musculoskeletal: Normal range of motion and neck supple. Cardiovascular:      Rate and Rhythm: Normal rate and regular rhythm. Heart sounds: Murmur present.  No friction rub. No gallop. Comments: 2/6 systolic murmur; pressure dressing left bicep area no active bleeding;  Pulmonary:      Effort: Pulmonary effort is normal. No respiratory distress. Breath sounds: No wheezing or rales. Abdominal:      Palpations: Abdomen is soft. Tenderness: There is no abdominal tenderness. There is no rebound. Musculoskeletal: Normal range of motion. General: No tenderness. Comments: Left BKA; knee immobilizer right lower extremity; probable distal right BKA;   Skin:     Findings: No erythema. Neurological:      Mental Status: He is alert. Cranial Nerves: No cranial nerve deficit. Comments: Motor; symmetric   Psychiatric:         Behavior: Behavior normal.          MDM       Procedures          Note: Pressure dressing was bothering the patient's arm and hand. It was removed a while ago. DDAVP has been infused. Patient is not bleeding. He wants to go home and will be discharged.   Mary Beth Esparza MD  12:19 PM

## 2020-08-27 NOTE — PROGRESS NOTES
Date of previous inpatient admission/ ED visit? Last IP admission was 08/12/2020-08/22/2020 for sepsis. Pt has had 2 IP admissions in the past 6 months. Pt RRAT score is 28- HIGH. 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 What brought the patient back to ED? Vascular Access Problem    Did patient decline recommended services during last admission/ ED visit (if yes, what)? No    Has patient seen a provider since their last inpatient admission/ED visit (if yes, when)? No    PCP: First and Last name: Montse Butt MD   Name of Practice:   Are you a current patient: Yes/No:    Approximate date of last visit:    CM Interventions:  From previous inpatient admission/ED visit: CM arranged EMS transport and return to [address] [place] [address]. CM arranged Medicaid transport for HD. From current inpatient admission/ED visit: Pt being evaluated in the ED at this time. Disposition needs TBD/subject to change pending care recommendations. CM will continue to follow. Above assessment completed from EMR.      Suki You RN, BSN  Care Management Department

## 2020-08-27 NOTE — ED TRIAGE NOTES
Pt arrives with EMS from the [address] [facility] facility. Pt was taking off bandage to dialysis shunt at approx 0930. On bandage removal blood was spurting from site. Facility had bleed controlled on EMS arrival. EMS placed pressure dressing that is still present.  Pt denies cp/sob

## 2023-05-17 NOTE — ANESTHESIA PREPROCEDURE EVALUATION
Relevant Problems   No relevant active problems       Anesthetic History   No history of anesthetic complications            Review of Systems / Medical History  Patient summary reviewed, nursing notes reviewed and pertinent labs reviewed    Pulmonary  Within defined limits                 Neuro/Psych       CVA       Cardiovascular    Hypertension      CHF    Past MI, CAD, PAD and CABG         GI/Hepatic/Renal         Renal disease: ESRD and dialysis       Endo/Other    Diabetes         Other Findings              Physical Exam    Airway  Mallampati: II  TM Distance: > 6 cm  Neck ROM: normal range of motion   Mouth opening: Normal     Cardiovascular  Regular rate and rhythm,  S1 and S2 normal,  no murmur, click, rub, or gallop             Dental  No notable dental hx       Pulmonary  Breath sounds clear to auscultation               Abdominal  GI exam deferred       Other Findings            Anesthetic Plan    ASA: 3  Anesthesia type: general          Induction: Intravenous  Anesthetic plan and risks discussed with: Patient 22

## (undated) DEVICE — DRSG BURN GZ FN MSH 4X25IN --

## (undated) DEVICE — INFECTION CONTROL KIT SYS

## (undated) DEVICE — SPLINT CAST W5XL30IN GRN STRENGTH PLSTR OF PARIS FAST SET

## (undated) DEVICE — STOCKINETTE,IMPERVIOUS,12X48,STERILE: Brand: MEDLINE

## (undated) DEVICE — SPONGE GZ W4XL4IN COT 12 PLY TYP VII WVN C FLD DSGN

## (undated) DEVICE — SOLUTION IV 1000ML 0.9% SOD CHL

## (undated) DEVICE — DRAPE,REIN 53X77,STERILE: Brand: MEDLINE

## (undated) DEVICE — KIT INFECTION CTRL ST FRAN --

## (undated) DEVICE — BANDAGE COBAN 4 IN COMPR W4INXL5YD FOAM COHESIVE QUIK STK SELF ADH SFT

## (undated) DEVICE — PREP SKN CHLRAPRP APL 26ML STR --

## (undated) DEVICE — SURGICAL PROCEDURE PACK BASIN MAJ SET CUST NO CAUT

## (undated) DEVICE — TOWEL SURG W17XL27IN STD BLU COT NONFENESTRATED PREWASHED

## (undated) DEVICE — DRAPE,EXTREMITY,89X128,STERILE: Brand: MEDLINE

## (undated) DEVICE — REM POLYHESIVE ADULT PATIENT RETURN ELECTRODE: Brand: VALLEYLAB

## (undated) DEVICE — DBD-PACK,LAPAROTOMY,2 REINFORCED GOWNS: Brand: MEDLINE

## (undated) DEVICE — STRAP,POSITIONING,KNEE/BODY,FOAM,4X60": Brand: MEDLINE

## (undated) DEVICE — STERILE POLYISOPRENE POWDER-FREE SURGICAL GLOVES WITH EMOLLIENT COATING: Brand: PROTEXIS

## (undated) DEVICE — SYR IRR BLB 2OZ DISP BLU STRL -- CONVERT TO ITEM 357637

## (undated) DEVICE — HANDLE LT SNAP ON ULT DURABLE LENS FOR TRUMPF ALC DISPOSABLE

## (undated) DEVICE — ROCKER SWITCH PENCIL BLADE ELECTRODE, HOLSTER: Brand: EDGE

## (undated) DEVICE — COVER LT HNDL BLU PLAS

## (undated) DEVICE — BANDAGE COMPR 9 FTX4 IN SMOOTH COMFORTABLE SYNTH ESMRK LF

## (undated) DEVICE — BNDG ELAS HK LOOP 4X5YD NS -- MATRIX

## (undated) DEVICE — INTENDED FOR TISSUE SEPARATION, AND OTHER PROCEDURES THAT REQUIRE A SHARP SURGICAL BLADE TO PUNCTURE OR CUT.: Brand: BARD-PARKER ® CARBON RIB-BACK BLADES

## (undated) DEVICE — SUTURE VCRL SZ 0 L54IN ABSRB VLT LIGAPAK REEL NDL J207G

## (undated) DEVICE — Device

## (undated) DEVICE — DRESSING PETRO W3XL8IN N ADH OIL EMUL GZ CURAD

## (undated) DEVICE — IMMOBILIZER PREMIER PRO KNEE CUTAWAY CNTOUR 22INCH COOL BLU

## (undated) DEVICE — SUT ETHLN 3-0 18IN PS1 BLK --

## (undated) DEVICE — SUTURE VCRL SZ 2-0 L36IN ABSRB UD L40MM CT 1/2 CIR J957H

## (undated) DEVICE — ADAPTER MON OD15X22MM ID15MM STD LUER FIT W/ LIFESAVER

## (undated) DEVICE — BANDAGE,GAUZE,BULKEE II,4.5"X4.1YD,STRL: Brand: MEDLINE

## (undated) DEVICE — WIRE SAW SURG FOR BONE CUT GIGLI 510 MM LEN UNIV

## (undated) DEVICE — TAPE ADH CLTH SILK H2O REPELLENT CURAD

## (undated) DEVICE — SOLUTION IRRIG 1000ML H2O STRL BLT

## (undated) DEVICE — SUPER SPONGES,MEDIUM: Brand: DERMACEA

## (undated) DEVICE — SPONGE LAP 18X18IN STRL -- 5/PK

## (undated) DEVICE — HOOK LOCK LATEX FREE ELASTIC BANDAGE 4INX5YD

## (undated) DEVICE — DEVON™ KNEE AND BODY STRAP 60" X 3" (1.5 M X 7.6 CM): Brand: DEVON

## (undated) DEVICE — PAD,ABDOMINAL,5"X9",ST,LF,25/BX: Brand: MEDLINE INDUSTRIES, INC.

## (undated) DEVICE — DRAIN WND PENRS RADPQ 0.25X18 -- CONVERT TO ITEM 360112

## (undated) DEVICE — PLUS HANDPIECE WITH SUCTION TUBING AND TRAUMA TIP WITH SMALL SOFT CONE: Brand: SURGILAV

## (undated) DEVICE — (D)PREP SKN CHLRAPRP APPL 26ML -- CONVERT TO ITEM 371833

## (undated) DEVICE — (D)SYR 10ML 1/5ML GRAD NSAF -- PKGING CHANGE USE ITEM 338027

## (undated) DEVICE — BASIC PACK: Brand: CONVERTORS

## (undated) DEVICE — STERILE POLYISOPRENE POWDER-FREE SURGICAL GLOVES: Brand: PROTEXIS

## (undated) DEVICE — SOL IRRIGATION INJ NACL 0.9% 500ML BTL

## (undated) DEVICE — SOLUTION IRRIG 3000ML 0.9% SOD CHL FLX CONT 0797208] ICU MEDICAL INC]

## (undated) DEVICE — TRAY PREP DRY W/ PREM GLV 2 APPL 6 SPNG 2 UNDPD 1 OVERWRAP